# Patient Record
Sex: FEMALE | Race: WHITE | NOT HISPANIC OR LATINO | Employment: OTHER | URBAN - METROPOLITAN AREA
[De-identification: names, ages, dates, MRNs, and addresses within clinical notes are randomized per-mention and may not be internally consistent; named-entity substitution may affect disease eponyms.]

---

## 2017-01-02 ENCOUNTER — APPOINTMENT (OUTPATIENT)
Dept: GENERAL RADIOLOGY | Facility: HOSPITAL | Age: 82
End: 2017-01-02

## 2017-01-02 ENCOUNTER — APPOINTMENT (OUTPATIENT)
Dept: CARDIOLOGY | Facility: HOSPITAL | Age: 82
End: 2017-01-02
Attending: EMERGENCY MEDICINE

## 2017-01-02 ENCOUNTER — HOSPITAL ENCOUNTER (OUTPATIENT)
Facility: HOSPITAL | Age: 82
Setting detail: OBSERVATION
LOS: 1 days | Discharge: HOME | End: 2017-01-03
Attending: EMERGENCY MEDICINE | Admitting: HOSPITALIST

## 2017-01-02 DIAGNOSIS — J44.1 COPD EXACERBATION (HCC): ICD-10-CM

## 2017-01-02 DIAGNOSIS — M25.562 ACUTE PAIN OF LEFT KNEE: ICD-10-CM

## 2017-01-02 DIAGNOSIS — M25.462 KNEE EFFUSION, LEFT: Primary | ICD-10-CM

## 2017-01-02 LAB
ALBUMIN SERPL-MCNC: 3 G/DL (ref 3.5–5.2)
ALBUMIN/GLOB SERPL: 0.8 G/DL
ALP SERPL-CCNC: 68 U/L (ref 39–117)
ALT SERPL W P-5'-P-CCNC: 27 U/L (ref 1–33)
ANION GAP SERPL CALCULATED.3IONS-SCNC: 14 MMOL/L
APPEARANCE FLD: ABNORMAL
APTT PPP: 32.8 SECONDS (ref 22.7–35.4)
AST SERPL-CCNC: 22 U/L (ref 1–32)
BASOPHILS # BLD AUTO: 0.03 10*3/MM3 (ref 0–0.2)
BASOPHILS NFR BLD AUTO: 0.2 % (ref 0–1.5)
BILIRUB SERPL-MCNC: 0.7 MG/DL (ref 0.1–1.2)
BUN BLD-MCNC: 12 MG/DL (ref 8–23)
BUN/CREAT SERPL: 28.6 (ref 7–25)
CALCIUM SPEC-SCNC: 9.2 MG/DL (ref 8.6–10.5)
CHLORIDE SERPL-SCNC: 94 MMOL/L (ref 98–107)
CO2 SERPL-SCNC: 27 MMOL/L (ref 22–29)
COLOR FLD: YELLOW
CREAT BLD-MCNC: 0.42 MG/DL (ref 0.57–1)
CRP SERPL-MCNC: 24.96 MG/DL (ref 0–0.5)
CRYSTALS FLD MICRO: NORMAL
D DIMER PPP FEU-MCNC: 1.18 MCGFEU/ML (ref 0–0.49)
D-LACTATE SERPL-SCNC: 1 MMOL/L (ref 0.5–2)
DEPRECATED RDW RBC AUTO: 55.4 FL (ref 37–54)
EOSINOPHIL # BLD AUTO: 0 10*3/MM3 (ref 0–0.7)
EOSINOPHIL NFR BLD AUTO: 0 % (ref 0.3–6.2)
ERYTHROCYTE [DISTWIDTH] IN BLOOD BY AUTOMATED COUNT: 17.5 % (ref 11.7–13)
FLUAV AG NPH QL: NEGATIVE
FLUBV AG NPH QL IA: NEGATIVE
GFR SERPL CREATININE-BSD FRML MDRD: 145 ML/MIN/1.73
GLOBULIN UR ELPH-MCNC: 3.9 GM/DL
GLUCOSE BLD-MCNC: 153 MG/DL (ref 65–99)
HCT VFR BLD AUTO: 37.4 % (ref 35.6–45.5)
HGB BLD-MCNC: 12.1 G/DL (ref 11.9–15.5)
HOLD SPECIMEN: NORMAL
HOLD SPECIMEN: NORMAL
IMM GRANULOCYTES # BLD: 0.29 10*3/MM3 (ref 0–0.03)
IMM GRANULOCYTES NFR BLD: 2 % (ref 0–0.5)
INR PPP: 1.04 (ref 0.9–1.1)
LYMPHOCYTES # BLD AUTO: 1.14 10*3/MM3 (ref 0.9–4.8)
LYMPHOCYTES NFR BLD AUTO: 7.8 % (ref 19.6–45.3)
MCH RBC QN AUTO: 28.2 PG (ref 26.9–32)
MCHC RBC AUTO-ENTMCNC: 32.4 G/DL (ref 32.4–36.3)
MCV RBC AUTO: 87.2 FL (ref 80.5–98.2)
METHOD: ABNORMAL
MONOCYTES # BLD AUTO: 1.5 10*3/MM3 (ref 0.2–1.2)
MONOCYTES NFR BLD AUTO: 10.3 % (ref 5–12)
MONOCYTES NFR FLD: 7 %
NEUTROPHILS # BLD AUTO: 11.63 10*3/MM3 (ref 1.9–8.1)
NEUTROPHILS NFR BLD AUTO: 79.7 % (ref 42.7–76)
NEUTROPHILS NFR FLD MANUAL: 93 %
NT-PROBNP SERPL-MCNC: 493.3 PG/ML (ref 0–1800)
NUC CELL # FLD: ABNORMAL /MM3
PLATELET # BLD AUTO: 302 10*3/MM3 (ref 140–500)
PMV BLD AUTO: 10.3 FL (ref 6–12)
POTASSIUM BLD-SCNC: 4.1 MMOL/L (ref 3.5–5.2)
PROCALCITONIN SERPL-MCNC: 0.17 NG/ML (ref 0.1–0.25)
PROT SERPL-MCNC: 6.9 G/DL (ref 6–8.5)
PROTHROMBIN TIME: 13.2 SECONDS (ref 11.7–14.2)
RBC # BLD AUTO: 4.29 10*6/MM3 (ref 3.9–5.2)
RBC # FLD AUTO: 220 /MM3
SODIUM BLD-SCNC: 135 MMOL/L (ref 136–145)
TROPONIN T SERPL-MCNC: <0.01 NG/ML (ref 0–0.03)
WBC NRBC COR # BLD: 14.59 10*3/MM3 (ref 4.5–10.7)
WHOLE BLOOD HOLD SPECIMEN: NORMAL
WHOLE BLOOD HOLD SPECIMEN: NORMAL

## 2017-01-02 PROCEDURE — 93971 EXTREMITY STUDY: CPT

## 2017-01-02 PROCEDURE — 93010 ELECTROCARDIOGRAM REPORT: CPT | Performed by: INTERNAL MEDICINE

## 2017-01-02 PROCEDURE — 87205 SMEAR GRAM STAIN: CPT | Performed by: EMERGENCY MEDICINE

## 2017-01-02 PROCEDURE — 94640 AIRWAY INHALATION TREATMENT: CPT

## 2017-01-02 PROCEDURE — 83880 ASSAY OF NATRIURETIC PEPTIDE: CPT | Performed by: EMERGENCY MEDICINE

## 2017-01-02 PROCEDURE — 87070 CULTURE OTHR SPECIMN AEROBIC: CPT | Performed by: EMERGENCY MEDICINE

## 2017-01-02 PROCEDURE — 89060 EXAM SYNOVIAL FLUID CRYSTALS: CPT | Performed by: EMERGENCY MEDICINE

## 2017-01-02 PROCEDURE — 85379 FIBRIN DEGRADATION QUANT: CPT | Performed by: EMERGENCY MEDICINE

## 2017-01-02 PROCEDURE — 80053 COMPREHEN METABOLIC PANEL: CPT | Performed by: EMERGENCY MEDICINE

## 2017-01-02 PROCEDURE — 89051 BODY FLUID CELL COUNT: CPT | Performed by: EMERGENCY MEDICINE

## 2017-01-02 PROCEDURE — 99285 EMERGENCY DEPT VISIT HI MDM: CPT

## 2017-01-02 PROCEDURE — 85730 THROMBOPLASTIN TIME PARTIAL: CPT | Performed by: NURSE PRACTITIONER

## 2017-01-02 PROCEDURE — 85610 PROTHROMBIN TIME: CPT | Performed by: EMERGENCY MEDICINE

## 2017-01-02 PROCEDURE — 84145 PROCALCITONIN (PCT): CPT | Performed by: EMERGENCY MEDICINE

## 2017-01-02 PROCEDURE — 85025 COMPLETE CBC W/AUTO DIFF WBC: CPT | Performed by: EMERGENCY MEDICINE

## 2017-01-02 PROCEDURE — 87040 BLOOD CULTURE FOR BACTERIA: CPT | Performed by: EMERGENCY MEDICINE

## 2017-01-02 PROCEDURE — 86140 C-REACTIVE PROTEIN: CPT | Performed by: EMERGENCY MEDICINE

## 2017-01-02 PROCEDURE — 93005 ELECTROCARDIOGRAM TRACING: CPT | Performed by: NURSE PRACTITIONER

## 2017-01-02 PROCEDURE — 83605 ASSAY OF LACTIC ACID: CPT | Performed by: EMERGENCY MEDICINE

## 2017-01-02 PROCEDURE — 87015 SPECIMEN INFECT AGNT CONCNTJ: CPT | Performed by: EMERGENCY MEDICINE

## 2017-01-02 PROCEDURE — 87804 INFLUENZA ASSAY W/OPTIC: CPT | Performed by: EMERGENCY MEDICINE

## 2017-01-02 PROCEDURE — 73560 X-RAY EXAM OF KNEE 1 OR 2: CPT

## 2017-01-02 PROCEDURE — 96374 THER/PROPH/DIAG INJ IV PUSH: CPT

## 2017-01-02 PROCEDURE — 84484 ASSAY OF TROPONIN QUANT: CPT | Performed by: EMERGENCY MEDICINE

## 2017-01-02 PROCEDURE — 71020 HC CHEST PA AND LATERAL: CPT

## 2017-01-02 RX ORDER — ALBUTEROL SULFATE 2.5 MG/3ML
2.5 SOLUTION RESPIRATORY (INHALATION) ONCE
Status: COMPLETED | OUTPATIENT
Start: 2017-01-02 | End: 2017-01-02

## 2017-01-02 RX ORDER — SODIUM CHLORIDE 0.9 % (FLUSH) 0.9 %
10 SYRINGE (ML) INJECTION AS NEEDED
Status: DISCONTINUED | OUTPATIENT
Start: 2017-01-02 | End: 2017-01-03 | Stop reason: HOSPADM

## 2017-01-02 RX ORDER — IPRATROPIUM BROMIDE AND ALBUTEROL SULFATE 2.5; .5 MG/3ML; MG/3ML
3 SOLUTION RESPIRATORY (INHALATION)
Status: DISCONTINUED | OUTPATIENT
Start: 2017-01-02 | End: 2017-01-03 | Stop reason: HOSPADM

## 2017-01-02 RX ORDER — GLUCOSAMINE SULF/CHONDROITIN A 500-250 MG
3 CAPSULE ORAL 2 TIMES DAILY
COMMUNITY
End: 2022-01-01 | Stop reason: ALTCHOICE

## 2017-01-02 RX ORDER — METHYLPREDNISOLONE SODIUM SUCCINATE 125 MG/2ML
125 INJECTION, POWDER, LYOPHILIZED, FOR SOLUTION INTRAMUSCULAR; INTRAVENOUS ONCE
Status: DISCONTINUED | OUTPATIENT
Start: 2017-01-02 | End: 2017-01-02

## 2017-01-02 RX ORDER — LIDOCAINE HYDROCHLORIDE AND EPINEPHRINE 10; 10 MG/ML; UG/ML
20 INJECTION, SOLUTION INFILTRATION; PERINEURAL ONCE
Status: DISCONTINUED | OUTPATIENT
Start: 2017-01-02 | End: 2017-01-03 | Stop reason: HOSPADM

## 2017-01-02 RX ADMIN — ALBUTEROL SULFATE 2.5 MG: 2.5 SOLUTION RESPIRATORY (INHALATION) at 17:31

## 2017-01-02 NOTE — IP AVS SNAPSHOT
AFTER VISIT SUMMARY             Gely Carlisle           About your hospitalization     You were admitted on:  January 2, 2017 You last received care in the:  [address]       Procedures & Surgeries         Medications    If you or your caregiver advised us that you are currently taking a medication and that medication is marked below as “Resume”, this simply indicates that we have reviewed those medications to make sure our new therapy recommendations do not interfere.  If you have concerns about medications other than those new ones which we are prescribing today, please consult the physician who prescribed them (or your primary physician).  Our review of your home medications is not meant to indicate that we are directing their use.             Your Medications      START taking these medications     indomethacin 50 MG capsule   1 tablet by mouth 3 times a day 3 days then decrease to twice a day for a total of 7 days   Commonly known as:  INDOCIN             CHANGE how you take these medications     methylPREDNISolone 4 MG tablet   Double the usual 4 mg of methylprednisolone to 8 mg for 3 days and then return to her usual dose   Commonly known as:  MEDROL   What changed:    - how much to take  - how to take this  - when to take this  - additional instructions             CONTINUE taking these medications     albuterol (2.5 MG/3ML) 0.083% nebulizer solution   Take 2.5 mg by nebulization every 4 (four) hours as needed for wheezing.   Last time this was given:  1/2/2017  5:31 PM   Commonly known as:  PROVENTIL           calcium carbonate 600 MG tablet   Take 600 mg by mouth 2 (two) times a day with meals.   Commonly known as:  OS-RADHA           cetirizine 10 MG tablet   Take 10 mg by mouth Daily.   Commonly known as:  zyrTEC           Cranberry 1000 MG capsule   Take  by mouth.           fluticasone-salmeterol 100-50 MCG/DOSE DISKUS   Inhale 1 puff 2 (two) times a day. 250/50   Commonly  known as:  ADVAIR           GLUCOSAMINE CHONDROITIN COMPLX 500-250 MG capsule   Take 3 capsules by mouth 2 (Two) Times a Day.   Generic drug:  Glucosamine-Chondroitin           guaiFENesin 600 MG 12 hr tablet   Take 1,200 mg by mouth 2 (two) times a day.   Commonly known as:  MUCINEX           HYDROcodone-acetaminophen 5-325 MG per tablet   TK 1 T PO  Q 6 H PRN P   Commonly known as:  NORCO           levothyroxine 75 MCG tablet   Take 75 mcg by mouth daily.   Commonly known as:  SYNTHROID, LEVOTHROID           montelukast 10 MG tablet   Take 10 mg by mouth every night.   Commonly known as:  SINGULAIR           MULTIVITAMIN ADULT PO   Take  by mouth Daily.           risedronate 35 MG tablet   Take 35 mg by mouth Every 7 (Seven) Days. with water on empty stomach, nothing by mouth or lie down for next 30 minutes. Takes on Monday.   Commonly known as:  ACTONEL           rosuvastatin 10 MG tablet   Take 20 mg by mouth Daily.   Commonly known as:  CRESTOR           saccharomyces boulardii 250 MG capsule   Take 1 capsule by mouth 2 (two) times a day.   Commonly known as:  FLORASTOR           sodium chloride 7 % nebulizer solution nebulizer solution   USE 1 VIAL PER NEBULIZER TWICE A DAY           tiotropium 18 MCG per inhalation capsule   Place 1 capsule into inhaler and inhale 1 (one) time daily. Patient takes the handi mist   Commonly known as:  SPIRIVA           vitamin b complex capsule capsule   Take 1 capsule by mouth Daily.           vitamin C 250 MG tablet   Take 500 mg by mouth daily.   Commonly known as:  ASCORBIC ACID           VSL#3 DS PO   Take 1 capsule by mouth Daily with lunch and dinner.             STOP taking these medications     doxycycline 100 MG capsule   Commonly known as:  MONODOX           Unable to find                Where to Get Your Medications      These medications were sent to [facility] [address] [place] [address] [address] [facility] ALEXANDRA MENDIOLA AT [facility] [phone]  PH - [phone] FX  [address] [place] , [facility] [address]    Hours:  24-hours Phone:  [phone]     indomethacin 50 MG capsule         Information about where to get these medications is not yet available     ! Ask your nurse or doctor about these medications     methylPREDNISolone 4 MG tablet                  Your Medications      Your Medication List           Morning Noon Evening Bedtime As Needed    albuterol (2.5 MG/3ML) 0.083% nebulizer solution   Take 2.5 mg by nebulization every 4 (four) hours as needed for wheezing.   Commonly known as:  PROVENTIL                                   calcium carbonate 600 MG tablet   Take 600 mg by mouth 2 (two) times a day with meals.   Commonly known as:  OS-RADHA                                      cetirizine 10 MG tablet   Take 10 mg by mouth Daily.   Commonly known as:  zyrTEC                                   Cranberry 1000 MG capsule   Take  by mouth.                                   fluticasone-salmeterol 100-50 MCG/DOSE DISKUS   Inhale 1 puff 2 (two) times a day. 250/50   Commonly known as:  ADVAIR                                      GLUCOSAMINE CHONDROITIN COMPLX 500-250 MG capsule   Take 3 capsules by mouth 2 (Two) Times a Day.   Generic drug:  Glucosamine-Chondroitin                                      guaiFENesin 600 MG 12 hr tablet   Take 1,200 mg by mouth 2 (two) times a day.   Commonly known as:  MUCINEX                                      HYDROcodone-acetaminophen 5-325 MG per tablet   TK 1 T PO  Q 6 H PRN P   Commonly known as:  NORCO                                   indomethacin 50 MG capsule   1 tablet by mouth 3 times a day 3 days then decrease to twice a day for a total of 7 days   Commonly known as:  INDOCIN                                levothyroxine 75 MCG tablet   Take 75 mcg by mouth daily.   Commonly known as:  SYNTHROID, LEVOTHROID                                   methylPREDNISolone 4 MG tablet   Double the usual 4 mg of  methylprednisolone to 8 mg for 3 days and then return to her usual dose   Commonly known as:  MEDROL                                montelukast 10 MG tablet   Take 10 mg by mouth every night.   Commonly known as:  SINGULAIR                                   MULTIVITAMIN ADULT PO   Take  by mouth Daily.                                   risedronate 35 MG tablet   Take 35 mg by mouth Every 7 (Seven) Days. with water on empty stomach, nothing by mouth or lie down for next 30 minutes. Takes on Monday.   Commonly known as:  ACTONEL                                rosuvastatin 10 MG tablet   Take 20 mg by mouth Daily.   Commonly known as:  CRESTOR                                   saccharomyces boulardii 250 MG capsule   Take 1 capsule by mouth 2 (two) times a day.   Commonly known as:  FLORASTOR                                      sodium chloride 7 % nebulizer solution nebulizer solution   USE 1 VIAL PER NEBULIZER TWICE A DAY                                      tiotropium 18 MCG per inhalation capsule   Place 1 capsule into inhaler and inhale 1 (one) time daily. Patient takes the handi mist   Commonly known as:  SPIRIVA                                   vitamin b complex capsule capsule   Take 1 capsule by mouth Daily.                                   vitamin C 250 MG tablet   Take 500 mg by mouth daily.   Commonly known as:  ASCORBIC ACID                                   VSL#3 DS PO   Take 1 capsule by mouth Daily with lunch and dinner.                                               Instructions for After Discharge        Activity Instructions     You may resume activity as you feel able.            Diet Instructions     You may resume a regular diet as previously tolerated            Discharge References/Attachments     KNEE PAIN, EASY-TO-READ (ENGLISH)    CHRONIC OBSTRUCTIVE PULMONARY DISEASE, EASY-TO-READ (ENGLISH)    CRYOTHERAPY, EASY-TO-READ (ENGLISH)       Follow-ups for After Discharge        Follow-up  Information     Follow up with Basilio Burleson Jr, MD. Schedule an appointment as soon as possible for a visit in 4 week(s).    Specialty:  Orthopedic Surgery    Contact information:    [address]  [facility] [id number] [phone]          Follow up with Arnaud Paredes MD .    Specialty:  Family Medicine    Contact information:    [address]  [facility] [address]  [phone]        MyChart Signup     Our records indicate that you have declined [employer] MyChart signup. If you would like to sign up for [facility], please email [email or web address] or call [phone] to obtain an activation code.         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, Osteoporosis, Chronic Airway Obstruction, Chronic Adrenal Insufficiency, Elevated White Blood Cell Count      Care Providers     Provider Service Role Specialty    Maryellen Fofana MD Internal Medicine Attending Provider Internal Medicine    Basilio Burleson Jr., MD -- Consulting Physician  Orthopedic Surgery    Maryellen Fofana MD Internal Medicine Consulting Physician  Internal Medicine      Your Allergies  Date Reviewed: 1/3/2017    Allergen Reactions    Demerol (Meperidine) Shortness Of Breath         Aminophylline Arrhythmia         Avelox (Moxifloxacin Hcl In Nacl) Not Noted         Benadryl Allergy (Diphenhydramine Hcl (Sleep)) Not Noted         Biaxin (Clarithromycin) Not Noted         Codeine Not Noted         Levaquin (Levofloxacin In D5w) Arrhythmia         Macrobid (Nitrofurantoin Monohyd Macro) Not Noted         Morphine And Related Not Noted         Penicillins Not Noted         Sulfa Antibiotics Hives         Latex Rash      Pending Labs     Order Current Status    Respiratory Culture Collected (01/03/17 0140)    Blood Culture Preliminary  result    Blood Culture Preliminary result    Body Fluid Culture Preliminary result      Patient Belongings Returned     Document Return of Belongings Flowsheet     Were the patient bedside belongings sent home?   Yes   Belongings Retrieved from Security & Sent Home   N/A    Belongings Sent to Safe   --   Medications Retrieved from Pharmacy & Sent Home   N/A              More Information      Knee Pain  Knee pain is a common problem. It can have many causes. The pain often goes away by following your doctor's home care instructions. Treatment for ongoing pain will depend on the cause of your pain. If your knee pain continues, more tests may be needed to diagnose your condition. Tests may include X-rays or other imaging studies of your knee.  HOME CARE  · Take medicines only as told by your doctor.  · Rest your knee and keep it raised (elevated) while you are resting.  · Do not do things that cause pain or make your pain worse.  · Avoid activities where both feet leave the ground at the same time, such as running, jumping rope, or doing jumping jacks.  · Apply ice to the knee area:    Put ice in a plastic bag.    Place a towel between your skin and the bag.    Leave the ice on for 20 minutes, 2-3 times a day.  · Ask your doctor if you should wear an elastic knee support.  · Sleep with a pillow under your knee.  · Lose weight if you are overweight. Being overweight can make your knee hurt more.  · Do not use any tobacco products, including cigarettes, chewing tobacco, or electronic cigarettes. If you need help quitting, ask your doctor. Smoking may slow the healing of any bone and joint problems that you may have.  GET HELP IF:  · Your knee pain does not stop, it changes, or it gets worse.  · You have a fever along with knee pain.  · Your knee gives out or locks up.  · Your knee becomes more swollen.  GET HELP RIGHT AWAY IF:   · Your knee feels hot to the touch.  · You have chest pain or trouble breathing.     This  information is not intended to replace advice given to you by your health care provider. Make sure you discuss any questions you have with your health care provider.     Document Released: 03/16/2010 Document Revised: 01/08/2016 Document Reviewed: 02/18/2015  [employer] Interactive Patient Education ©2016 [employer]          Chronic Obstructive Pulmonary Disease  Chronic obstructive pulmonary disease (COPD) is a common lung problem. In COPD, the flow of air from the lungs is limited. The way your lungs work will probably never return to normal, but there are things you can do to improve your lungs and make yourself feel better. Your doctor may treat your condition with:  · Medicines.  · Oxygen.  · Lung surgery.  · Changes to your diet.  · Rehabilitation. This may involve a team of specialists.  HOME CARE  · Take all medicines as told by your doctor.  · Avoid medicines or cough syrups that dry up your airway (such as antihistamines) and do not allow you to get rid of thick spit. You do not need to avoid them if told differently by your doctor.  · If you smoke, stop. Smoking makes the problem worse.  · Avoid being around things that make your breathing worse (like smoke, chemicals, and fumes).  · Use oxygen therapy and therapy to help improve your lungs (pulmonary rehabilitation) if told by your doctor. If you need home oxygen therapy, ask your doctor if you should buy a tool to measure your oxygen level (oximeter).  · Avoid people who have a sickness you can catch (contagious).  · Avoid going outside when it is very hot, cold, or humid.  · Eat healthy foods. Eat smaller meals more often. Rest before meals.  · Stay active, but remember to also rest.  · Make sure to get all the shots (vaccines) your doctor recommends. Ask your doctor if you need a pneumonia shot.  · Learn and use tips on how to relax.  · Learn and use tips on how to control your breathing as told by your doctor. Try:    Breathing in (inhaling) through  your nose for 1 second. Then, pucker your lips and breath out (exhale) through your lips for 2 seconds.    Putting one hand on your belly (abdomen). Breathe in slowly through your nose for 1 second. Your hand on your belly should move out. Pucker your lips and breathe out slowly through your lips. Your hand on your belly should move in as you breathe out.  · Learn and use controlled coughing to clear thick spit from your lungs. The steps are:  . Lean your head a little forward.  . Breathe in deeply.  . Try to hold your breath for 3 seconds.  . Keep your mouth slightly open while coughing 2 times.  . Spit any thick spit out into a tissue.  . Rest and do the steps again 1 or 2 times as needed.  GET HELP IF:  · You cough up more thick spit than usual.  · There is a change in the color or thickness of the spit.  · It is harder to breathe than usual.  · Your breathing is faster than usual.  GET HELP RIGHT AWAY IF:  · You have shortness of breath while resting.  · You have shortness of breath that stops you from:    Being able to talk.    Doing normal activities.  · You chest hurts for longer than 5 minutes.  · Your skin color is more blue than usual.  · Your pulse oximeter shows that you have low oxygen for longer than 5 minutes.  MAKE SURE YOU:  · Understand these instructions.  · Will watch your condition.  · Will get help right away if you are not doing well or get worse.     This information is not intended to replace advice given to you by your health care provider. Make sure you discuss any questions you have with your health care provider.     Document Released: 06/05/2009 Document Revised: 01/08/2016 Document Reviewed: 08/14/2014  [employer] Interactive Patient Education ©2016 [employer]          Cryotherapy  Cryotherapy is when you put ice on your injury. Ice helps lessen pain and puffiness (swelling) after an injury. Ice works the best when you start using it in the first 24 to 48 hours after an injury.  HOME  CARE  · Put a dry or damp towel between the ice pack and your skin.  · You may press gently on the ice pack.  · Leave the ice on for no more than 10 to 20 minutes at a time.  · Check your skin after 5 minutes to make sure your skin is okay.  · Rest at least 20 minutes between ice pack uses.  · Stop using ice when your skin loses feeling (numbness).  · Do not use ice on someone who cannot tell you when it hurts. This includes small children and people with memory problems (dementia).  GET HELP RIGHT AWAY IF:  · You have white spots on your skin.  · Your skin turns blue or pale.  · Your skin feels waxy or hard.  · Your puffiness gets worse.  MAKE SURE YOU:   · Understand these instructions.  · Will watch your condition.  · Will get help right away if you are not doing well or get worse.     This information is not intended to replace advice given to you by your health care provider. Make sure you discuss any questions you have with your health care provider.     Document Released: 06/05/2009 Document Revised: 03/11/2013 Document Reviewed: 08/09/2012  [employer] Interactive Patient Education ©2016 [employer]            SYMPTOMS OF A STROKE    Call 911 or have someone take you to the Emergency Department if you have any of the following:    · Sudden numbness or weakness of your face, arm or leg especially on one side of the body  · Sudden confusion, diffiiculty speaking or trouble understanding   · Changes in your vision or loss of sight in one eye  · Sudden severe headache with no known cause  · sudden dizziness, trouble walking, loss of balance or coordination    It is important to seek emergency care right away if you have further stroke symptoms. If you get emergency help quickly, the powerful clot-dissolving medicines can reduce the disabilities caused by a stroke.     For more information:    American Stroke Association  4-342-4-STROKE  www.strokeassociation.org           IF YOU SMOKE OR USE TOBACCO PLEASE READ  THE FOLLOWING:    Why is smoking bad for me?  Smoking increases the risk of heart disease, lung disease, vascular disease, stroke, and cancer.     If you smoke, STOP!    If you would like more information on quitting smoking, please visit the [employer] website: [email or web address]-[email or web address]   This link will provide additional resources including the QUIT line and the Beat the Pack support groups.     For more information:    American Cancer Society  ([phone] 855-8243    American Heart Association  1-191.461.5912               YOU ARE THE MOST IMPORTANT FACTOR IN YOUR RECOVERY.     Follow all instructions carefully.     I have reviewed my discharge instructions with my nurse, including the following information, if applicable:     Information about my illness and diagnosis   Follow up appointments (including lab draws)   Wound Care   Equipment Needs   Medications (new and continuing) along with side effects   Preventative information such as vaccines and smoking cessations   Diet   Pain   I know when to contact my Doctor's office or seek emergency care      I want my nurse to describe the side effects of my medications: YES NO   If the answer is no, I understand the side effects of my medications: YES NO   My nurse described the side effects of my medications in a way that I could understand: YES NO   I have taken my personal belongings and my own medications with me at discharge: YES NO            I have received this information and my questions have been answered. I have discussed any concerns I see with this plan with the nurse or physician. I understand these instructions.    Signature of Patient or Responsible Person: _____________________________________    Date: _________________  Time: __________________    Signature of Healthcare Provider: _______________________________________  Date: _________________  Time: __________________

## 2017-01-02 NOTE — ED NOTES
Left knee pain, irregular heart beat, and shortness of breath. Left knee pain started two days ago after feeling pop upon standing. Other symptoms started today.      Estelita Clarke RN  01/02/17 1714

## 2017-01-02 NOTE — ED PROVIDER NOTES
" EMERGENCY DEPARTMENT ENCOUNTER    CHIEF COMPLAINT  Chief Complaint: SOB, left knee pain  History given by: patient, family  History limited by: nothing  Room Number: P879/1  PMD: Arnaud Paredes MD  Pulmonologist- Dr. Saud Latif and Dr. Hudson  Cardiologist- Dr. Sim Winter- Dr. Duran    HPI:  Pt is a 81 y.o. female who presents complaining of SOB, which began earlier today. Pt complains of left knee pain, which began when she woke up 3 days ago. Pt states that she felt a \"pop\" in her left knee about one week ago but did not have pain until 3 days ago. Pt denies cough, N/V/D, urinary issues or CP but complains of a fever of 100.6 earlier today. Pt's family states that the pt's SOB is worse with exertion and the pt's left leg has been swollen for the last 3 days.     Duration:  One day  Onset: gradual  Timing: constant  Location: N/A  Radiation: N/A  Quality: SOB  Intensity/Severity: moderate  Progression: worsening  Associated Symptoms: left knee pain, fever, LE swelling  Aggravating Factors: exertion  Alleviating Factors: none  Previous Episodes: Pt has a history of SOB due to COPD and asthma.  Treatment before arrival: none    PAST MEDICAL HISTORY  Active Ambulatory Problems     Diagnosis Date Noted   • MI (mitral incompetence) 04/13/2016   • Billowing mitral valve 04/13/2016   • Breath shortness 04/13/2016   • Cellulitis of right lower extremity with possible infected hematoma 07/30/2016   • Enterococcus UTI 07/30/2016   • Hypokalemia 07/30/2016   • Type 2 diabetes mellitus, uncontrolled 07/30/2016   • Bronchiectasis - on chronic steroid therapy 07/30/2016   • Chronic adrenal insufficiency 07/30/2016   • COPD (chronic obstructive pulmonary disease) 07/30/2016   • Lymphedema 07/30/2016   • Osteoporosis 07/30/2016   • Recurrent UTI 08/10/2016     Resolved Ambulatory Problems     Diagnosis Date Noted   • No Resolved Ambulatory Problems     Past Medical History   Diagnosis Date   • Asthma    • Cancer    • Colitis "    • Diabetes mellitus    • Disease of thyroid gland    • History of transfusion    • Hyperlipidemia    • Insufficiency, adrenal    • Leukocytosis    • MR (mitral regurgitation)    • MVP (mitral valve prolapse)    • Osteoarthritis    • Scoliosis    • Urinary tract infection        PAST SURGICAL HISTORY  Past Surgical History   Procedure Laterality Date   • Colon surgery  1983     RUPTURED DIVERTICULI  DYLAN WORLEY MD   • Cataract extraction     • Cholecystectomy       WITH COMMON BILE DUCT EXPLORATION REMOVAL OF STONE   • Hernia repair  2012     ABDOMINAL LEFT UPPER   • Hysterectomy     • Joint replacement Right      hip  2002 DR BROWN   • Lung lobectomy Left 1955     BRONCHIECTESIS LOWER LOBE   • Tonsillectomy       with adennoidectomy       FAMILY HISTORY  Family History   Problem Relation Age of Onset   • Heart disease Mother    • Heart attack Mother    • Cancer Sister      BREAST       SOCIAL HISTORY  Social History     Social History   • Marital status:      Spouse name: N/A   • Number of children: N/A   • Years of education: N/A     Occupational History   • Not on file.     Social History Main Topics   • Smoking status: Never Smoker   • Smokeless tobacco: Not on file   • Alcohol use No   • Drug use: No   • Sexual activity: No     Other Topics Concern   • Not on file     Social History Narrative       ALLERGIES  Demerol [meperidine]; Aminophylline; Avelox [moxifloxacin hcl in nacl]; Benadryl allergy [diphenhydramine hcl (sleep)]; Biaxin [clarithromycin]; Codeine; Levaquin [levofloxacin in d5w]; Macrobid [nitrofurantoin monohyd macro]; Morphine and related; Penicillins; Sulfa antibiotics; and Latex    REVIEW OF SYSTEMS  Review of Systems   Constitutional: Positive for fever (100.6).   HENT: Negative for sore throat.    Eyes: Negative.    Respiratory: Positive for shortness of breath. Negative for cough.    Cardiovascular: Positive for leg swelling (left leg). Negative for chest pain.   Gastrointestinal:  Negative for abdominal pain, diarrhea and vomiting.   Genitourinary: Negative for dysuria.   Musculoskeletal: Positive for arthralgias (L knee) and gait problem (due to left knee pain). Negative for neck pain.   Skin: Negative for rash.   Allergic/Immunologic: Negative.    Neurological: Negative for weakness, numbness and headaches.   Hematological: Negative.    Psychiatric/Behavioral: Negative.    All other systems reviewed and are negative.      PHYSICAL EXAM  ED Triage Vitals   Temp Heart Rate Resp BP SpO2   01/02/17 1656 01/02/17 1656 01/02/17 1656 01/02/17 1656 01/02/17 1656   98.7 °F (37.1 °C) 101 16 139/72 94 %      Temp src Heart Rate Source Patient Position BP Location FiO2 (%)   -- -- -- -- --              Physical Exam   Constitutional: She is oriented to person, place, and time and well-developed, well-nourished, and in no distress. No distress.   HENT:   Head: Normocephalic and atraumatic.   Eyes: EOM are normal. Pupils are equal, round, and reactive to light.   Neck: Normal range of motion. Neck supple. No JVD present.   Cardiovascular: Normal rate, regular rhythm and normal heart sounds.    Pulmonary/Chest: Effort normal. No respiratory distress. She has decreased breath sounds (bilaterally).   Abdominal: Soft. There is no tenderness. There is no rebound and no guarding.   Musculoskeletal: Normal range of motion.        Left knee: She exhibits swelling, effusion and erythema. Tenderness found.        Left ankle: She exhibits swelling.        Left lower leg: She exhibits swelling and edema.        Left foot: There is swelling.   Right hand is contracted   Neurological: She is alert and oriented to person, place, and time. She has normal sensation and normal strength.   Skin: Skin is warm and dry. No rash noted.   Psychiatric: Mood and affect normal.   Nursing note and vitals reviewed.      LAB RESULTS  Lab Results (last 24 hours)     Procedure Component Value Units Date/Time    aPTT [[id number]]   (Normal) Collected:  01/02/17 1759    Specimen:  Blood Updated:  01/02/17 1830     PTT 32.8 seconds     Protime-INR [[id number]]  (Normal) Collected:  01/02/17 1759    Specimen:  Blood Updated:  01/02/17 1830     Protime 13.2 Seconds      INR 1.04     D-dimer, Quantitative [[id number]]  (Abnormal) Collected:  01/02/17 1759    Specimen:  Blood Updated:  01/02/17 1830     D-Dimer, Quantitative 1.18 (H) MCGFEU/mL     Blood Culture [[id number]] Collected:  01/02/17 1759    Specimen:  Blood from Arm, Left Updated:  01/02/17 1811    CBC & Differential [[id number]] Collected:  01/02/17 1800    Specimen:  Blood Updated:  01/02/17 1818    Narrative:       The following orders were created for panel order CBC & Differential.  Procedure                               Abnormality         Status                     ---------                               -----------         ------                     CBC Auto Differential[[id number]]         Abnormal            Final result                 Please view results for these tests on the individual orders.    Comprehensive Metabolic Panel [[id number]]  (Abnormal) Collected:  01/02/17 1800    Specimen:  Blood Updated:  01/02/17 1909     Glucose 153 (H) mg/dL      BUN 12 mg/dL      Creatinine 0.42 (L) mg/dL      Sodium 135 (L) mmol/L      Potassium 4.1 mmol/L      Chloride 94 (L) mmol/L      CO2 27.0 mmol/L      Calcium 9.2 mg/dL      Total Protein 6.9 g/dL      Albumin 3.00 (L) g/dL      ALT (SGPT) 27 U/L      AST (SGOT) 22 U/L      Alkaline Phosphatase 68 U/L      Total Bilirubin 0.7 mg/dL      eGFR Non African Amer 145 mL/min/1.73      Globulin 3.9 gm/dL      A/G Ratio 0.8 g/dL      BUN/Creatinine Ratio 28.6 (H)      Anion Gap 14.0 mmol/L     Narrative:       The MDRD GFR formula is only valid for adults with stable renal function between ages 18 and 70.    BNP [[id number]]  (Normal) Collected:  01/02/17 1800    Specimen:  Blood Updated:  01/02/17 1856     proBNP 493.3 pg/mL     Narrative:        "Among patients with dyspnea, NT-proBNP is highly sensitive for the detection of acute congestive heart failure. In addition NT-proBNP of <300 pg/ml effectively rules out acute congestive heart failure with 99% negative predictive value.    Troponin [[id number]]  (Normal) Collected:  01/02/17 1800    Specimen:  Blood Updated:  01/02/17 1909     Troponin T <0.010 ng/mL     Narrative:       Troponin T Reference Ranges:  Less than 0.03 ng/mL:    Negative for AMI  0.03 to 0.09 ng/mL:      Indeterminant for AMI  Greater than 0.09 ng/mL: Positive for AMI    Procalcitonin [[id number]]  (Normal) Collected:  01/02/17 1800    Specimen:  Blood Updated:  01/02/17 1856     Procalcitonin 0.17 ng/mL     Narrative:       As a Marker for Sepsis (Non-Neonates):   1. <0.5 ng/mL represents a low risk of severe sepsis and/or septic shock.  1. >2 ng/mL represents a high risk of severe sepsis and/or septic shock.    As a Marker for Lower Respiratory Tract Infections that require antibiotic therapy:  PCT on Admission     Antibiotic Therapy             6-12 Hrs later  > 0.5                Strongly Recommended            >0.25 - <0.5         Recommended  0.1 - 0.25           Discouraged                   Remeasure/reassess PCT  <0.1                 Strongly Discouraged          Remeasure/reassess PCT      As 28 day mortality risk marker: \"Change in Procalcitonin Result\" (> 80 % or <=80 %) if Day 0 (or Day 1) and Day 4 values are available. Refer to http://www.[facility]-pct-calculator.com/   Change in PCT <=80 %   A decrease of PCT levels below or equal to 80 % defines a positive change in PCT test result representing a higher risk for 28-day all-cause mortality of patients diagnosed with severe sepsis or septic shock.  Change in PCT > 80 %   A decrease of PCT levels of more than 80 % defines a negative change in PCT result representing a lower risk for 28-day all-cause mortality of patients diagnosed with severe sepsis or septic shock.             "    Lactic Acid, Plasma [[id number]]  (Normal) Collected:  01/02/17 1800    Specimen:  Blood Updated:  01/02/17 1827     Lactate 1.0 mmol/L     C-reactive Protein [[id number]]  (Abnormal) Collected:  01/02/17 1800    Specimen:  Blood Updated:  01/02/17 1909     C-Reactive Protein 24.96 (H) mg/dL     CBC Auto Differential [[id number]]  (Abnormal) Collected:  01/02/17 1800    Specimen:  Blood Updated:  01/02/17 1818     WBC 14.59 (H) 10*3/mm3      RBC 4.29 10*6/mm3      Hemoglobin 12.1 g/dL      Hematocrit 37.4 %      MCV 87.2 fL      MCH 28.2 pg      MCHC 32.4 g/dL      RDW 17.5 (H) %      RDW-SD 55.4 (H) fl      MPV 10.3 fL      Platelets 302 10*3/mm3      Neutrophil % 79.7 (H) %      Lymphocyte % 7.8 (L) %      Monocyte % 10.3 %      Eosinophil % 0.0 (L) %      Basophil % 0.2 %      Immature Grans % 2.0 (H) %      Neutrophils, Absolute 11.63 (H) 10*3/mm3      Lymphocytes, Absolute 1.14 10*3/mm3      Monocytes, Absolute 1.50 (H) 10*3/mm3      Eosinophils, Absolute 0.00 10*3/mm3      Basophils, Absolute 0.03 10*3/mm3      Immature Grans, Absolute 0.29 (H) 10*3/mm3     Influenza Antigen [[id number]]  (Normal) Collected:  01/02/17 1803    Specimen:  Swab from Nasopharynx Updated:  01/02/17 1835     Influenza A Ag, EIA Negative      Influenza B Ag, EIA Negative     Crystal Exam, Fluid [[id number]] Collected:  01/02/17 2103    Specimen:  Synovial Fluid Updated:  01/02/17 2213     Crystals, Fluid        Intracellular crystals observed exhibiting polarization characteristics of Calcium Pyrophosphate    Body Fluid Culture [[id number]] Collected:  01/02/17 2103    Specimen:  Body Fluid from Knee, Left Updated:  01/02/17 2126    Body Fluid Cell Count With Differential [[id number]] Collected:  01/02/17 2103    Specimen:  Body Fluid Updated:  01/02/17 2212    Narrative:       The following orders were created for panel order Body Fluid Cell Count With Differential.  Procedure                               Abnormality         Status                      ---------                               -----------         ------                     Body fluid cell count[[id number]]         Abnormal            Final result               Body fluid differential[[id number]]                           Final result                 Please view results for these tests on the individual orders.    Body fluid cell count [[id number]]  (Abnormal) Collected:  01/02/17 2103    Specimen:  Body Fluid Updated:  01/02/17 2212     Color, Fluid Yellow      Appearance, Fluid Cloudy (A)      RBC, Fluid 220 /mm3      Nucleated Cells, Fluid 52317 /mm3      Method: Hemacytometer Method     Narrative:       Estimated count due to clots present in specimen.    Body fluid differential [[id number]] Collected:  01/02/17 2103    Specimen:  Body Fluid Updated:  01/02/17 2212     Neutrophils, Fluid 93 %      Monocytes, Fluid 7 %     Blood Culture [[id number]] Collected:  01/02/17 2110    Specimen:  Blood from Arm, Left Updated:  01/02/17 2228          I ordered the above labs and reviewed the results    RADIOLOGY  XR Chest 2 View    (Results Pending)   XR Knee 1 or 2 View Left    (Results Pending)      1939- Reviewed pt's CXR, which shows nothing acute. Pt's L knee XR shows a moderate joint effusion and degenerative changes but no fracture. Independently viewed by me. Interpreted by radiologist.     1954- Reviewed pt's LLE doppler, which is negative for DVT. Independently viewed by me. Interpreted by radiologist.    I ordered the above noted radiological studies. Interpreted by radiologist.  Reviewed by me in PACS.       PROCEDURES  Procedures  EKG           EKG time: 1723  Rhythm/Rate: sinus tachycardia, 100  P waves and [place]: normal  QRS, axis: LAD, PRWP   ST and T waves: non-specific ST and T wave changes    Interpreted Contemporaneously by me, independently viewed  unchanged compared to prior 1-23-12    PROGRESS AND CONSULTS  ED Course     1720-Ordered blood work, lactic acid, blood cultures, respiratory  culture, procalcitonin, D-Dimer, PT with INR, Troponin, BNP, CRP, influenza screen, L knee XR and CXR for further evaluation.     1722- Ordered albuterol breathing treatment for SOB.    1738- Ordered solumedrol for SOB.    1808- Per RN, the pt is refusing to take solu-medrol until I discuss the pt's case with Dr. Latif (pulmonology).    6:09 PM  Latest vital signs   BP- 139/72 HR- 97 Temp- 98.7 °F (37.1 °C) O2 sat- 97%    1831- Ordered LLE doppler for further evaluation.     1834- Pt is in XR at this time. Notified pt's family of the pt's elevated D-Dimer and the plan to order a LLE doppler to r/o DVT due to pt's new leg swelling. Pt's family agrees with the plan and all questions were addressed.    1957- Pt is in doppler at this time. Notified pt's family of the pt's negative doppler and the joint effusion seen on XR. Discussed the plan to aspirate the pt's knee. Pt's family agrees with the plan and all questions were addressed.    1958- Discussed the pt's case with SIDDHARTHA Lind, who agrees to aspirate the pt's knee.    2003- Discussed the pt's case with Dr. Laureano (pulmonology) who agrees to consult. He states that it is okay to hold the solu-medrol at this time.    2031- Placed call to [facility] for admission.    8:33 PM  Latest vital signs   BP- 139/72 HR- 100 Temp- 98.7 °F (37.1 °C) O2 sat- 97%    2040- Rechecked pt. Pt is resting comfortably. Notified pt and family of the pt's lab and imaging results, including the pt's elevated WBC count. Discussed the consult with Dr. Laureano (pulmonology). Discussed the plan to admit the pt for further evaluation of her symptoms. Pt and family agree with the plan and all questions were addressed.    2059- Discussed the pt's case with Dr. Grossman ([facility]) who agrees to admit the pt to a Med/Surg bed.    MEDICAL DECISION MAKING  Results were reviewed/discussed with the patient and they were also made aware of online access. Pt also made aware that some labs, such as cultures, will not be  resulted during ER visit and follow up with PMD is necessary.     MDM  Number of Diagnoses or Management Options  Acute pain of left knee:   COPD exacerbation:   Knee effusion, left:      Amount and/or Complexity of Data Reviewed  Clinical lab tests: ordered and reviewed (D-Dimer=1.18)  Tests in the radiology section of CPT®: ordered and reviewed ( L knee XR shows a moderate joint effusion and degenerative changes but no fracture. CXR shows nothing acute)  Tests in the medicine section of CPT®: ordered and reviewed (See EKG procedure note)  Decide to obtain previous medical records or to obtain history from someone other than the patient: yes  Obtain history from someone other than the patient: yes (family)  Discuss the patient with other providers: yes (D/w Dr. Laureano (pulmonology) and Dr. Grossman ([facility]))  Independent visualization of images, tracings, or specimens: yes           DIAGNOSIS  Final diagnoses:   Knee effusion, left   Acute pain of left knee   COPD exacerbation       DISPOSITION  ADMISSION    Discussed treatment plan and reason for admission with pt/family and admitting physician.  Pt/family voiced understanding of the plan for admission for further testing/treatment as needed.       Latest Documented Vital Signs:  As of 11:10 PM  BP- 148/73 HR- 91 Temp- 97.9 °F (36.6 °C) (Oral) O2 sat- 97%    --  Documentation assistance provided by jillian Wasserman for Dr. Sharp.  Information recorded by the scribe was done at my direction and has been verified and validated by me.       Rosita Wasserman  01/02/17 2651       Alonzo Sharp MD  01/02/17 7942

## 2017-01-03 ENCOUNTER — APPOINTMENT (OUTPATIENT)
Dept: GENERAL RADIOLOGY | Facility: HOSPITAL | Age: 82
End: 2017-01-03
Attending: ORTHOPAEDIC SURGERY

## 2017-01-03 VITALS
DIASTOLIC BLOOD PRESSURE: 72 MMHG | HEIGHT: 63 IN | SYSTOLIC BLOOD PRESSURE: 108 MMHG | OXYGEN SATURATION: 91 % | HEART RATE: 92 BPM | RESPIRATION RATE: 16 BRPM | WEIGHT: 110 LBS | TEMPERATURE: 99.3 F | BODY MASS INDEX: 19.49 KG/M2

## 2017-01-03 PROBLEM — M11.279: Status: ACTIVE | Noted: 2017-01-03

## 2017-01-03 PROBLEM — D72.829 LEUKOCYTOSIS: Status: ACTIVE | Noted: 2017-01-03

## 2017-01-03 PROBLEM — M11.262 PSEUDOGOUT OF LEFT KNEE: Status: ACTIVE | Noted: 2017-01-03

## 2017-01-03 LAB
GLUCOSE BLDC GLUCOMTR-MCNC: 135 MG/DL (ref 70–130)
GLUCOSE BLDC GLUCOMTR-MCNC: 86 MG/DL (ref 70–130)

## 2017-01-03 PROCEDURE — 73630 X-RAY EXAM OF FOOT: CPT

## 2017-01-03 PROCEDURE — 94640 AIRWAY INHALATION TREATMENT: CPT

## 2017-01-03 PROCEDURE — 25010000002 KETOROLAC TROMETHAMINE PER 15 MG: Performed by: ORTHOPAEDIC SURGERY

## 2017-01-03 PROCEDURE — 82962 GLUCOSE BLOOD TEST: CPT

## 2017-01-03 PROCEDURE — G0378 HOSPITAL OBSERVATION PER HR: HCPCS

## 2017-01-03 PROCEDURE — 96374 THER/PROPH/DIAG INJ IV PUSH: CPT

## 2017-01-03 RX ORDER — INDOMETHACIN 50 MG/1
CAPSULE ORAL
Qty: 21 CAPSULE | Refills: 0 | Status: SHIPPED | OUTPATIENT
Start: 2017-01-03 | End: 2017-05-26

## 2017-01-03 RX ORDER — INDOMETHACIN 50 MG/1
CAPSULE ORAL
Qty: 14 CAPSULE | Refills: 0 | Status: SHIPPED | OUTPATIENT
Start: 2017-01-03 | End: 2017-01-03

## 2017-01-03 RX ORDER — BUDESONIDE AND FORMOTEROL FUMARATE DIHYDRATE 80; 4.5 UG/1; UG/1
2 AEROSOL RESPIRATORY (INHALATION)
Status: DISCONTINUED | OUTPATIENT
Start: 2017-01-03 | End: 2017-01-03 | Stop reason: HOSPADM

## 2017-01-03 RX ORDER — KETOROLAC TROMETHAMINE 30 MG/ML
15 INJECTION, SOLUTION INTRAMUSCULAR; INTRAVENOUS EVERY 6 HOURS PRN
Status: DISCONTINUED | OUTPATIENT
Start: 2017-01-03 | End: 2017-01-03 | Stop reason: HOSPADM

## 2017-01-03 RX ORDER — LEVOTHYROXINE SODIUM 0.07 MG/1
75 TABLET ORAL EVERY MORNING
Status: DISCONTINUED | OUTPATIENT
Start: 2017-01-03 | End: 2017-01-03 | Stop reason: HOSPADM

## 2017-01-03 RX ORDER — ALBUTEROL SULFATE 2.5 MG/3ML
2.5 SOLUTION RESPIRATORY (INHALATION) EVERY 4 HOURS PRN
Status: DISCONTINUED | OUTPATIENT
Start: 2017-01-03 | End: 2017-01-03 | Stop reason: HOSPADM

## 2017-01-03 RX ORDER — HYDROCODONE BITARTRATE AND ACETAMINOPHEN 5; 325 MG/1; MG/1
1 TABLET ORAL EVERY 6 HOURS PRN
Status: DISCONTINUED | OUTPATIENT
Start: 2017-01-03 | End: 2017-01-03 | Stop reason: HOSPADM

## 2017-01-03 RX ORDER — MONTELUKAST SODIUM 10 MG/1
10 TABLET ORAL NIGHTLY
Status: DISCONTINUED | OUTPATIENT
Start: 2017-01-03 | End: 2017-01-03 | Stop reason: HOSPADM

## 2017-01-03 RX ORDER — METHYLPREDNISOLONE 4 MG/1
TABLET ORAL
Start: 2017-01-03 | End: 2017-07-07

## 2017-01-03 RX ORDER — GUAIFENESIN 600 MG/1
1200 TABLET, EXTENDED RELEASE ORAL 2 TIMES DAILY
Status: DISCONTINUED | OUTPATIENT
Start: 2017-01-03 | End: 2017-01-03 | Stop reason: HOSPADM

## 2017-01-03 RX ORDER — SODIUM CHLORIDE FOR INHALATION 7 %
4 VIAL, NEBULIZER (ML) INHALATION ONCE
Status: DISCONTINUED | OUTPATIENT
Start: 2017-01-03 | End: 2017-01-03 | Stop reason: HOSPADM

## 2017-01-03 RX ADMIN — IPRATROPIUM BROMIDE AND ALBUTEROL SULFATE 3 ML: .5; 3 SOLUTION RESPIRATORY (INHALATION) at 00:21

## 2017-01-03 RX ADMIN — KETOROLAC TROMETHAMINE 15 MG: 30 INJECTION, SOLUTION INTRAMUSCULAR at 09:28

## 2017-01-03 RX ADMIN — IPRATROPIUM BROMIDE AND ALBUTEROL SULFATE 3 ML: .5; 3 SOLUTION RESPIRATORY (INHALATION) at 09:11

## 2017-01-03 RX ADMIN — IPRATROPIUM BROMIDE AND ALBUTEROL SULFATE 3 ML: .5; 3 SOLUTION RESPIRATORY (INHALATION) at 12:54

## 2017-01-03 NOTE — PLAN OF CARE
Problem: Patient Care Overview (Adult)  Goal: Plan of Care Review  Outcome: Ongoing (interventions implemented as appropriate)    01/03/17 1237   Coping/Psychosocial Response Interventions   Plan Of Care Reviewed With patient;daughter   Patient Care Overview   Progress progress toward functional goals as expected   Outcome Evaluation   Outcome Summary/Follow up Plan pt doing well. pain is well controlled. daughter at bedside. possible d/c home today after XRAY.        Goal: Adult Individualization and Mutuality  Outcome: Ongoing (interventions implemented as appropriate)  Goal: Discharge Needs Assessment  Outcome: Ongoing (interventions implemented as appropriate)    Problem: Fall Risk (Adult)  Goal: Identify Related Risk Factors and Signs and Symptoms  Outcome: Ongoing (interventions implemented as appropriate)  Goal: Absence of Falls  Outcome: Ongoing (interventions implemented as appropriate)    Problem: Infection, Risk/Actual (Adult)  Goal: Identify Related Risk Factors and Signs and Symptoms  Outcome: Ongoing (interventions implemented as appropriate)  Goal: Infection Prevention/Resolution  Outcome: Ongoing (interventions implemented as appropriate)

## 2017-01-03 NOTE — H&P
[facility]    Patient Identification:  Name: Gley Carlisle  Age/Sex: 81 y.o. female  :  1935  MRN: [id number]         Primary Care Physician: Arnaud Paredes MD    SAME DAY ADMIT & DISCHARGE    Chief Complaint   Patient presents with   • Shortness of Breath   • Knee Pain         History of Present Illness  Patient is a 81 y.o. female who presents with a three-day history of left knee pain.  There is no previous history of gout or known injury.  In the emergency room last evening she had an x-ray of the knee which showed an effusion.  She had a mild increase in her shortness of breath.  She has bronchiectasis and COPD with chronic steroids for adrenal insufficiency not for this COPD.  She denied any increase in her daily cough fever or chills.  She is unaware of any exposure to sick contacts and wears a mask to prevent further infection because of the immunosuppression.  She was recently treated in November for Pseudomonas in her sputum with a 14 day course of Cipro.  She had some hemoptysis which is since resolved.  She denies any increasing cough or chest pain.  Her chest x-ray last evening did not look any different than in the past.  She was admitted as exacerbation of her COPD bronchiectasis in addition to the knee effusion.    She has a history of adrenal insufficiency as noted.  She had been seen by an allergist and had immunoglobulin testing recently which was normal.  There is no previous history of an evaluation for her chronic leukocytosis.    There is COPD with chronic bronchiectasis she denies any increase in cough or shortness of breath today.  She denies any chest pain fever or chills.  There is chronic sputum production which is unchanged.  After completion of the antibiotics for the Pseudomonas she's felt stable.          Past Medical History   Diagnosis Date   • Asthma    • Bronchiectasis    • Cancer      cervical and skin   •  Colitis    • COPD (chronic obstructive pulmonary disease)    • Diabetes mellitus      hyperglycemia   • Disease of thyroid gland      hypothyroidism   • History of transfusion    • Hyperlipidemia    • Insufficiency, adrenal    • Leukocytosis    • Lymphedema    • MR (mitral regurgitation)    • MVP (mitral valve prolapse)    • Osteoarthritis    • Osteoporosis    • Osteoporosis    • Scoliosis    • Urinary tract infection      Past Surgical History   Procedure Laterality Date   • Colon surgery  1983     RUPTURED DIVERTICULI  DYLAN WORLEY MD   • Cataract extraction     • Cholecystectomy       WITH COMMON BILE DUCT EXPLORATION REMOVAL OF STONE   • Hernia repair  2012     ABDOMINAL LEFT UPPER   • Hysterectomy     • Joint replacement Right      hip  2002 DR BROWN   • Lung lobectomy Left 1955     BRONCHIECTESIS LOWER LOBE   • Tonsillectomy       with adennoidectomy     Family History   Problem Relation Age of Onset   • Heart disease Mother    • Heart attack Mother    • Cancer Sister      BREAST     Social History   Substance Use Topics   • Smoking status: Never Smoker   • Smokeless tobacco: None   • Alcohol use No     Prescriptions Prior to Admission   Medication Sig Dispense Refill Last Dose   • albuterol (PROVENTIL) (2.5 MG/3ML) 0.083% nebulizer solution Take 2.5 mg by nebulization every 4 (four) hours as needed for wheezing.   9/17/2016 at Unknown time   • B Complex Vitamins (VITAMIN B COMPLEX) capsule capsule Take 1 capsule by mouth Daily.   9/17/2016 at Unknown time   • cetirizine (ZyrTEC) 10 MG tablet Take 10 mg by mouth Daily.   9/16/2016 at Unknown time   • Cranberry 1000 MG capsule Take  by mouth.      • fluticasone-salmeterol (ADVAIR) 100-50 MCG/DOSE DISKUS Inhale 1 puff 2 (two) times a day. 250/50     9/17/2016 at Unknown time   • Glucosamine-Chondroitin (GLUCOSAMINE CHONDROITIN COMPLX) 500-250 MG capsule Take 3 capsules by mouth 2 (Two) Times a Day.      • guaiFENesin (MUCINEX) 600 MG 12 hr tablet Take 1,200 mg  by mouth 2 (two) times a day.   9/17/2016 at Unknown time   • levothyroxine (SYNTHROID, LEVOTHROID) 75 MCG tablet Take 75 mcg by mouth daily.   9/17/2016 at Unknown time   • methylPREDNISolone (MEDROL) 4 MG tablet Take 4 mg by mouth Daily.   9/17/2016 at Unknown time   • montelukast (SINGULAIR) 10 MG tablet Take 10 mg by mouth every night.   9/16/2016 at Unknown time   • Multiple Vitamins-Minerals (MULTIVITAMIN ADULT PO) Take  by mouth Daily.      • risedronate (ACTONEL) 35 MG tablet Take 35 mg by mouth Every 7 (Seven) Days. with water on empty stomach, nothing by mouth or lie down for next 30 minutes. Takes on Monday.   Past Week at Unknown time   • rosuvastatin (CRESTOR) 10 MG tablet Take 20 mg by mouth Daily.   9/16/2016 at Unknown time   • sodium chloride 7 % nebulizer solution nebulizer solution USE 1 VIAL PER NEBULIZER TWICE A DAY  3 9/17/2016 at Unknown time   • tiotropium (SPIRIVA) 18 MCG per inhalation capsule Place 1 capsule into inhaler and inhale 1 (one) time daily. Patient takes the handi mist   9/17/2016 at Unknown time   • vitamin C (ASCORBIC ACID) 250 MG tablet Take 500 mg by mouth daily.   9/17/2016 at Unknown time   • calcium carbonate (OS-RADHA) 600 MG tablet Take 600 mg by mouth 2 (two) times a day with meals.   9/17/2016 at Unknown time   • doxycycline (MONODOX) 100 MG capsule Take 1 capsule by mouth 2 (two) times a day. 10 capsule 0    • HYDROcodone-acetaminophen (NORCO) 5-325 MG per tablet TK 1 T PO  Q 6 H PRN P  0 Past Week at Unknown time   • Probiotic Product (VSL#3 DS PO) Take 1 capsule by mouth Daily with lunch and dinner.   9/17/2016 at Unknown time   • saccharomyces boulardii (FLORASTOR) 250 MG capsule Take 1 capsule by mouth 2 (two) times a day. 60 capsule 0 9/17/2016 at Unknown time   • Unable to find Take 1 each by mouth 1 (one) time. Kefir probiotic drink    Taking     Allergies:  Demerol [meperidine]; Aminophylline; Avelox [moxifloxacin hcl in nacl]; Benadryl allergy [diphenhydramine  hcl (sleep)]; Biaxin [clarithromycin]; Codeine; Levaquin [levofloxacin in d5w]; Macrobid [nitrofurantoin monohyd macro]; Morphine and related; Penicillins; Sulfa antibiotics; and Latex    Review Of Systems:  H EENT: When seen by Dr. Reid thought that there was yeast present and use nystatin.  No dysphasia or on or mouth pain  CARDIAC: Denies palpitations, chest pain, coronary artery disease, or rhythm disturbance  GI: Denies dysphasia, change in bowel pattern, abdominal pain, stricture symptoms, fatty food intolerance.    : Denies dysuria, frequency, urgency, hematuria.  Denies recurrent UTIs, nephrolithiasis, incontinence, use of ERT  NEURO: Denies paresthesias, muscle weakness, ataxia.  Denies seizures, TIA, CVA, chronic headaches  PSYCH: Denies depression, bipolar disorder, anxiety, panic attacks, chemical dependency  HEM/ONC: Denies anemia, bleeding or bone marrow disease, or cancer          Vital Signs  Temp:  [97.2 °F (36.2 °C)-99 °F (37.2 °C)] 97.2 °F (36.2 °C)  Heart Rate:  [] 80  Resp:  [16-19] 16  BP: (104-148)/(62-80) 110/70  Body mass index is 19.49 kg/(m^2).    GENERAL: The patient is a  white female who appears her stated age. She appears to be a good historian  HEENT: PERRLA, pupils are 4 mm in size, EOMI, oral mucosa and pharynx is normal, some mild white areas that were removed easily.  No erythema to the tongue or other oral surfaces no ulcerations, no leukoplakia  NECK: Thyroid is not enlarged, ROM normal, no lymphadenopathy  CHEST: Normal  expansion, no retractions or tenderness, crease in AP diameter  LUNGS: Rhonchi in the upper right lobe cleared with coughing no rales or wheezes  HEART: Regular rate and rhythm, no murmur  BREAST: Not performed  ABDOMEN: Soft, not distended, and non tender with normal bowel sounds.  No masses or organomegaly  EXTREMITIES: Multiple areas of previous abrasions that are healing, left knee edematous but with good range of motion and without tenderness,  left great toe with erythema and tenderness with hammertoes bilaterally no skin breakdown evident between toes on either foot  NEUROLOGIC: She is alert and  oriented to place and time and events.  Muscle strength in upper and lower extremities is normal, patient not ambulated  BACK: Normal    .  Results Review:    I reviewed the patient's new clinical results.      Results from last 7 days  Lab Units 01/02/17  1800   WBC 10*3/mm3 14.59*   HEMOGLOBIN g/dL 12.1   PLATELETS 10*3/mm3 302       Results from last 7 days  Lab Units 01/02/17  1800   SODIUM mmol/L 135*   POTASSIUM mmol/L 4.1   CHLORIDE mmol/L 94*   TOTAL CO2 mmol/L 27.0   BUN mg/dL 12   CREATININE mg/dL 0.42*   CALCIUM mg/dL 9.2   GLUCOSE mg/dL 153*       Results from last 7 days  Lab Units 01/02/17  1759   INR  1.04     Hemoglobin A1C:  Lab Results   Component Value Date    HGBA1C 5.9 (H) 12/07/2014       Glucose Range:  GLUCOSE   Date/Time Value Ref Range Status   01/03/2017 0618 86 70 - 130 mg/dL Final         Date of Admission: 1/2/2017  Date of Discharge:  1/3/2017      Discharge Diagnosis:   Active Hospital Problems (** Indicates Principal Problem)    Diagnosis Date Noted   • **Pseudogout of left knee [M11.262] 01/03/2017   • Leukocytosis [D72.829] 01/03/2017   • Knee effusion, left [M25.462] 01/02/2017   • Type 2 diabetes mellitus, uncontrolled [E11.65] 07/30/2016   • Osteoporosis [M81.0] 07/30/2016   • COPD (chronic obstructive pulmonary disease) [J44.9] 07/30/2016   • Chronic adrenal insufficiency [E27.40] 07/30/2016      Resolved Hospital Problems    Diagnosis Date Noted Date Resolved   No resolved problems to display.       Hospital Course  Patient is a 81 y.o. female who presented with left knee pain and left great toe pain.  She was evaluated for the possibility of exacerbation of COPD which is not evident.  Her chest x-ray was normal.  Her left knee showed a effusion.  Radiology removed fluid that was consistent with pseudogout by crystal  analysis.  Culture so far is negative.  Few white blood cells were seen.  The distribution was 93% neutrophils with cloudy appearing fluid.  Dr. Basilio Burleson saw the patient in consult and felt that this was consistent with pseudogout.  He recommended Ultram now and then Indocin for the next week with ice and elevation.  There was tenderness to her left great toe and he wanted an x-ray before her being discharged today.  She was able to ambulate this morning and feels markedly better and believes she could return home.  He gave her a dose of Ultram this morning.  A Procalcitonin was normal but a CRP was elevated at 24.96.  I've recommended the Indocin at 3 times a day for 3 days then switch to twice a day.  There is no previous history of gastroesophageal reflux disease and/or peptic ulcer disease.  Patient has been warned to take medication with food and to return to hospital if she were developing symptoms suggestive for gastrointestinal bleeding.    There is a leukocytosis with 14.59 with 79% neutrophils.  She has had a persistent leukocytosis for a number of months with an increase in both monocytes and absolute neutrophils there are also some immature granulocytes seen.  There has not been an infection every time that this is been done.  She has not had an evaluation for the leukocytosis.  I have recommended to the patient and her daughter that she see the CBC group for an evaluation of the chronic leukocytosis in the event that there is any underlying chronic leukemia.      Because of the chronic adrenal insufficiency I recommended we increase her Medrol from 4 mg daily to 8 mg for 3 days in response to a stress reaction for the pseudogout.  My exam does not show evidence of an exacerbation of COPD and her chest x-ray did not show evidence of any infection.  We will not plan any antibiotics or changes in her medications.  The patient wishes to return home today and feels markedly improved with her  foot.      Condition on Discharge: Improved    Discharge Disposition home      Discharge Medications:   Gely Carlisle   Home Medication Instructions GWENDOLYN:[id number]    Printed on:01/03/17 0905   Medication Information                      albuterol (PROVENTIL) (2.5 MG/3ML) 0.083% nebulizer solution  Take 2.5 mg by nebulization every 4 (four) hours as needed for wheezing.             B Complex Vitamins (VITAMIN B COMPLEX) capsule capsule  Take 1 capsule by mouth Daily.             calcium carbonate (OS-RADHA) 600 MG tablet  Take 600 mg by mouth 2 (two) times a day with meals.             cetirizine (ZyrTEC) 10 MG tablet  Take 10 mg by mouth Daily.             Cranberry 1000 MG capsule  Take  by mouth.             doxycycline (MONODOX) 100 MG capsule  Take 1 capsule by mouth 2 (two) times a day.             fluticasone-salmeterol (ADVAIR) 100-50 MCG/DOSE DISKUS  Inhale 1 puff 2 (two) times a day. 250/50               Glucosamine-Chondroitin (GLUCOSAMINE CHONDROITIN COMPLX) 500-250 MG capsule  Take 3 capsules by mouth 2 (Two) Times a Day.             guaiFENesin (MUCINEX) 600 MG 12 hr tablet  Take 1,200 mg by mouth 2 (two) times a day.             HYDROcodone-acetaminophen (NORCO) 5-325 MG per tablet  TK 1 T PO  Q 6 H PRN P             levothyroxine (SYNTHROID, LEVOTHROID) 75 MCG tablet  Take 75 mcg by mouth daily.             methylPREDNISolone (MEDROL) 4 MG tablet  Take 4 mg by mouth Daily.             montelukast (SINGULAIR) 10 MG tablet  Take 10 mg by mouth every night.             Multiple Vitamins-Minerals (MULTIVITAMIN ADULT PO)  Take  by mouth Daily.             Probiotic Product (VSL#3 DS PO)  Take 1 capsule by mouth Daily with lunch and dinner.             risedronate (ACTONEL) 35 MG tablet  Take 35 mg by mouth Every 7 (Seven) Days. with water on empty stomach, nothing by mouth or lie down for next 30 minutes. Takes on Monday.             rosuvastatin (CRESTOR) 10 MG tablet  Take 20 mg by mouth Daily.              saccharomyces boulardii (FLORASTOR) 250 MG capsule  Take 1 capsule by mouth 2 (two) times a day.             sodium chloride 7 % nebulizer solution nebulizer solution  USE 1 VIAL PER NEBULIZER TWICE A DAY             tiotropium (SPIRIVA) 18 MCG per inhalation capsule  Place 1 capsule into inhaler and inhale 1 (one) time daily. Patient takes the handi mist             Unable to find  Take 1 each by mouth 1 (one) time. Kefir probiotic drink              vitamin C (ASCORBIC ACID) 250 MG tablet  Take 500 mg by mouth daily.                 Discharge Diet:  diabetic    Activity at Discharge:  Unlimited    Follow-up Appointments:  To see the CBC group in one month  Dr. Basilio Burleson if need is not improve  Continue with her other consultants.    Test Results Pending at Discharge  [unfilled]     Maryellen Fofana MD  01/03/17  9:05 AM    Time Spent on Discharge Activities: More than an hour for admission and discharge

## 2017-01-03 NOTE — ED PROVIDER NOTES
"Arthrocentesis  Date/Time: 1/3/2017 3:13 AM  Performed by: DOMINIC GREER  Authorized by: HENRY CAMPUZANO   Consent: Verbal consent obtained.  Risks and benefits: risks, benefits and alternatives were discussed  Consent given by: patient  Patient understanding: patient states understanding of the procedure being performed  Patient consent: the patient's understanding of the procedure matches consent given  Procedure consent: procedure consent matches procedure scheduled  Test results: test results available and properly labeled  Site marked: the operative site was marked  Imaging studies: imaging studies available  Required items: required blood products, implants, devices, and special equipment available  Patient identity confirmed: verbally with patient  Time out: Immediately prior to procedure a \"time out\" was called to verify the correct patient, procedure, equipment, support staff and site/side marked as required.  Indications: joint swelling, pain, possible septic joint and diagnostic evaluation   Body area: knee  Joint: left knee  Local anesthesia used: yes  Anesthesia: local infiltration    Anesthesia:  Local anesthesia used: yes  Anesthesia: local infiltration  Local Anesthetic: lidocaine 1% with epinephrine   Sedation:  Patient sedated: no    Preparation: Patient was prepped and draped in the usual sterile fashion.  Needle size: 18 G  Ultrasound guidance: no  Approach: lateral  Aspirate: serous  Aspirate amount: 60 mL  Patient tolerance: Patient tolerated the procedure well with no immediate complications  Comments: Knee wrapped with Ace afterwards          Documentation assistance provided by jillian Luke for Dominic Greer PA-C.  Information recorded by the scribe was done at my direction and has been verified and validated by me.         Anil Luke  01/02/17 [id number]       SIDDHARTHA Gill  01/03/17 0315    "

## 2017-01-03 NOTE — CONSULTS
Inpatient Consult to Orthopedic Surgery  Consult performed by: KATIE LOVE JR  Consult ordered by: HENRY CAMPUZANO  Reason for consult: knee swelling          Patient Care Team:  Arnaud Paredes MD as PCP - General (Family Medicine)    Chief complaint: knee swelling    Subjective     History of Present Illness   Patient is an 82 y/o female admitted for shortness of breath and found to have left knee pain and swelling.  She was admitted for an asthma exacerbation.  Left knee aspiration was performed by the ER physicians.    She reports the pain in her left knee has improved significantly in the last 12 hours since the aspirate.  She denies fevers, chills, shortness of breath.  She does report in the past 24 hrs she's developed some pain in the left metatarsophalangeal joint as well.      Review of Systems   Review of Systems:  Constitutional: Negative  HENT: Negative  Eyes: Negative  Respiratory: SOB per above, none currently  Cardiovascular: Negative; no current chest pain  Gastrointestinal: Negative  : Negative  Skin: Negative except as listed in HPI  Neurological: Negative, no numbness or deficits  Hematological: Negative  Muscoloskeletal: Per HPI      Past Medical History   Diagnosis Date   • Asthma    • Bronchiectasis    • Cancer      cervical and skin   • Colitis    • COPD (chronic obstructive pulmonary disease)    • Diabetes mellitus      hyperglycemia   • Disease of thyroid gland      hypothyroidism   • History of transfusion    • Hyperlipidemia    • Insufficiency, adrenal    • Leukocytosis    • Lymphedema    • MR (mitral regurgitation)    • MVP (mitral valve prolapse)    • Osteoarthritis    • Osteoporosis    • Osteoporosis    • Scoliosis    • Urinary tract infection    ,   Past Surgical History   Procedure Laterality Date   • Colon surgery  1983     RUPTURED DIVERTICULI  DYLAN WORLEY MD   • Cataract extraction     • Cholecystectomy       WITH COMMON BILE DUCT EXPLORATION REMOVAL OF STONE   •  Hernia repair  2012     ABDOMINAL LEFT UPPER   • Hysterectomy     • Joint replacement Right      hip  2002 DR BROWN   • Lung lobectomy Left 1955     BRONCHIECTESIS LOWER LOBE   • Tonsillectomy       with adennoidectomy   ,   Family History   Problem Relation Age of Onset   • Heart disease Mother    • Heart attack Mother    • Cancer Sister      BREAST   ,   Social History   Substance Use Topics   • Smoking status: Never Smoker   • Smokeless tobacco: None   • Alcohol use No   ,   Prescriptions Prior to Admission   Medication Sig Dispense Refill Last Dose   • albuterol (PROVENTIL) (2.5 MG/3ML) 0.083% nebulizer solution Take 2.5 mg by nebulization every 4 (four) hours as needed for wheezing.   9/17/2016 at Unknown time   • B Complex Vitamins (VITAMIN B COMPLEX) capsule capsule Take 1 capsule by mouth Daily.   9/17/2016 at Unknown time   • cetirizine (ZyrTEC) 10 MG tablet Take 10 mg by mouth Daily.   9/16/2016 at Unknown time   • Cranberry 1000 MG capsule Take  by mouth.      • fluticasone-salmeterol (ADVAIR) 100-50 MCG/DOSE DISKUS Inhale 1 puff 2 (two) times a day. 250/50     9/17/2016 at Unknown time   • Glucosamine-Chondroitin (GLUCOSAMINE CHONDROITIN COMPLX) 500-250 MG capsule Take 3 capsules by mouth 2 (Two) Times a Day.      • guaiFENesin (MUCINEX) 600 MG 12 hr tablet Take 1,200 mg by mouth 2 (two) times a day.   9/17/2016 at Unknown time   • levothyroxine (SYNTHROID, LEVOTHROID) 75 MCG tablet Take 75 mcg by mouth daily.   9/17/2016 at Unknown time   • methylPREDNISolone (MEDROL) 4 MG tablet Take 4 mg by mouth Daily.   9/17/2016 at Unknown time   • montelukast (SINGULAIR) 10 MG tablet Take 10 mg by mouth every night.   9/16/2016 at Unknown time   • Multiple Vitamins-Minerals (MULTIVITAMIN ADULT PO) Take  by mouth Daily.      • risedronate (ACTONEL) 35 MG tablet Take 35 mg by mouth Every 7 (Seven) Days. with water on empty stomach, nothing by mouth or lie down for next 30 minutes. Takes on Monday.   Past Week at  Unknown time   • rosuvastatin (CRESTOR) 10 MG tablet Take 20 mg by mouth Daily.   9/16/2016 at Unknown time   • sodium chloride 7 % nebulizer solution nebulizer solution USE 1 VIAL PER NEBULIZER TWICE A DAY  3 9/17/2016 at Unknown time   • tiotropium (SPIRIVA) 18 MCG per inhalation capsule Place 1 capsule into inhaler and inhale 1 (one) time daily. Patient takes the handi mist   9/17/2016 at Unknown time   • vitamin C (ASCORBIC ACID) 250 MG tablet Take 500 mg by mouth daily.   9/17/2016 at Unknown time   • calcium carbonate (OS-RADHA) 600 MG tablet Take 600 mg by mouth 2 (two) times a day with meals.   9/17/2016 at Unknown time   • doxycycline (MONODOX) 100 MG capsule Take 1 capsule by mouth 2 (two) times a day. 10 capsule 0    • HYDROcodone-acetaminophen (NORCO) 5-325 MG per tablet TK 1 T PO  Q 6 H PRN P  0 Past Week at Unknown time   • Probiotic Product (VSL#3 DS PO) Take 1 capsule by mouth Daily with lunch and dinner.   9/17/2016 at Unknown time   • saccharomyces boulardii (FLORASTOR) 250 MG capsule Take 1 capsule by mouth 2 (two) times a day. 60 capsule 0 9/17/2016 at Unknown time   • Unable to find Take 1 each by mouth 1 (one) time. Kefir probiotic drink    Taking   , Scheduled Meds:    ipratropium-albuterol 3 mL Nebulization 4x Daily - RT   lidocaine-EPINEPHrine 20 mL Injection Once   , Continuous Infusions:   , PRN Meds:  sodium chloride and Allergies:  Demerol [meperidine]; Aminophylline; Avelox [moxifloxacin hcl in nacl]; Benadryl allergy [diphenhydramine hcl (sleep)]; Biaxin [clarithromycin]; Codeine; Levaquin [levofloxacin in d5w]; Macrobid [nitrofurantoin monohyd macro]; Morphine and related; Penicillins; Sulfa antibiotics; and Latex    Objective      Vital Signs  Temp:  [97.2 °F (36.2 °C)-99 °F (37.2 °C)] 97.2 °F (36.2 °C)  Heart Rate:  [] 90  Resp:  [16-19] 16  BP: (104-148)/(62-80) 104/62       Physical Exam       Physical Exam:  Vital signs reviewed.  Constitutional:  Appears well-developed, well  nourished.  HEENT:  Head: normocephalic, atraumatic  Eyes: EOMI, grossly normal.  No scleral icterus.  Neck: Normal range of motion.  Supple, no tracheal deviation.  Cardiovascular: Regular rate.  Pulmonary: Effort normal, symmetric chest expansion, no labored breathing.  Abdominal: Soft, non distended  Neurological: No gross deficits, oriented to person, place, and time.  Skin: Warm and dry  Psychiatric: Normal mood and affect  Musculoskeletal:  Mild effusion noted in left knee.  She tolerates fair amount of range of motion, 10° to 70° with minimal pain.  Mildly tender diffusely around the knee joint.  She does noted to have severe hallux valgus deformity.  Very mild hyperemia of the  medial eminence.  She does have significant pain with light touch as well as motion at the metatarsophalangeal joint.  No palpable fluid collection.    Active dorsiflexion plantar flexion.  Nonpalpable pulses.  Foot is warm and perfused.  Sensation intact throughout.    Results Review:    Aspirate results from the ER show nucleated cell count ~22,000, positive for CPPD, negative GS/cultures.    Radiographs of the left knee independently reviewed, demonstrate mild/moderate tricompartmental degenerative changes, calcifications of mensici c/w pseudogout.  Severe arterial calcifications present.        Assessment/Plan     Active Problems:    Knee effusion, left      Assessment & Plan    This is an 81-year-old female presenting with left knee effusion and pain with aspirate most consistent with acute pseudogout.  No evidence of infection on clinical exam or an aspirate results.  Her knee pain is actually significantly improved since the aspiration.  She now has some hallux metatarsophalangeal joint pain as well, which I suspect is also related to pseudogout.      - Recommend conservative management including rest, elevation, gentle compression, anti-inflammatories (dose of Toradol followed by Indocin)  - Can follow cultures remotely but  very low clinical suspicion for septic arthritis  - Will obtain radiographs of the left foot, kacie plan to treat expectantly per above  - No weightbearing restrictions    - Patient can likely follow with PCP.  If patient request form orthopedic follow-up, she can follow up with me at [facility] in 4 weeks ([phone])    Thank you for this consultation.  Please call with questions.    Basilio Burleson Jr, MD  01/03/17  6:26 AM

## 2017-01-03 NOTE — PROGRESS NOTES
"Adult Nutrition  Assessment/PES    Patient Name:  Gely Carlisle  YOB: 1935  MRN: [id number]  Admit Date:  1/2/2017    Assessment Date:  1/3/2017     Consult complete-spoke with patient and stated appetite is poor, -107#, currently 110#-likes glucerna shakes-ordered BID; RD to monitor and follow           Reason for Assessment       01/03/17 1016    Reason for Assessment    Reason For Assessment/Visit identified at risk by screening criteria;nurse/nurse practitioner consult                Anthropometrics       01/03/17 1016    Anthropometrics (Special Considerations)    Height Used for Calculations 1.6 m (5' 3\")    Weight Used for Calculations 49.9 kg (110 lb)    RD Calculated     RD Calculated % IBW 95    RD Calculated BMI (kg/m2) 19.4    Usual Body Weight (UBW)    Usual Body Weight --   105-107#    Body Mass Index (BMI)    BMI Grade 19.1 - 24.9 - normal            Labs/Tests/Procedures/Meds       01/03/17 1017    Labs/Tests/Procedures/Meds    Diagnostic Test/Procedure Review reviewed    Labs/Tests Review Reviewed;Na+;C-React PRO    Medication Review Reviewed, pertinent    Significant Vitals reviewed            Physical Findings       01/03/17 1017    Physical Findings/Assessment    Additional Documentation --   B=17            Estimated/Assessed Needs       01/03/17 1017    Calculation Measurements    Weight Used For Calculations 49.9 kg (110 lb)    Height Used for Calculations 1.6 m (5' 3\")    Estimated/Assessed Energy Needs    Energy Need Method Kcal/kg    kcal/kg 30    30 Kcal/Kg (kcal) 1496.88    Estimated Kcal Range  0038-0666    Estimated/Assessed Protein Needs    Weight Used for Protein Calculation 49.9 kg (110 lb)    Protein (gm/kg) 0.8    0.8 Gm Protein (gm) 39.92    Estimated/Assessed Fluid Needs    Fluid Need Method RDA method    RDA Method (mL)  1300            Nutrition Prescription Ordered       01/03/17 1017    Nutrition Prescription PO    Current PO Diet Regular    "             Problem/Interventions:        Problem 1       01/03/17 1018    Nutrition Diagnoses Problem 1    Problem 1 Inadequate Nutrient Intake    Etiology (related to) MNT for Treatment/Condition    Signs/Symptoms (evidenced by) Report of Mnimal PO Intake                    Intervention Goal       01/03/17 1018    Intervention Goal    General Maintain nutrition    PO Increase intake    Weight Maintain weight            Nutrition Intervention       01/03/17 1018    Nutrition Intervention    RD/Tech Action Interview for preference;Follow Tx progress;Care plan reviewd;Supplement provided;Encourage intake            Nutrition Prescription       01/03/17 1018    Nutrition Prescription PO    PO Prescription Begin/change supplement    Supplement Glucerna Shake    Supplement Frequency 2 times a day    New PO Prescription Ordered? Yes            Education/Evaluation       01/03/17 1018    Education    Education Will Instruct as appropriate    Monitor/Evaluation    Monitor Per protocol    Education Follow-up Reinforce PRN          Electronically signed by:  Shelbi Cruz RD  01/03/17 10:18 AM

## 2017-01-03 NOTE — PLAN OF CARE
Problem: Patient Care Overview (Adult)  Goal: Plan of Care Review  Outcome: Ongoing (interventions implemented as appropriate)    01/03/17 0228   Coping/Psychosocial Response Interventions   Plan Of Care Reviewed With patient   Patient Care Overview   Progress no change   Outcome Evaluation   Outcome Summary/Follow up Plan patient admitted from ED with fluid on knee, aspiration done in ED, ortho consulted, patient able to ambulate with assistance       Goal: Adult Individualization and Mutuality  Outcome: Ongoing (interventions implemented as appropriate)  Goal: Discharge Needs Assessment  Outcome: Ongoing (interventions implemented as appropriate)    Problem: Fall Risk (Adult)  Goal: Identify Related Risk Factors and Signs and Symptoms  Outcome: Outcome(s) achieved Date Met:  01/03/17  Goal: Absence of Falls  Outcome: Ongoing (interventions implemented as appropriate)    Problem: Infection, Risk/Actual (Adult)  Goal: Identify Related Risk Factors and Signs and Symptoms  Outcome: Outcome(s) achieved Date Met:  01/03/17  Goal: Infection Prevention/Resolution  Outcome: Ongoing (interventions implemented as appropriate)

## 2017-01-04 LAB
BH CV LOWER VASCULAR LEFT COMMON FEMORAL AUGMENT: NORMAL
BH CV LOWER VASCULAR LEFT COMMON FEMORAL COMPETENT: NORMAL
BH CV LOWER VASCULAR LEFT COMMON FEMORAL COMPRESS: NORMAL
BH CV LOWER VASCULAR LEFT COMMON FEMORAL PHASIC: NORMAL
BH CV LOWER VASCULAR LEFT COMMON FEMORAL SPONT: NORMAL
BH CV LOWER VASCULAR LEFT DISTAL FEMORAL PHASIC: NORMAL
BH CV LOWER VASCULAR LEFT DISTAL FEMORAL SPONT: NORMAL
BH CV LOWER VASCULAR LEFT GASTRONEMIUS COMPRESS: NORMAL
BH CV LOWER VASCULAR LEFT GREATER SAPH AK COMPRESS: NORMAL
BH CV LOWER VASCULAR LEFT GREATER SAPH BK COMPRESS: NORMAL
BH CV LOWER VASCULAR LEFT LESSER SAPH COMPRESS: NORMAL
BH CV LOWER VASCULAR LEFT MID FEMORAL AUGMENT: NORMAL
BH CV LOWER VASCULAR LEFT MID FEMORAL COMPETENT: NORMAL
BH CV LOWER VASCULAR LEFT MID FEMORAL COMPRESS: NORMAL
BH CV LOWER VASCULAR LEFT MID FEMORAL PHASIC: NORMAL
BH CV LOWER VASCULAR LEFT MID FEMORAL SPONT: NORMAL
BH CV LOWER VASCULAR LEFT PERONEAL COMPRESS: NORMAL
BH CV LOWER VASCULAR LEFT POPLITEAL AUGMENT: NORMAL
BH CV LOWER VASCULAR LEFT POPLITEAL COMPETENT: NORMAL
BH CV LOWER VASCULAR LEFT POPLITEAL PHASIC: NORMAL
BH CV LOWER VASCULAR LEFT POPLITEAL SPONT: NORMAL
BH CV LOWER VASCULAR LEFT POSTERIOR TIBIAL COMPRESS: NORMAL
BH CV LOWER VASCULAR LEFT PROXIMAL FEMORAL COMPRESS: NORMAL
BH CV LOWER VASCULAR LEFT SAPHENOFEMORAL JUNCTION AUGMENT: NORMAL
BH CV LOWER VASCULAR LEFT SAPHENOFEMORAL JUNCTION COMPETENT: NORMAL
BH CV LOWER VASCULAR LEFT SAPHENOFEMORAL JUNCTION COMPRESS: NORMAL
BH CV LOWER VASCULAR LEFT SAPHENOFEMORAL JUNCTION PHASIC: NORMAL
BH CV LOWER VASCULAR LEFT SAPHENOFEMORAL JUNCTION SPONT: NORMAL
BH CV LOWER VASCULAR RIGHT COMMON FEMORAL AUGMENT: NORMAL
BH CV LOWER VASCULAR RIGHT COMMON FEMORAL COMPETENT: NORMAL
BH CV LOWER VASCULAR RIGHT COMMON FEMORAL COMPRESS: NORMAL
BH CV LOWER VASCULAR RIGHT COMMON FEMORAL PHASIC: NORMAL
BH CV LOWER VASCULAR RIGHT COMMON FEMORAL SPONT: NORMAL
BH CV POP FLUID COLLECT LEFT: 1

## 2017-01-07 LAB
BACTERIA FLD CULT: NORMAL
BACTERIA SPEC AEROBE CULT: NORMAL
BACTERIA SPEC AEROBE CULT: NORMAL
GRAM STN SPEC: NORMAL
GRAM STN SPEC: NORMAL

## 2017-05-23 ENCOUNTER — HOSPITAL ENCOUNTER (OUTPATIENT)
Dept: GENERAL RADIOLOGY | Facility: HOSPITAL | Age: 82
Discharge: HOME | End: 2017-05-23
Admitting: PODIATRIST

## 2017-05-23 DIAGNOSIS — L97.519: ICD-10-CM

## 2017-05-23 PROCEDURE — 73660 X-RAY EXAM OF TOE(S): CPT

## 2017-05-26 ENCOUNTER — OFFICE VISIT (OUTPATIENT)
Dept: CARDIOLOGY | Facility: CLINIC | Age: 82
End: 2017-05-26

## 2017-05-26 ENCOUNTER — TRANSCRIBE ORDERS (OUTPATIENT)
Dept: CARDIOLOGY | Facility: CLINIC | Age: 82
End: 2017-05-26

## 2017-05-26 VITALS
WEIGHT: 105 LBS | DIASTOLIC BLOOD PRESSURE: 80 MMHG | HEIGHT: 63 IN | HEART RATE: 82 BPM | OXYGEN SATURATION: 95 % | SYSTOLIC BLOOD PRESSURE: 112 MMHG | BODY MASS INDEX: 18.61 KG/M2

## 2017-05-26 DIAGNOSIS — Z13.6 ENCOUNTER FOR SCREENING FOR VASCULAR DISEASE: Primary | ICD-10-CM

## 2017-05-26 DIAGNOSIS — I34.0 MITRAL VALVE INSUFFICIENCY, UNSPECIFIED ETIOLOGY: Primary | ICD-10-CM

## 2017-05-26 PROCEDURE — 93000 ELECTROCARDIOGRAM COMPLETE: CPT | Performed by: PHYSICIAN ASSISTANT

## 2017-05-26 PROCEDURE — 99213 OFFICE O/P EST LOW 20 MIN: CPT | Performed by: PHYSICIAN ASSISTANT

## 2017-05-26 RX ORDER — CETIRIZINE HYDROCHLORIDE 10 MG/1
10 TABLET ORAL
COMMUNITY
End: 2017-05-26

## 2017-05-26 RX ORDER — ROSUVASTATIN CALCIUM 20 MG/1
TABLET, FILM COATED ORAL
Refills: 3 | COMMUNITY
Start: 2017-04-29 | End: 2017-05-26

## 2017-05-26 RX ORDER — ALBUTEROL SULFATE 2.5 MG/3ML
2.5 SOLUTION RESPIRATORY (INHALATION)
COMMUNITY
End: 2017-05-26

## 2017-05-26 RX ORDER — RISEDRONATE SODIUM 35 MG/1
35 TABLET, FILM COATED ORAL
COMMUNITY
End: 2017-05-26

## 2017-07-07 DIAGNOSIS — E27.40 CHRONIC ADRENAL INSUFFICIENCY (HCC): Primary | ICD-10-CM

## 2017-07-07 DIAGNOSIS — Z79.899 HIGH RISK MEDICATION USE: ICD-10-CM

## 2017-07-07 DIAGNOSIS — IMO0002 UNCONTROLLED TYPE 2 DIABETES MELLITUS WITH OTHER CIRCULATORY COMPLICATION, WITHOUT LONG-TERM CURRENT USE OF INSULIN: ICD-10-CM

## 2017-07-07 DIAGNOSIS — E03.9 ACQUIRED HYPOTHYROIDISM: ICD-10-CM

## 2017-07-07 DIAGNOSIS — E78.5 HYPERLIPIDEMIA, UNSPECIFIED HYPERLIPIDEMIA TYPE: ICD-10-CM

## 2017-07-12 ENCOUNTER — RESULTS ENCOUNTER (OUTPATIENT)
Dept: FAMILY MEDICINE CLINIC | Facility: CLINIC | Age: 82
End: 2017-07-12

## 2017-07-12 DIAGNOSIS — E78.5 HYPERLIPIDEMIA, UNSPECIFIED HYPERLIPIDEMIA TYPE: ICD-10-CM

## 2017-07-12 DIAGNOSIS — Z79.899 HIGH RISK MEDICATION USE: ICD-10-CM

## 2017-07-12 DIAGNOSIS — E03.9 ACQUIRED HYPOTHYROIDISM: ICD-10-CM

## 2017-07-12 DIAGNOSIS — IMO0002 UNCONTROLLED TYPE 2 DIABETES MELLITUS WITH OTHER CIRCULATORY COMPLICATION, WITHOUT LONG-TERM CURRENT USE OF INSULIN: ICD-10-CM

## 2017-08-10 LAB
ALBUMIN SERPL-MCNC: 3.7 G/DL (ref 3.5–5.2)
ALBUMIN/GLOB SERPL: 1.2 G/DL
ALP SERPL-CCNC: 50 U/L (ref 39–117)
ALT SERPL-CCNC: 15 U/L (ref 1–33)
AST SERPL-CCNC: 18 U/L (ref 1–32)
BASOPHILS # BLD AUTO: 0.02 10*3/MM3 (ref 0–0.2)
BASOPHILS NFR BLD AUTO: 0.2 % (ref 0–1.5)
BILIRUB SERPL-MCNC: 0.3 MG/DL (ref 0.1–1.2)
BUN SERPL-MCNC: 14 MG/DL (ref 8–23)
BUN/CREAT SERPL: 24.6 (ref 7–25)
CALCIUM SERPL-MCNC: 9.7 MG/DL (ref 8.6–10.5)
CHLORIDE SERPL-SCNC: 103 MMOL/L (ref 98–107)
CHOLEST SERPL-MCNC: 197 MG/DL (ref 0–200)
CO2 SERPL-SCNC: 29.9 MMOL/L (ref 22–29)
CREAT SERPL-MCNC: 0.57 MG/DL (ref 0.57–1)
EOSINOPHIL # BLD AUTO: 0.13 10*3/MM3 (ref 0–0.7)
EOSINOPHIL NFR BLD AUTO: 1.3 % (ref 0.3–6.2)
ERYTHROCYTE [DISTWIDTH] IN BLOOD BY AUTOMATED COUNT: 19 % (ref 11.7–13)
GLOBULIN SER CALC-MCNC: 3.1 GM/DL
GLUCOSE SERPL-MCNC: 88 MG/DL (ref 65–99)
HBA1C MFR BLD: 5.96 % (ref 4.8–5.6)
HCT VFR BLD AUTO: 45.5 % (ref 35.6–45.5)
HDLC SERPL-MCNC: 73 MG/DL (ref 40–60)
HGB BLD-MCNC: 13.7 G/DL (ref 11.9–15.5)
IMM GRANULOCYTES # BLD: 0.04 10*3/MM3 (ref 0–0.03)
IMM GRANULOCYTES NFR BLD: 0.4 % (ref 0–0.5)
INTERPRETATION: NORMAL
LDLC SERPL CALC-MCNC: 107 MG/DL (ref 0–100)
LYMPHOCYTES # BLD AUTO: 3.06 10*3/MM3 (ref 0.9–4.8)
LYMPHOCYTES NFR BLD AUTO: 29.5 % (ref 19.6–45.3)
MCH RBC QN AUTO: 27.3 PG (ref 26.9–32)
MCHC RBC AUTO-ENTMCNC: 30.1 G/DL (ref 32.4–36.3)
MCV RBC AUTO: 90.8 FL (ref 80.5–98.2)
MONOCYTES # BLD AUTO: 1.36 10*3/MM3 (ref 0.2–1.2)
MONOCYTES NFR BLD AUTO: 13.1 % (ref 5–12)
NEUTROPHILS # BLD AUTO: 5.76 10*3/MM3 (ref 1.9–8.1)
NEUTROPHILS NFR BLD AUTO: 55.5 % (ref 42.7–76)
PLATELET # BLD AUTO: 276 10*3/MM3 (ref 140–500)
POTASSIUM SERPL-SCNC: 4.1 MMOL/L (ref 3.5–5.2)
PROT SERPL-MCNC: 6.8 G/DL (ref 6–8.5)
RBC # BLD AUTO: 5.01 10*6/MM3 (ref 3.9–5.2)
SODIUM SERPL-SCNC: 144 MMOL/L (ref 136–145)
T4 FREE SERPL-MCNC: 1.36 NG/DL (ref 0.93–1.7)
TRIGL SERPL-MCNC: 85 MG/DL (ref 0–150)
TSH SERPL DL<=0.005 MIU/L-ACNC: 2.16 MIU/ML (ref 0.27–4.2)
VLDLC SERPL CALC-MCNC: 17 MG/DL (ref 5–40)
WBC # BLD AUTO: 10.37 10*3/MM3 (ref 4.5–10.7)

## 2017-08-16 ENCOUNTER — OFFICE VISIT (OUTPATIENT)
Dept: FAMILY MEDICINE CLINIC | Facility: CLINIC | Age: 82
End: 2017-08-16

## 2017-08-16 VITALS
TEMPERATURE: 98 F | BODY MASS INDEX: 18.78 KG/M2 | OXYGEN SATURATION: 98 % | WEIGHT: 106 LBS | HEART RATE: 75 BPM | HEIGHT: 63 IN

## 2017-08-16 DIAGNOSIS — I89.0 LYMPHEDEMA: ICD-10-CM

## 2017-08-16 DIAGNOSIS — E03.9 ACQUIRED HYPOTHYROIDISM: ICD-10-CM

## 2017-08-16 DIAGNOSIS — J44.9 CHRONIC OBSTRUCTIVE PULMONARY DISEASE, UNSPECIFIED COPD TYPE (HCC): ICD-10-CM

## 2017-08-16 DIAGNOSIS — M81.0 OSTEOPOROSIS: Primary | ICD-10-CM

## 2017-08-16 DIAGNOSIS — J45.909 UNCOMPLICATED ASTHMA, UNSPECIFIED ASTHMA SEVERITY: ICD-10-CM

## 2017-08-16 PROCEDURE — 99213 OFFICE O/P EST LOW 20 MIN: CPT | Performed by: FAMILY MEDICINE

## 2017-08-16 NOTE — PROGRESS NOTES
HPI  Gely Carlisle is a 81 y.o. female who is here for follow up of multiple medical problems especially over the last year.  She had a fall several months ago which resulted in a skin wound on her leg which took several weeks to heal.  Has known severe lung disease followed by pulmonary specialist and continues with recurrent wheezing.  Also known osteoporosis and is followed by her gynecologist and is to schedule follow-up bone density testing as well as mammography.  Reviewed difficulties over the last year which include Pseudomonas lung infection as well as skin infections and other difficulties.  Reviewed recent lab work all of which was unremarkable.      Review of Systems   HENT: Positive for postnasal drip.    Respiratory: Positive for shortness of breath and wheezing.    Cardiovascular: Positive for leg swelling.   Genitourinary: Positive for frequency.   Allergic/Immunologic: Positive for immunocompromised state.   All other systems reviewed and are negative.        Past Medical History:   Diagnosis Date   • Asthma    • Bronchiectasis    • Cancer     cervical and skin   • Colitis    • COPD (chronic obstructive pulmonary disease)    • Diabetes mellitus     hyperglycemia   • Disease of thyroid gland     hypothyroidism   • History of transfusion    • Hyperlipidemia    • Insufficiency, adrenal    • Leukocytosis    • Lymphedema    • MR (mitral regurgitation)    • MVP (mitral valve prolapse)    • Osteoarthritis    • Osteoporosis    • Osteoporosis    • Scoliosis    • Urinary tract infection        Past Surgical History:   Procedure Laterality Date   • CATARACT EXTRACTION     • CHOLECYSTECTOMY      WITH COMMON BILE DUCT EXPLORATION REMOVAL OF STONE   • COLON SURGERY  1983    RUPTURED DIVERTICULI  DYLAN WORLEY MD   • HERNIA REPAIR  2012    ABDOMINAL LEFT UPPER   • HYSTERECTOMY     • HYSTERECTOMY     • JOINT REPLACEMENT Right     hip  2002 DR BROWN   • LUNG LOBECTOMY Left 1955    BRONCHIECTESIS LOWER LOBE   •  TONSILLECTOMY      with adennoidectomy       Family History   Problem Relation Age of Onset   • Heart disease Mother    • Heart attack Mother    • Cancer Sister      BREAST   • No Known Problems Father    • Heart attack Maternal Grandmother    • Heart disease Maternal Grandmother    • Stroke Maternal Grandfather    • No Known Problems Paternal Grandmother    • No Known Problems Paternal Grandfather        Social History     Social History   • Marital status:      Spouse name: N/A   • Number of children: N/A   • Years of education: N/A     Occupational History   • Not on file.     Social History Main Topics   • Smoking status: Never Smoker   • Smokeless tobacco: Not on file   • Alcohol use No   • Drug use: No   • Sexual activity: No     Other Topics Concern   • Not on file     Social History Narrative         Physical Exam   Constitutional: She is oriented to person, place, and time. She appears well-developed and well-nourished. No distress.   HENT:   Head: Normocephalic.   Mouth/Throat: Oropharynx is clear and moist.   Eyes: EOM are normal. Pupils are equal, round, and reactive to light.   Neck: No thyromegaly present.   Cardiovascular: Normal rate and regular rhythm.    Murmur heard.  Pulmonary/Chest: Effort normal. No respiratory distress. She has wheezes.   Abdominal: Soft. She exhibits no distension. There is no tenderness.   Musculoskeletal: She exhibits edema and deformity.   Kyphoscoliosis noted.  Also ankle edema right greater than left   Neurological: She is alert and oriented to person, place, and time.   Skin: Skin is warm and dry.   Psychiatric: She has a normal mood and affect. Her behavior is normal. Judgment and thought content normal.   Vitals reviewed.        Assessment/Plan    Gely was seen today for diabetes and edema.    Diagnoses and all orders for this visit:    Osteoporosis    Chronic obstructive pulmonary disease, unspecified COPD type    Lymphedema    Acquired  hypothyroidism    Uncomplicated asthma, unspecified asthma severity        Patient is here for routine follow-up of multiple medical problems some of which are noted above.  Over the last year she has had multiple complications including skin infections and open wounds.  Pseudomonas lung infection.  Pseudogout of the knee.  She has been unable to follow-up with pulmonary rehabilitation.  Was recently seen by cardiology for valve disease which seems to be stable.  Also recent lab work all reviewed and basically was unremarkable.  Seems stable on current regimen which will be continued with follow-up in 6 months or as needed.    This note includes information entered using a voice recognition dictation system.  Though reviewed, some nonsensible errors may remain.

## 2017-09-22 ENCOUNTER — LAB REQUISITION (OUTPATIENT)
Dept: LAB | Facility: HOSPITAL | Age: 82
End: 2017-09-22

## 2017-09-22 DIAGNOSIS — N39.0 URINARY TRACT INFECTION: ICD-10-CM

## 2017-09-22 PROCEDURE — 87186 SC STD MICRODIL/AGAR DIL: CPT | Performed by: UROLOGY

## 2017-09-22 PROCEDURE — 87086 URINE CULTURE/COLONY COUNT: CPT | Performed by: UROLOGY

## 2017-09-26 LAB
BACTERIA SPEC AEROBE CULT: ABNORMAL
BACTERIA SPEC AEROBE CULT: ABNORMAL

## 2017-10-04 ENCOUNTER — APPOINTMENT (OUTPATIENT)
Dept: WOMENS IMAGING | Facility: HOSPITAL | Age: 82
End: 2017-10-04

## 2017-10-04 PROCEDURE — 77080 DXA BONE DENSITY AXIAL: CPT | Performed by: RADIOLOGY

## 2017-10-04 PROCEDURE — MDREVIEWSP: Performed by: RADIOLOGY

## 2017-10-04 PROCEDURE — 77063 BREAST TOMOSYNTHESIS BI: CPT | Performed by: RADIOLOGY

## 2017-10-04 PROCEDURE — G0202 SCR MAMMO BI INCL CAD: HCPCS | Performed by: RADIOLOGY

## 2017-11-12 ENCOUNTER — HOSPITAL ENCOUNTER (OUTPATIENT)
Dept: CARDIOLOGY | Facility: HOSPITAL | Age: 82
Discharge: HOME | End: 2017-11-12
Attending: INTERNAL MEDICINE

## 2017-11-12 DIAGNOSIS — Z13.6 ENCOUNTER FOR SCREENING FOR VASCULAR DISEASE: ICD-10-CM

## 2017-11-14 LAB
BH CV XLRA MEAS - PAD RIGHT LEG PT: 118 MMHG
BH CV XLRA MEAS - PROX AO DIAM: 1.6 CM
BH CV XLRA MEAS LEFT ICA/CCA RATIO: 1.6
BH CV XLRA MEAS LEFT MID CCA PSV: NORMAL CM/SEC
BH CV XLRA MEAS LEFT MID ICA PSV: NORMAL CM/SEC
BH CV XLRA MEAS LEFT PROX ECA PSV: 75 CM/SEC
BH CV XLRA MEAS RIGHT ICA/CCA RATIO: 1.1
BH CV XLRA MEAS RIGHT MID CCA PSV: NORMAL CM/SEC
BH CV XLRA MEAS RIGHT MID ICA PSV: NORMAL CM/SEC
BH CV XLRA MEAS RIGHT PROX ECA PSV: 82 CM/SEC

## 2018-02-05 ENCOUNTER — OFFICE VISIT (OUTPATIENT)
Dept: RETAIL CLINIC | Facility: CLINIC | Age: 83
End: 2018-02-05

## 2018-02-05 VITALS
DIASTOLIC BLOOD PRESSURE: 74 MMHG | RESPIRATION RATE: 16 BRPM | TEMPERATURE: 98.7 F | HEART RATE: 80 BPM | OXYGEN SATURATION: 96 % | SYSTOLIC BLOOD PRESSURE: 128 MMHG

## 2018-02-05 DIAGNOSIS — R11.0 NAUSEA: ICD-10-CM

## 2018-02-05 DIAGNOSIS — B37.0 THRUSH: Primary | ICD-10-CM

## 2018-02-05 DIAGNOSIS — Z20.828 EXPOSURE TO THE FLU: ICD-10-CM

## 2018-02-05 LAB
EXPIRATION DATE: NORMAL
FLUAV AG NPH QL: NORMAL
FLUBV AG NPH QL: NORMAL
INTERNAL CONTROL: NORMAL
Lab: NORMAL

## 2018-02-05 PROCEDURE — 87804 INFLUENZA ASSAY W/OPTIC: CPT | Performed by: NURSE PRACTITIONER

## 2018-02-05 PROCEDURE — 99213 OFFICE O/P EST LOW 20 MIN: CPT | Performed by: NURSE PRACTITIONER

## 2018-02-05 RX ORDER — OSELTAMIVIR PHOSPHATE 75 MG/1
75 CAPSULE ORAL DAILY
Qty: 10 CAPSULE | Refills: 0 | Status: SHIPPED | OUTPATIENT
Start: 2018-02-05 | End: 2018-02-15

## 2018-02-05 RX ORDER — METHYLPREDNISOLONE 4 MG/1
4 TABLET ORAL DAILY
COMMUNITY
End: 2018-05-30

## 2018-02-05 NOTE — PATIENT INSTRUCTIONS
Oral Thrush, Adult  Oral thrush, also called oral candidiasis, is a fungal infection that develops in the mouth and throat and on the tongue. It causes white patches to form on the mouth and tongue. Thrush is most common in older adults, but it can occur at any age.  Many cases of thrush are mild, but this infection can also be serious. Thrush can be a repeated (recurrent) problem for certain people who have a weak body defense system (immune system). The weakness can be caused by chronic illnesses, or by taking medicines that limit the body's ability to fight infection. If a person has difficulty fighting infection, the fungus that causes thrush can spread through the body. This can cause life-threatening blood or organ infections.  What are the causes?  This condition is caused by a fungus (yeast) called Candida albicans.  · This fungus is normally present in small amounts in the mouth and on other mucous membranes. It usually causes no harm.  · If conditions are present that allow the fungus to grow without control, it invades surrounding tissues and becomes an infection.  · Other Candida species can also lead to thrush (rare).  What increases the risk?  This condition is more likely to develop in:  · People with a weakened immune system.  · Older adults.  · People with HIV (human immunodeficiency virus).  · People with diabetes.  · People with dry mouth (xerostomia).  · Pregnant women.  · People with poor dental care, especially people who have false teeth.  · People who use antibiotic medicines.  What are the signs or symptoms?  Symptoms of this condition can vary from mild and moderate to severe and persistent. Symptoms may include:  · A burning feeling in the mouth and throat. This can occur at the start of a thrush infection.  · White patches that stick to the mouth and tongue. The tissue around the patches may be red, raw, and painful. If rubbed (during tooth brushing, for example), the patches and the  tissue of the mouth may bleed easily.  · A bad taste in the mouth or difficulty tasting foods.  · A cottony feeling in the mouth.  · Pain during eating and swallowing.  · Poor appetite.  · Cracking at the corners of the mouth.  How is this diagnosed?  This condition is diagnosed based on:  · Physical exam. Your health care provider will look in your mouth.  · Health history. Your health care provider will ask you questions about your health.  How is this treated?  This condition is treated with medicines called antifungals, which prevent the growth of fungi. These medicines are either applied directly to the affected area (topical) or swallowed (oral). The treatment will depend on the severity of the condition.  Mild thrush   Mild cases of thrush may clear up with the use of an antifungal mouth rinse or lozenges. Treatment usually lasts about 14 days.  Moderate to severe thrush  · More severe thrush infections that have spread to the esophagus are treated with an oral antifungal medicine. A topical antifungal medicine may also be used.  · For some severe infections, treatment may need to continue for more than 14 days.  · Oral antifungal medicines are rarely used during pregnancy because they may be harmful to the unborn child. If you are pregnant, talk with your health care provider about options for treatment.  Persistent or recurrent thrush   For cases of thrush that do not go away or keep coming back:  · Treatment may be needed twice as long as the symptoms last.  · Treatment will include both oral and topical antifungal medicines.  · People with a weakened immune system can take an antifungal medicine on a continuous basis to prevent thrush infections.  It is important to treat conditions that make a person more likely to get thrush, such as diabetes or HIV.  Follow these instructions at home:  Medicines  · Take over-the-counter and prescription medicines only as told by your health care provider.  · Talk with  your health care provider about an over-the-counter medicine called gentian violet, which kills bacteria and fungi.  Relieving soreness and discomfort   To help reduce the discomfort of thrush:  · Drink cold liquids such as water or iced tea.  · Try flavored ice treats or frozen juices.  · Eat foods that are easy to swallow, such as gelatin, ice cream, or custard.  · Try drinking from a straw if the patches in your mouth are painful.  General instructions  · Eat plain, unflavored yogurt as directed by your health care provider. Check the label to make sure the yogurt contains live cultures. This yogurt can help healthy bacteria to grow in the mouth and can stop the growth of the fungus that causes thrush.  · If you wear dentures, remove the dentures before going to bed, brush them vigorously, and soak them in a cleaning solution as directed by your health care provider.  · Rinse your mouth with a warm salt-water mixture several times a day. To make a salt-water mixture, completely dissolve 1/2-1 tsp of salt in 1 cup of warm water.  Contact a health care provider if:  · Your symptoms are getting worse or are not improving within 7 days of starting treatment.  · You have symptoms of a spreading infection, such as white patches on the skin outside of the mouth.  This information is not intended to replace advice given to you by your health care provider. Make sure you discuss any questions you have with your health care provider.  Document Released: 09/12/2005 Document Revised: 09/11/2017 Document Reviewed: 09/11/2017  [employer] Interactive Patient Education © 2017 [employer]

## 2018-02-05 NOTE — PROGRESS NOTES
Subjective:     Gely Carlisle is a 82 y.o.     Influenza   Associated symptoms include chills, congestion, coughing (baseline), fatigue and nausea. Pertinent negatives include no fever, myalgias, rash, sore throat or vomiting. She has tried nothing for the symptoms.         The following portions of the patient's history were reviewed and updated as appropriate: allergies, current medications, past family history, past medical history, past social history, past surgical history and problem list.      Review of Systems   Constitutional: Positive for chills and fatigue. Negative for fever. Appetite change: decreased.   HENT: Positive for congestion. Negative for sinus pain, sinus pressure and sore throat.    Respiratory: Positive for cough (baseline), shortness of breath (baseline) and wheezing (intermittent).    Cardiovascular:        See hx   Gastrointestinal: Positive for nausea. Negative for diarrhea and vomiting.   Musculoskeletal: Negative for myalgias.   Skin: Negative for color change, pallor and rash.   Neurological: Negative for dizziness and light-headedness.         Objective:      Physical Exam   Constitutional: She is oriented to person, place, and time. She appears well-developed and well-nourished. She is cooperative.   HENT:   Head: Normocephalic and atraumatic.   Tongue coated white   Eyes: EOM and lids are normal. Pupils are equal, round, and reactive to light.   Neck: Normal range of motion. Neck supple.   Cardiovascular: Normal rate, regular rhythm and normal heart sounds.    Pulmonary/Chest: Effort normal. Decreased breath sounds: mildly decreased.   Abdominal: Soft. Bowel sounds are normal. There is no tenderness.   Musculoskeletal: Normal range of motion.   Neurological: She is alert and oriented to person, place, and time.   Skin: Skin is warm and dry.   Psychiatric: She has a normal mood and affect. Her speech is normal and behavior is normal. Thought content normal.   Vitals  reviewed.          Diagnoses and all orders for this visit:    Thrush    Nausea  -     POC Influenza A / B    Exposure to the flu    Other orders  -     nystatin (MYCOSTATIN) 529352 UNIT/ML suspension; Swish and swallow 5 mL 4 (Four) Times a Day for 12 days.  -     oseltamivir (TAMIFLU) 75 MG capsule; Take 1 capsule by mouth Daily for 10 days.  If symptoms worsen or persist follow up with primary care provider.

## 2018-02-19 ENCOUNTER — TELEPHONE (OUTPATIENT)
Dept: FAMILY MEDICINE CLINIC | Facility: CLINIC | Age: 83
End: 2018-02-19

## 2018-02-19 DIAGNOSIS — E78.5 HYPERLIPIDEMIA, UNSPECIFIED HYPERLIPIDEMIA TYPE: ICD-10-CM

## 2018-02-19 DIAGNOSIS — Z79.899 HIGH RISK MEDICATION USE: ICD-10-CM

## 2018-02-19 DIAGNOSIS — E03.9 ACQUIRED HYPOTHYROIDISM: Primary | ICD-10-CM

## 2018-02-19 DIAGNOSIS — I89.0 LYMPHEDEMA: ICD-10-CM

## 2018-03-02 ENCOUNTER — RESULTS ENCOUNTER (OUTPATIENT)
Dept: FAMILY MEDICINE CLINIC | Facility: CLINIC | Age: 83
End: 2018-03-02

## 2018-03-02 DIAGNOSIS — E78.5 HYPERLIPIDEMIA, UNSPECIFIED HYPERLIPIDEMIA TYPE: ICD-10-CM

## 2018-03-02 DIAGNOSIS — IMO0002 UNCONTROLLED TYPE 2 DIABETES MELLITUS WITH OTHER CIRCULATORY COMPLICATION, WITHOUT LONG-TERM CURRENT USE OF INSULIN: Primary | ICD-10-CM

## 2018-03-02 DIAGNOSIS — Z79.899 HIGH RISK MEDICATION USE: ICD-10-CM

## 2018-03-02 DIAGNOSIS — I89.0 LYMPHEDEMA: ICD-10-CM

## 2018-03-02 DIAGNOSIS — E03.9 ACQUIRED HYPOTHYROIDISM: ICD-10-CM

## 2018-03-03 LAB
ALBUMIN SERPL-MCNC: 3.8 G/DL (ref 3.5–5.2)
ALBUMIN/CREAT UR: 6.4 MG/G CREAT (ref 0–30)
ALBUMIN/GLOB SERPL: 1.3 G/DL
ALP SERPL-CCNC: 61 U/L (ref 39–117)
ALT SERPL-CCNC: 19 U/L (ref 1–33)
AST SERPL-CCNC: 17 U/L (ref 1–32)
BASOPHILS # BLD AUTO: 0.04 10*3/MM3 (ref 0–0.2)
BASOPHILS NFR BLD AUTO: 0.3 % (ref 0–1.5)
BILIRUB SERPL-MCNC: 0.5 MG/DL (ref 0.1–1.2)
BUN SERPL-MCNC: 18 MG/DL (ref 8–23)
BUN/CREAT SERPL: 31.6 (ref 7–25)
CALCIUM SERPL-MCNC: 9.3 MG/DL (ref 8.6–10.5)
CHLORIDE SERPL-SCNC: 100 MMOL/L (ref 98–107)
CHOLEST SERPL-MCNC: 241 MG/DL (ref 0–200)
CO2 SERPL-SCNC: 30.3 MMOL/L (ref 22–29)
CREAT SERPL-MCNC: 0.57 MG/DL (ref 0.57–1)
CREAT UR-MCNC: 66.7 MG/DL
EOSINOPHIL # BLD AUTO: 0.17 10*3/MM3 (ref 0–0.7)
EOSINOPHIL NFR BLD AUTO: 1.2 % (ref 0.3–6.2)
ERYTHROCYTE [DISTWIDTH] IN BLOOD BY AUTOMATED COUNT: 15.9 % (ref 11.7–13)
GFR SERPLBLD CREATININE-BSD FMLA CKD-EPI: 102 ML/MIN/1.73
GFR SERPLBLD CREATININE-BSD FMLA CKD-EPI: 123 ML/MIN/1.73
GLOBULIN SER CALC-MCNC: 2.9 GM/DL
GLUCOSE SERPL-MCNC: 92 MG/DL (ref 65–99)
HBA1C MFR BLD: 5.8 % (ref 4.8–5.6)
HCT VFR BLD AUTO: 44.7 % (ref 35.6–45.5)
HDLC SERPL-MCNC: 81 MG/DL (ref 40–60)
HGB BLD-MCNC: 14 G/DL (ref 11.9–15.5)
IMM GRANULOCYTES # BLD: 0.07 10*3/MM3 (ref 0–0.03)
IMM GRANULOCYTES NFR BLD: 0.5 % (ref 0–0.5)
INTERPRETATION: NORMAL
LDLC SERPL CALC-MCNC: 143 MG/DL (ref 0–100)
LYMPHOCYTES # BLD AUTO: 2.81 10*3/MM3 (ref 0.9–4.8)
LYMPHOCYTES NFR BLD AUTO: 19.1 % (ref 19.6–45.3)
MCH RBC QN AUTO: 28.6 PG (ref 26.9–32)
MCHC RBC AUTO-ENTMCNC: 31.3 G/DL (ref 32.4–36.3)
MCV RBC AUTO: 91.4 FL (ref 80.5–98.2)
MICROALBUMIN UR-MCNC: 4.3 UG/ML
MONOCYTES # BLD AUTO: 2.08 10*3/MM3 (ref 0.2–1.2)
MONOCYTES NFR BLD AUTO: 14.2 % (ref 5–12)
NEUTROPHILS # BLD AUTO: 9.52 10*3/MM3 (ref 1.9–8.1)
NEUTROPHILS NFR BLD AUTO: 64.7 % (ref 42.7–76)
PLATELET # BLD AUTO: 264 10*3/MM3 (ref 140–500)
POTASSIUM SERPL-SCNC: 3.9 MMOL/L (ref 3.5–5.2)
PROT SERPL-MCNC: 6.7 G/DL (ref 6–8.5)
RBC # BLD AUTO: 4.89 10*6/MM3 (ref 3.9–5.2)
SODIUM SERPL-SCNC: 144 MMOL/L (ref 136–145)
T4 FREE SERPL-MCNC: 1.32 NG/DL (ref 0.93–1.7)
TRIGL SERPL-MCNC: 84 MG/DL (ref 0–150)
TSH SERPL DL<=0.005 MIU/L-ACNC: 5.24 MIU/ML (ref 0.27–4.2)
VLDLC SERPL CALC-MCNC: 16.8 MG/DL (ref 5–40)
WBC # BLD AUTO: 14.69 10*3/MM3 (ref 4.5–10.7)

## 2018-03-23 ENCOUNTER — OFFICE VISIT (OUTPATIENT)
Dept: FAMILY MEDICINE CLINIC | Facility: CLINIC | Age: 83
End: 2018-03-23

## 2018-03-23 VITALS
HEART RATE: 88 BPM | BODY MASS INDEX: 18.43 KG/M2 | DIASTOLIC BLOOD PRESSURE: 84 MMHG | SYSTOLIC BLOOD PRESSURE: 158 MMHG | WEIGHT: 104 LBS | TEMPERATURE: 97.6 F | RESPIRATION RATE: 16 BRPM | OXYGEN SATURATION: 96 % | HEIGHT: 63 IN

## 2018-03-23 DIAGNOSIS — E03.9 ACQUIRED HYPOTHYROIDISM: Primary | ICD-10-CM

## 2018-03-23 DIAGNOSIS — J44.9 CHRONIC OBSTRUCTIVE PULMONARY DISEASE, UNSPECIFIED COPD TYPE (HCC): ICD-10-CM

## 2018-03-23 DIAGNOSIS — M81.0 AGE-RELATED OSTEOPOROSIS WITHOUT CURRENT PATHOLOGICAL FRACTURE: ICD-10-CM

## 2018-03-23 DIAGNOSIS — D72.829 LEUKOCYTOSIS, UNSPECIFIED TYPE: ICD-10-CM

## 2018-03-23 DIAGNOSIS — E27.40 CHRONIC ADRENAL INSUFFICIENCY (HCC): ICD-10-CM

## 2018-03-23 PROCEDURE — 99214 OFFICE O/P EST MOD 30 MIN: CPT | Performed by: FAMILY MEDICINE

## 2018-03-23 NOTE — PROGRESS NOTES
[facility]  Gely Carlisle is a 82 y.o. female who is here for follow up multiple medical problems.  Over the last month patient has had significant pulmonary infections especially related to her bronchiectasis etc.  Had multiple lab work done earlier this month and has questions regarding results most of which were unremarkable.  White blood cell count elevated probably related to infection and also possibly related to prednisone therapy etc.  Was seen by endocrinologist and started on losartan because of questionable protein in the urine.  Urine test was done at this office and basically found to be unremarkable.  Patient never started losartan and she says her blood pressures have been running okay at home.  All of this was discussed with the patient and her daughter.  Also noted slightly elevated TSH level but patient admits to missing a few doses of her thyroid supplement.      Review of Systems   HENT: Positive for congestion and rhinorrhea.    Respiratory: Positive for cough, shortness of breath and wheezing.    Cardiovascular: Positive for leg swelling.   Genitourinary: Positive for frequency.   Musculoskeletal: Positive for back pain.   Allergic/Immunologic: Positive for immunocompromised state.   All other systems reviewed and are negative.        Past Medical History:   Diagnosis Date   • Asthma    • Bronchiectasis    • Cancer     cervical and skin   • Colitis    • COPD (chronic obstructive pulmonary disease)    • Diabetes mellitus     hyperglycemia   • Disease of thyroid gland     hypothyroidism   • History of transfusion    • Hyperlipidemia    • Insufficiency, adrenal    • Leukocytosis    • Lymphedema    • MR (mitral regurgitation)    • MVP (mitral valve prolapse)    • Osteoarthritis    • Osteoporosis    • Osteoporosis    • Scoliosis    • Urinary tract infection        Past Surgical History:   Procedure Laterality Date   • CATARACT EXTRACTION     • CHOLECYSTECTOMY      WITH COMMON BILE DUCT EXPLORATION  REMOVAL OF STONE   • COLON SURGERY  1983    RUPTURED DIVERTICULI  DYLAN WORLEY MD   • HERNIA REPAIR  2012    ABDOMINAL LEFT UPPER   • HYSTERECTOMY     • HYSTERECTOMY     • JOINT REPLACEMENT Right     hip  2002 DR BROWN   • LUNG LOBECTOMY Left 1955    BRONCHIECTESIS LOWER LOBE   • TONSILLECTOMY      with adennoidectomy       Family History   Problem Relation Age of Onset   • Heart disease Mother    • Heart attack Mother    • Cancer Sister      BREAST   • No Known Problems Father    • Heart attack Maternal Grandmother    • Heart disease Maternal Grandmother    • Stroke Maternal Grandfather    • No Known Problems Paternal Grandmother    • No Known Problems Paternal Grandfather        Social History     Social History   • Marital status:      Spouse name: N/A   • Number of children: N/A   • Years of education: N/A     Occupational History   • Not on file.     Social History Main Topics   • Smoking status: Passive Smoke Exposure - Never Smoker   • Smokeless tobacco: Not on file   • Alcohol use No   • Drug use: No   • Sexual activity: No     Other Topics Concern   • Not on file     Social History Narrative   • No narrative on file         Physical Exam   Constitutional: She is oriented to person, place, and time. She appears well-developed and well-nourished.   HENT:   Head: Normocephalic.   Eyes: EOM are normal. Pupils are equal, round, and reactive to light.   Neck: Normal range of motion. Neck supple.   Cardiovascular: Normal rate.    Pulmonary/Chest: Effort normal. No respiratory distress.   Lymphadenopathy:     She has no cervical adenopathy.   Neurological: She is alert and oriented to person, place, and time. Coordination normal.   Skin: Skin is warm and dry.   Psychiatric: She has a normal mood and affect. Her behavior is normal. Judgment and thought content normal.   Vitals reviewed.        Assessment/Plan    Gely was seen today for follow-up, labs only, copd and hypothyroidism.    Diagnoses and all  orders for this visit:    Acquired hypothyroidism    Chronic obstructive pulmonary disease, unspecified COPD type    Leukocytosis, unspecified type    Chronic adrenal insufficiency    Age-related osteoporosis without current pathological fracture    Other orders  -     CBC & Differential; Future          Patient here for routine follow-up of multiple medical problems some of which are noted above.  Recently has been having ongoing problem with lung infection and had several visits to pulmonary specialist including antibiotic therapy and prednisone etc.  Discussed recent lab work all of which was unremarkable except for elevated white blood cell count.  Also discussed ARB therapy apparently ordered by endocrinologist because of some small amount of protein in the urine.  Discussed holding therapy and monitoring blood pressure etc.  Will recheck white blood cell count next week because of elevation.  Otherwise continue current therapy and follow-up in 6 months.  Spent 30 minutes discussing lab results and other issues with patient and her daughter.    This note includes information entered using a voice recognition dictation system.  Though reviewed, some nonsensible errors may remain.

## 2018-03-27 ENCOUNTER — RESULTS ENCOUNTER (OUTPATIENT)
Dept: FAMILY MEDICINE CLINIC | Facility: CLINIC | Age: 83
End: 2018-03-27

## 2018-03-27 DIAGNOSIS — D72.829 LEUKOCYTOSIS, UNSPECIFIED TYPE: ICD-10-CM

## 2018-04-04 LAB
BASOPHILS # BLD AUTO: 0.03 10*3/MM3 (ref 0–0.2)
BASOPHILS NFR BLD AUTO: 0.3 % (ref 0–1.5)
EOSINOPHIL # BLD AUTO: 0.15 10*3/MM3 (ref 0–0.7)
EOSINOPHIL NFR BLD AUTO: 1.5 % (ref 0.3–6.2)
ERYTHROCYTE [DISTWIDTH] IN BLOOD BY AUTOMATED COUNT: 15.9 % (ref 11.7–13)
HCT VFR BLD AUTO: 44.3 % (ref 35.6–45.5)
HGB BLD-MCNC: 13.6 G/DL (ref 11.9–15.5)
IMM GRANULOCYTES # BLD: 0.08 10*3/MM3 (ref 0–0.03)
IMM GRANULOCYTES NFR BLD: 0.8 % (ref 0–0.5)
LYMPHOCYTES # BLD AUTO: 3.02 10*3/MM3 (ref 0.9–4.8)
LYMPHOCYTES NFR BLD AUTO: 30.4 % (ref 19.6–45.3)
MCH RBC QN AUTO: 28.2 PG (ref 26.9–32)
MCHC RBC AUTO-ENTMCNC: 30.7 G/DL (ref 32.4–36.3)
MCV RBC AUTO: 91.7 FL (ref 80.5–98.2)
MONOCYTES # BLD AUTO: 1.11 10*3/MM3 (ref 0.2–1.2)
MONOCYTES NFR BLD AUTO: 11.2 % (ref 5–12)
NEUTROPHILS # BLD AUTO: 5.53 10*3/MM3 (ref 1.9–8.1)
NEUTROPHILS NFR BLD AUTO: 55.8 % (ref 42.7–76)
PLATELET # BLD AUTO: 294 10*3/MM3 (ref 140–500)
RBC # BLD AUTO: 4.83 10*6/MM3 (ref 3.9–5.2)
WBC # BLD AUTO: 9.92 10*3/MM3 (ref 4.5–10.7)

## 2018-05-16 ENCOUNTER — OFFICE VISIT (OUTPATIENT)
Dept: RETAIL CLINIC | Facility: CLINIC | Age: 83
End: 2018-05-16

## 2018-05-16 VITALS
RESPIRATION RATE: 16 BRPM | DIASTOLIC BLOOD PRESSURE: 70 MMHG | HEART RATE: 85 BPM | SYSTOLIC BLOOD PRESSURE: 122 MMHG | OXYGEN SATURATION: 95 % | TEMPERATURE: 98.6 F

## 2018-05-16 DIAGNOSIS — S50.02XA TRAUMATIC HEMATOMA OF ELBOW, LEFT, INITIAL ENCOUNTER: Primary | ICD-10-CM

## 2018-05-16 PROCEDURE — 99213 OFFICE O/P EST LOW 20 MIN: CPT | Performed by: NURSE PRACTITIONER

## 2018-05-16 RX ORDER — DOXYCYCLINE HYCLATE 100 MG
100 TABLET ORAL 2 TIMES DAILY
Qty: 20 TABLET | Refills: 0 | Status: SHIPPED | OUTPATIENT
Start: 2018-05-16 | End: 2018-05-26

## 2018-05-16 NOTE — PROGRESS NOTES
"    Gely Carlisle is a 82 y.o. female.     Patient reports that she pushed her left elbow against the countertop to push herself up to standing on Sunday and since has felt pain and has developed bruising and swelling over left elbow.  Pain is worsened when she bears weight on extremity. She has not tried any treatment at home and has been unable to rest the elbow because she uses her elbows to push up to standing position because her knees \"are so bad.\"       Elbow Pain   This is a new problem. The current episode started in the past 7 days. The problem occurs intermittently. The problem has been unchanged. Associated symptoms include arthralgias (history of severe arthritis), joint swelling (left elbow) and weakness (nothing above baseline). Pertinent negatives include no chills or fever. Exacerbated by: bearing weight. She has tried acetaminophen for the symptoms. The treatment provided mild relief.        The following portions of the patient's history were reviewed and updated as appropriate: allergies, current medications, past family history, past medical history, past social history, past surgical history and problem list.    Review of Systems   Constitutional: Negative for chills and fever.   Musculoskeletal: Positive for arthralgias (history of severe arthritis) and joint swelling (left elbow).   Neurological: Positive for weakness (nothing above baseline).   Hematological: Bruises/bleeds easily.           Physical Exam   Constitutional: She appears well-developed and well-nourished. No distress.   HENT:   Head: Normocephalic and atraumatic.   Eyes: EOM are normal. Pupils are equal, round, and reactive to light.   Neck: Normal range of motion.   Cardiovascular: Normal rate, regular rhythm and normal heart sounds.    Pulmonary/Chest: Effort normal. No respiratory distress. She has wheezes.   Musculoskeletal: She exhibits deformity.        Left elbow: She exhibits swelling (palpable hematoma left " medial elbow with overlying bruising) and laceration (2 mm scab with minimal erythema surrounding, no drainage, no streaking). She exhibits normal range of motion, no effusion and no deformity. Tenderness found. No radial head, no medial epicondyle, no lateral epicondyle and no olecranon process tenderness noted.        Right hand: She exhibits decreased range of motion and deformity (contractures secondary to arthritis).        Left hand: She exhibits decreased range of motion and deformity (contracted secondary to arthritis).           Diagnoses and all orders for this visit:    Traumatic hematoma of elbow, left, initial encounter    Other orders  -     doxycycline (VIBRAMYICN) 100 MG tablet; Take 1 tablet by mouth 2 (Two) Times a Day for 10 days.    Discussed that elbow presented more as an injury with a hematoma than any infection as there is no erythema, warmth to touch or streaking but prescribed an antibiotic to prevent infection because of history of cellulitis, immunocompromised immune system and small opening in skin.  Wrapped elbow in ace wrap and instructed patient to rest as much as possible, ice and elevate.  She has an appointment in am with her PCP Dr. Paredes and has agreed to have him follow up on this injury.  Elbow Contusion  An elbow contusion is a deep bruise of the elbow. Contusions are the result of a blunt injury to tissues and muscle fibers under the skin. The injury causes bleeding under the skin. The skin overlying the contusion may turn blue, purple, or yellow. Minor injuries will give you a painless contusion, but more severe contusions may stay painful and swollen for a few weeks.  What are the causes?  This condition is usually caused by a hard hit, trauma, or direct force on the elbow.  What are the signs or symptoms?  Symptoms of this condition include:  · Swelling of the elbow.  · Pain and tenderness of the elbow.  · Discoloration of the elbow. The area may have redness and then  turn blue, purple, or yellow.  How is this diagnosed?  This condition is diagnosed from a physical exam and your medical history. An X-ray may be needed to determine if there are any associated injuries, such as broken bones (fractures).  How is this treated?  A sling or splint may be needed to support your injury. In general, the best treatment for this condition includes rest, ice, pressure (compression), and elevation. This is often called RICE therapy.  Over-the-counter anti-inflammatory medicines may also be recommended for pain control. You may also be shown how to do range-of-motion exercises.  Follow these instructions at home:  RICE Therapy   · Rest the injured area.  · If directed, apply ice to the injured area:  ¨ Put ice in a plastic bag.  ¨ Place a towel between your skin and the bag.  ¨ Leave the ice on for 20 minutes, 2-3 times per day.  · If directed, apply light compression to the injured area using an elastic bandage. Make sure the bandage is not wrapped too tightly. Remove and reapply the bandage as directed by your health care provider.  · Raise (elevate) the injured area above the level of your heart while you are sitting or lying down.  If you have a splint:   · Wear the splint as told by your health care provider. Remove it only as told by your health care provider.  · Loosen the splint if your fingers tingle, become numb, or turn cold and blue.  · Do not let your splint get wet if it is not waterproof.  · If your splint is not waterproof, cover it with a watertight plastic bag when you take a bath or a shower.  · Keep the splint clean.  General instructions   · Wear your sling as told by your health care provider, if this applies.  · Use your elbow only as told by your health care provider. You may be asked to do range-of-motion exercises. Do them as told.  · Take over-the-counter and prescription medicines only as told by your health care provider.  · Keep all follow-up visits as told by  your health care provider. This is important.  Contact a health care provider if:  · Your symptoms do not improve after several days of treatment.  · You have more redness, swelling, or pain in your elbow.  · You have difficulty moving the injured area.  · Your swelling or pain is not relieved with medicines.  Get help right away if:  · You have severe pain.  · You have numbness in your hand or fingers.  · Your hand or fingers turn pale or cold.  · You have swelling of your hand and fingers.  · You cannot move your fingers or wrist.  This information is not intended to replace advice given to you by your health care provider. Make sure you discuss any questions you have with your health care provider.  Document Released: 11/26/2007 Document Revised: 05/25/2017 Document Reviewed: 08/01/2016  [employer] Interactive Patient Education © 2017 [employer]    Take antibiotic as prescribed to prevent infection.  Follow up with Dr. Paredes in am.

## 2018-05-16 NOTE — PATIENT INSTRUCTIONS
Elbow Contusion  An elbow contusion is a deep bruise of the elbow. Contusions are the result of a blunt injury to tissues and muscle fibers under the skin. The injury causes bleeding under the skin. The skin overlying the contusion may turn blue, purple, or yellow. Minor injuries will give you a painless contusion, but more severe contusions may stay painful and swollen for a few weeks.  What are the causes?  This condition is usually caused by a hard hit, trauma, or direct force on the elbow.  What are the signs or symptoms?  Symptoms of this condition include:  · Swelling of the elbow.  · Pain and tenderness of the elbow.  · Discoloration of the elbow. The area may have redness and then turn blue, purple, or yellow.  How is this diagnosed?  This condition is diagnosed from a physical exam and your medical history. An X-ray may be needed to determine if there are any associated injuries, such as broken bones (fractures).  How is this treated?  A sling or splint may be needed to support your injury. In general, the best treatment for this condition includes rest, ice, pressure (compression), and elevation. This is often called RICE therapy.  Over-the-counter anti-inflammatory medicines may also be recommended for pain control. You may also be shown how to do range-of-motion exercises.  Follow these instructions at home:  RICE Therapy   · Rest the injured area.  · If directed, apply ice to the injured area:  ¨ Put ice in a plastic bag.  ¨ Place a towel between your skin and the bag.  ¨ Leave the ice on for 20 minutes, 2-3 times per day.  · If directed, apply light compression to the injured area using an elastic bandage. Make sure the bandage is not wrapped too tightly. Remove and reapply the bandage as directed by your health care provider.  · Raise (elevate) the injured area above the level of your heart while you are sitting or lying down.  If you have a splint:   · Wear the splint as told by your health care  provider. Remove it only as told by your health care provider.  · Loosen the splint if your fingers tingle, become numb, or turn cold and blue.  · Do not let your splint get wet if it is not waterproof.  · If your splint is not waterproof, cover it with a watertight plastic bag when you take a bath or a shower.  · Keep the splint clean.  General instructions   · Wear your sling as told by your health care provider, if this applies.  · Use your elbow only as told by your health care provider. You may be asked to do range-of-motion exercises. Do them as told.  · Take over-the-counter and prescription medicines only as told by your health care provider.  · Keep all follow-up visits as told by your health care provider. This is important.  Contact a health care provider if:  · Your symptoms do not improve after several days of treatment.  · You have more redness, swelling, or pain in your elbow.  · You have difficulty moving the injured area.  · Your swelling or pain is not relieved with medicines.  Get help right away if:  · You have severe pain.  · You have numbness in your hand or fingers.  · Your hand or fingers turn pale or cold.  · You have swelling of your hand and fingers.  · You cannot move your fingers or wrist.  This information is not intended to replace advice given to you by your health care provider. Make sure you discuss any questions you have with your health care provider.  Document Released: 11/26/2007 Document Revised: 05/25/2017 Document Reviewed: 08/01/2016  [employer] Patient Education © 2017 [employer]    Take antibiotic as prescribed to prevent infection.  Follow up with Dr. Paredes in am.

## 2018-05-17 ENCOUNTER — HOSPITAL ENCOUNTER (OUTPATIENT)
Dept: GENERAL RADIOLOGY | Facility: HOSPITAL | Age: 83
Discharge: HOME | End: 2018-05-17
Attending: FAMILY MEDICINE | Admitting: FAMILY MEDICINE

## 2018-05-17 ENCOUNTER — OFFICE VISIT (OUTPATIENT)
Dept: FAMILY MEDICINE CLINIC | Facility: CLINIC | Age: 83
End: 2018-05-17

## 2018-05-17 VITALS
SYSTOLIC BLOOD PRESSURE: 102 MMHG | RESPIRATION RATE: 17 BRPM | BODY MASS INDEX: 18.43 KG/M2 | OXYGEN SATURATION: 95 % | HEART RATE: 94 BPM | TEMPERATURE: 98 F | WEIGHT: 104 LBS | DIASTOLIC BLOOD PRESSURE: 60 MMHG | HEIGHT: 63 IN

## 2018-05-17 DIAGNOSIS — M25.522 LEFT ELBOW PAIN: Primary | ICD-10-CM

## 2018-05-17 DIAGNOSIS — M25.522 LEFT ELBOW PAIN: ICD-10-CM

## 2018-05-17 LAB
BASOPHILS # BLD AUTO: 0.02 10*3/MM3 (ref 0–0.2)
BASOPHILS NFR BLD AUTO: 0.2 % (ref 0–1.5)
EOSINOPHIL # BLD AUTO: 0.11 10*3/MM3 (ref 0–0.7)
EOSINOPHIL NFR BLD AUTO: 0.9 % (ref 0.3–6.2)
ERYTHROCYTE [DISTWIDTH] IN BLOOD BY AUTOMATED COUNT: 16 % (ref 11.7–13)
ERYTHROCYTE [SEDIMENTATION RATE] IN BLOOD BY WESTERGREN METHOD: 9 MM/HR (ref 0–30)
HCT VFR BLD AUTO: 43.3 % (ref 35.6–45.5)
HGB BLD-MCNC: 13.1 G/DL (ref 11.9–15.5)
IMM GRANULOCYTES # BLD: 0.07 10*3/MM3 (ref 0–0.03)
IMM GRANULOCYTES NFR BLD: 0.5 % (ref 0–0.5)
LYMPHOCYTES # BLD AUTO: 3.17 10*3/MM3 (ref 0.9–4.8)
LYMPHOCYTES NFR BLD AUTO: 24.6 % (ref 19.6–45.3)
MCH RBC QN AUTO: 27.9 PG (ref 26.9–32)
MCHC RBC AUTO-ENTMCNC: 30.3 G/DL (ref 32.4–36.3)
MCV RBC AUTO: 92.3 FL (ref 80.5–98.2)
MONOCYTES # BLD AUTO: 1.65 10*3/MM3 (ref 0.2–1.2)
MONOCYTES NFR BLD AUTO: 12.8 % (ref 5–12)
NEUTROPHILS # BLD AUTO: 7.86 10*3/MM3 (ref 1.9–8.1)
NEUTROPHILS NFR BLD AUTO: 61 % (ref 42.7–76)
PLATELET # BLD AUTO: 275 10*3/MM3 (ref 140–500)
RBC # BLD AUTO: 4.69 10*6/MM3 (ref 3.9–5.2)
WBC # BLD AUTO: 12.88 10*3/MM3 (ref 4.5–10.7)

## 2018-05-17 PROCEDURE — 99213 OFFICE O/P EST LOW 20 MIN: CPT | Performed by: FAMILY MEDICINE

## 2018-05-17 PROCEDURE — 73070 X-RAY EXAM OF ELBOW: CPT

## 2018-05-17 RX ORDER — METHYLPREDNISOLONE 4 MG/1
TABLET ORAL
Qty: 21 TABLET | Refills: 0 | Status: SHIPPED | OUTPATIENT
Start: 2018-05-17 | End: 2018-05-30

## 2018-05-17 NOTE — PROGRESS NOTES
HPI  Gely Carlisle is a 82 y.o. female who is here for follow up of a painful swollen left elbow.  Patient says this started after leaning on her left elbow but no real significant injury.  Patient went to an immediate care center and was evaluated and wrapped with an Ace bandage and told to keep appointment at this office.  No known injury but patient does have osteoporosis and apparently is on chronic Medrol therapy for renal insufficiency?  Also given antibiotic but did not start.  Some bruising noted and patient complains of significant pain.      Review of Systems   Constitutional: Negative for chills, diaphoresis and fever.   Musculoskeletal: Positive for arthralgias.   All other systems reviewed and are negative.        Past Medical History:   Diagnosis Date   • Asthma    • Bronchiectasis    • Cancer     cervical and skin   • Colitis    • COPD (chronic obstructive pulmonary disease)    • Diabetes mellitus     hyperglycemia   • Disease of thyroid gland     hypothyroidism   • History of transfusion    • Hyperlipidemia    • Insufficiency, adrenal    • Leukocytosis    • Lymphedema    • MR (mitral regurgitation)    • MVP (mitral valve prolapse)    • Osteoarthritis    • Osteoporosis    • Osteoporosis    • Scoliosis    • Urinary tract infection        Past Surgical History:   Procedure Laterality Date   • CATARACT EXTRACTION     • CHOLECYSTECTOMY      WITH COMMON BILE DUCT EXPLORATION REMOVAL OF STONE   • COLON SURGERY  1983    RUPTURED DIVERTICULI  DYLAN WORLEY MD   • HERNIA REPAIR  2012    ABDOMINAL LEFT UPPER   • HYSTERECTOMY     • HYSTERECTOMY     • JOINT REPLACEMENT Right     hip  2002 DR BROWN   • LUNG LOBECTOMY Left 1955    BRONCHIECTESIS LOWER LOBE   • TONSILLECTOMY      with adennoidectomy       Family History   Problem Relation Age of Onset   • Heart disease Mother    • Heart attack Mother    • Cancer Sister         BREAST   • No Known Problems Father    • Heart attack Maternal Grandmother    •  Heart disease Maternal Grandmother    • Stroke Maternal Grandfather    • No Known Problems Paternal Grandmother    • No Known Problems Paternal Grandfather        Social History     Social History   • Marital status:      Spouse name: N/A   • Number of children: N/A   • Years of education: N/A     Occupational History   • Not on file.     Social History Main Topics   • Smoking status: Passive Smoke Exposure - Never Smoker   • Smokeless tobacco: Not on file   • Alcohol use No   • Drug use: No   • Sexual activity: No     Other Topics Concern   • Not on file     Social History Narrative   • No narrative on file         Physical Exam   Constitutional: She appears well-developed and well-nourished. No distress.   Musculoskeletal:        Left elbow: She exhibits swelling and deformity. She exhibits no effusion and no laceration. Tenderness found.        Arms:  Skin: Skin is warm and dry.   Psychiatric: She has a normal mood and affect. Her behavior is normal. Judgment and thought content normal.   Nursing note and vitals reviewed.        Assessment/Plan    Gely was seen today for joint swelling.    Diagnoses and all orders for this visit:    Left elbow pain  -     CBC & Differential  -     Sedimentation Rate  -     XR elbow 2 vw left; Future  -     MethylPREDNISolone (MEDROL) 4 MG tablet; follow package directions        Patient presents with a painful swollen left elbow.  Started after very minor trauma?  Questionable hematoma on the medial elbow but do not appreciate any true joint effusion and do not see evidence of infection.  Will get blood test and x-ray as noted above and temporary increase in Medrol as discussed.  Do not recommend taking antibiotic.  Recommend discontinuing ace wrap and using a sling only.    This note includes information entered using a voice recognition dictation system.  Though reviewed, some nonsensible errors may remain.

## 2018-05-30 ENCOUNTER — OFFICE VISIT (OUTPATIENT)
Dept: CARDIOLOGY | Facility: CLINIC | Age: 83
End: 2018-05-30

## 2018-05-30 VITALS
WEIGHT: 103 LBS | SYSTOLIC BLOOD PRESSURE: 108 MMHG | HEART RATE: 80 BPM | HEIGHT: 60 IN | DIASTOLIC BLOOD PRESSURE: 64 MMHG | BODY MASS INDEX: 20.22 KG/M2

## 2018-05-30 DIAGNOSIS — I34.0 MITRAL VALVE INSUFFICIENCY, UNSPECIFIED ETIOLOGY: Primary | ICD-10-CM

## 2018-05-30 PROCEDURE — 99213 OFFICE O/P EST LOW 20 MIN: CPT | Performed by: INTERNAL MEDICINE

## 2018-05-30 PROCEDURE — 93000 ELECTROCARDIOGRAM COMPLETE: CPT | Performed by: INTERNAL MEDICINE

## 2019-04-03 ENCOUNTER — OFFICE VISIT (OUTPATIENT)
Dept: FAMILY MEDICINE CLINIC | Facility: CLINIC | Age: 84
End: 2019-04-03

## 2019-04-03 VITALS
HEIGHT: 60 IN | OXYGEN SATURATION: 95 % | WEIGHT: 95 LBS | SYSTOLIC BLOOD PRESSURE: 120 MMHG | HEART RATE: 91 BPM | TEMPERATURE: 97.6 F | BODY MASS INDEX: 18.65 KG/M2 | DIASTOLIC BLOOD PRESSURE: 70 MMHG

## 2019-04-03 DIAGNOSIS — R73.9 HYPERGLYCEMIA: ICD-10-CM

## 2019-04-03 DIAGNOSIS — B37.0 THRUSH, ORAL: ICD-10-CM

## 2019-04-03 DIAGNOSIS — M1A.9XX1 TOPHI: Primary | ICD-10-CM

## 2019-04-03 DIAGNOSIS — J47.9 BRONCHIECTASIS WITHOUT COMPLICATION (HCC): ICD-10-CM

## 2019-04-03 LAB
BUN SERPL-MCNC: 19 MG/DL (ref 8–23)
BUN/CREAT SERPL: 35.8 (ref 7–25)
CALCIUM SERPL-MCNC: 9.7 MG/DL (ref 8.6–10.5)
CHLORIDE SERPL-SCNC: 98 MMOL/L (ref 98–107)
CO2 SERPL-SCNC: 30 MMOL/L (ref 22–29)
CREAT SERPL-MCNC: 0.53 MG/DL (ref 0.57–1)
GLUCOSE SERPL-MCNC: 76 MG/DL (ref 65–99)
HBA1C MFR BLD: 5.8 % (ref 4.8–5.6)
POTASSIUM SERPL-SCNC: 3.5 MMOL/L (ref 3.5–5.2)
SODIUM SERPL-SCNC: 141 MMOL/L (ref 136–145)
URATE SERPL-MCNC: 4.9 MG/DL (ref 2.4–5.7)

## 2019-04-03 PROCEDURE — 99214 OFFICE O/P EST MOD 30 MIN: CPT | Performed by: FAMILY MEDICINE

## 2019-04-03 NOTE — PROGRESS NOTES
MONSERRAT  Gely Carlisle is a 83 y.o. female who is here for follow up of multiple questions.  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  Lesion on right forearm previously said to be a cyst.  Dermatologist told patient this basically was full of gout crystals.  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  Of interest patient had acute knee pain over 1 year ago and was hospitalized.  Had knee aspiration which actually showed pseudogout crystals.  Patient is on chronic prednisone therapy for bronchiectasis.  Followed by endocrinologist every 3-6 months with blood sugar and thyroid levels checked.  Complains of thrush and requesting refill of nystatin suspension.  Scheduled for dental cleaning tomorrow and most likely will delay until improved.      Review of Systems   Constitutional: Positive for unexpected weight change.   HENT: Positive for mouth sores.    Respiratory: Negative for cough and shortness of breath.         Wearing mask to protect from viral illnesses etc.   Musculoskeletal: Positive for arthralgias.   Skin:        Skin lesion on right forearm which is consistent with a tophi   All other systems reviewed and are negative.        Past Medical History:   Diagnosis Date   • Asthma    • Bronchiectasis (CMS/HCC)    • Cancer (CMS/HCC)     cervical and skin   • Colitis    • COPD (chronic obstructive pulmonary disease) (CMS/HCC)    • Diabetes mellitus (CMS/HCC)     hyperglycemia   • Disease of thyroid gland     hypothyroidism   • History of transfusion    • Hyperlipidemia    • Insufficiency, adrenal (CMS/HCC)    • Leukocytosis    • Lymphedema    • MR (mitral regurgitation)    • MVP (mitral valve prolapse)    • Osteoarthritis    • Osteoporosis    • Osteoporosis    • Scoliosis    • Urinary tract infection        Past  Surgical History:   Procedure Laterality Date   • CATARACT EXTRACTION     • CHOLECYSTECTOMY      WITH COMMON BILE DUCT EXPLORATION REMOVAL OF STONE   • COLON SURGERY  1983    RUPTURED DIVERTICULI  DYLAN WORLEY MD   • HERNIA REPAIR  2012    ABDOMINAL LEFT UPPER   • HYSTERECTOMY     • HYSTERECTOMY     • JOINT REPLACEMENT Right     hip  2002 DR BROWN   • LUNG LOBECTOMY Left 1955    BRONCHIECTESIS LOWER LOBE   • TONSILLECTOMY      with adennoidectomy       Family History   Problem Relation Age of Onset   • Heart disease Mother    • Heart attack Mother    • Cancer Sister         BREAST   • No Known Problems Father    • Heart attack Maternal Grandmother    • Heart disease Maternal Grandmother    • Stroke Maternal Grandfather    • No Known Problems Paternal Grandmother    • No Known Problems Paternal Grandfather        Social History     Socioeconomic History   • Marital status:      Spouse name: Not on file   • Number of children: Not on file   • Years of education: Not on file   • Highest education level: Not on file   Tobacco Use   • Smoking status: Passive Smoke Exposure - Never Smoker   Substance and Sexual Activity   • Alcohol use: No   • Drug use: No   • Sexual activity: No         Physical Exam   Constitutional: She appears well-developed. No distress.   HENT:   Mouth/Throat: No posterior oropharyngeal erythema.       Eyes: Conjunctivae are normal. Pupils are equal, round, and reactive to light.   Neck: Normal range of motion.   Cardiovascular: Normal rate and regular rhythm.   Pulmonary/Chest: Effort normal.   Abdominal: Soft. She exhibits no distension.   Musculoskeletal: She exhibits deformity. She exhibits no edema.   Ambulates with a cane   Lymphadenopathy:     She has no cervical adenopathy.   Skin: Skin is warm and dry.   Recently treated for skin cancers by dermatologist.  Senile skin changes with thinning consistent with chronic steroid therapy   Psychiatric: She has a normal mood and affect. Her  behavior is normal. Judgment and thought content normal.   Nursing note and vitals reviewed.        Assessment/Plan    Gely was seen today for gout.    Diagnoses and all orders for this visit:    Tophi  -     Basic Metabolic Panel  -     Uric Acid    Hyperglycemia  -     Basic Metabolic Panel  -     Hemoglobin A1c    Bronchiectasis without complication (CMS/HCC)    Thrush, oral  -     nystatin (MYCOSTATIN) 987987 UNIT/ML suspension; Swish and swallow 5 mL 4 (Four) Times a Day.        Patient here with multiple questions especially regarding lesion on forearm.  Also several other questions which are redressed.  Will get routine lab as noted above and send copy to endocrinologist.  Remains somewhat cachectic related to chronic illnesses.  Will check uric acid level because of probable tophi but most likely will not recommend treatment since is asymptomatic.  Of note had arthritis in the and aspiration showed pseudogout crystals.    This note includes information entered using a voice recognition dictation system.  Though reviewed, some nonsensible errors may remain.

## 2019-06-05 ENCOUNTER — OFFICE VISIT (OUTPATIENT)
Dept: CARDIOLOGY | Facility: CLINIC | Age: 84
End: 2019-06-05

## 2019-06-05 VITALS
SYSTOLIC BLOOD PRESSURE: 124 MMHG | BODY MASS INDEX: 19.04 KG/M2 | DIASTOLIC BLOOD PRESSURE: 70 MMHG | HEIGHT: 60 IN | HEART RATE: 81 BPM | WEIGHT: 97 LBS

## 2019-06-05 DIAGNOSIS — I34.0 MITRAL VALVE INSUFFICIENCY, UNSPECIFIED ETIOLOGY: Primary | ICD-10-CM

## 2019-06-05 PROCEDURE — 93000 ELECTROCARDIOGRAM COMPLETE: CPT | Performed by: INTERNAL MEDICINE

## 2019-06-05 PROCEDURE — 99213 OFFICE O/P EST LOW 20 MIN: CPT | Performed by: INTERNAL MEDICINE

## 2019-06-05 RX ORDER — ROSUVASTATIN CALCIUM 20 MG/1
TABLET, COATED ORAL DAILY
Refills: 6 | COMMUNITY
Start: 2019-04-02 | End: 2022-01-01 | Stop reason: ALTCHOICE

## 2019-06-05 NOTE — PROGRESS NOTES
Subjective:     Encounter Date:06/05/2019      Patient ID: Gely Carlisle is a 83 y.o. female.    Chief Complaint: MR    History of Present Illness    Dear Dr. Paredes:    I had the pleasure of seeing your patient in cardiac followup today. As you well know, she is a samantha 83-year-old woman with history of mitral valve prolapse and mitral regurgitation. She had bronchiectasis with chronic lung infections. She has adrenal insufficiency due to chronic steroid use.    She has longstanding lymphedema. She has chronic dyspnea due to her respiratory issue. With her chronic steroid use and other conditions, we thought she would not be a good mitral valve surgery candidate.    She comes in for her yearly followup. Since she was last seen, she reports no symptoms of heart failure. She does have dyspnea which she attributes to COPD and asthma.        Review of Systems   All other systems reviewed and are negative.        ECG 12 Lead  Date/Time: 6/5/2019 11:59 AM  Performed by: Elpidio Montemayor MD  Authorized by: Elpidio Montemayor MD   Comparison: compared with previous ECG   Similar to previous ECG  Rhythm: sinus rhythm  BPM: 81  Conduction: left anterior fascicular block  Other findings: left ventricular hypertrophy               Objective:     Physical Exam   Constitutional: She is oriented to person, place, and time. She appears well-developed and well-nourished.   HENT:   Head: Normocephalic and atraumatic.   Neck: Normal range of motion. Neck supple.   Cardiovascular: Normal rate, regular rhythm and normal heart sounds.   Chronic LE edema   Pulmonary/Chest: Effort normal and breath sounds normal.   Abdominal: Soft. Bowel sounds are normal.   Musculoskeletal: Normal range of motion.   Neurological: She is alert and oriented to person, place, and time.   Skin: Skin is warm and dry.   Psychiatric: She has a normal mood and affect. Her behavior is normal. Thought content normal.   Vitals reviewed.      Lab Review:        Assessment:          Diagnosis Plan   1. Mitral valve insufficiency, unspecified etiology  ECG 12 Lead          Plan:       It was a pleasure to see your patient in cardiac followup today. She is doing very well from the cardiac standpoint without any complaints. She has chronic dyspnea due to chronic bronchiectasis. At this point, I would recommend that we continue to follow her. I have recommended no changes at this time. She will see us again in 1 year or sooner if symptoms warrant.

## 2019-09-16 ENCOUNTER — OFFICE VISIT (OUTPATIENT)
Dept: FAMILY MEDICINE CLINIC | Facility: CLINIC | Age: 84
End: 2019-09-16

## 2019-09-16 VITALS
OXYGEN SATURATION: 98 % | TEMPERATURE: 98 F | BODY MASS INDEX: 19.32 KG/M2 | WEIGHT: 98.4 LBS | HEIGHT: 60 IN | RESPIRATION RATE: 16 BRPM | SYSTOLIC BLOOD PRESSURE: 120 MMHG | HEART RATE: 62 BPM | DIASTOLIC BLOOD PRESSURE: 80 MMHG

## 2019-09-16 DIAGNOSIS — J44.9 CHRONIC OBSTRUCTIVE PULMONARY DISEASE, UNSPECIFIED COPD TYPE (HCC): ICD-10-CM

## 2019-09-16 DIAGNOSIS — M41.9 ACQUIRED KYPHOSCOLIOSIS: ICD-10-CM

## 2019-09-16 DIAGNOSIS — Z79.899 HIGH RISK MEDICATION USE: Primary | ICD-10-CM

## 2019-09-16 DIAGNOSIS — E11.59 CONTROLLED TYPE 2 DIABETES MELLITUS WITH OTHER CIRCULATORY COMPLICATION, WITHOUT LONG-TERM CURRENT USE OF INSULIN (HCC): ICD-10-CM

## 2019-09-16 PROCEDURE — 99213 OFFICE O/P EST LOW 20 MIN: CPT | Performed by: FAMILY MEDICINE

## 2019-09-16 RX ORDER — HYDROCODONE BITARTRATE AND ACETAMINOPHEN 5; 325 MG/1; MG/1
1 TABLET ORAL EVERY 4 HOURS PRN
Qty: 30 TABLET | Refills: 0 | Status: SHIPPED | OUTPATIENT
Start: 2019-09-16 | End: 2020-02-24 | Stop reason: SDUPTHER

## 2019-09-16 RX ORDER — METHYLPREDNISOLONE 4 MG/1
TABLET ORAL
Refills: 1 | COMMUNITY
Start: 2019-08-16 | End: 2020-03-02

## 2019-09-16 RX ORDER — PERPHENAZINE 16 MG/1
TABLET, FILM COATED ORAL
Refills: 12 | COMMUNITY
Start: 2019-06-12

## 2019-09-16 RX ORDER — NEOMYCIN AND POLYMYXIN B SULFATES AND BACITRACIN ZINC 3.5; 10000; 4 MG/G; [USP'U]/G; [USP'U]/G
OINTMENT OPHTHALMIC
Refills: 2 | COMMUNITY
Start: 2019-07-31 | End: 2022-01-01

## 2019-09-16 NOTE — PROGRESS NOTES
MONSERRAT  Gely Carlisle is a 84 y.o. female who is here for follow up for ongoing medical issues.  Followed by endocrinologist every 6 months and was told should see primary care physician in between every 3 months.  All of this was discussed.  Patient has severe back pain previously followed by orthopedist who retired.  Discussed finding a new orthopedist that specializes in back problems.  Patient requesting some pain pills, still has 2 pain pills left from prescription given a year ago.  All of this was discussed including risks versus benefits of pain medication.  Also questions regarding urine and sugar spilling into the urine and this was discussed.  Apparently confused because endocrinologist was checking urine for microalbumin.  Overall patient seems to be doing amazingly well considering her multiple medical problems noted and will continue current regimen.  Would like to get lab work results from her endocrinologist which I assume is being done every 6 months.      Review of Systems   HENT: Positive for postnasal drip.    Respiratory: Positive for shortness of breath and wheezing.    Cardiovascular: Positive for leg swelling.   Genitourinary: Positive for frequency and urgency.   Musculoskeletal: Positive for back pain.   Allergic/Immunologic: Positive for environmental allergies, food allergies and immunocompromised state.         Past Medical History:   Diagnosis Date   • Asthma    • Bronchiectasis (CMS/HCC)    • Cancer (CMS/HCC)     cervical and skin   • Colitis    • COPD (chronic obstructive pulmonary disease) (CMS/HCC)    • Diabetes mellitus (CMS/HCC)     hyperglycemia   • Disease of thyroid gland     hypothyroidism   • History of transfusion    • Hyperlipidemia    • Insufficiency, adrenal (CMS/HCC)    • Leukocytosis    • Lymphedema    • MR (mitral regurgitation)    • MVP (mitral valve prolapse)    • Osteoarthritis    • Osteoporosis    • Osteoporosis    • Scoliosis    • Urinary tract infection         Past Surgical History:   Procedure Laterality Date   • CATARACT EXTRACTION     • CHOLECYSTECTOMY      WITH COMMON BILE DUCT EXPLORATION REMOVAL OF STONE   • COLON SURGERY  1983    RUPTURED DIVERTICULI  DYLAN WORLEY MD   • HERNIA REPAIR  2012    ABDOMINAL LEFT UPPER   • HYSTERECTOMY     • HYSTERECTOMY     • JOINT REPLACEMENT Right     hip  2002 DR BROWN   • LUNG LOBECTOMY Left 1955    BRONCHIECTESIS LOWER LOBE   • TONSILLECTOMY      with adennoidectomy       Family History   Problem Relation Age of Onset   • Heart disease Mother    • Heart attack Mother    • Cancer Sister         BREAST   • No Known Problems Father    • Heart attack Maternal Grandmother    • Heart disease Maternal Grandmother    • Stroke Maternal Grandfather    • No Known Problems Paternal Grandmother    • No Known Problems Paternal Grandfather        Social History     Socioeconomic History   • Marital status:      Spouse name: Not on file   • Number of children: Not on file   • Years of education: Not on file   • Highest education level: Not on file   Tobacco Use   • Smoking status: Passive Smoke Exposure - Never Smoker   • Smokeless tobacco: Never Used   Substance and Sexual Activity   • Alcohol use: No   • Drug use: No   • Sexual activity: No         Physical Exam   Constitutional: She is oriented to person, place, and time. She appears well-developed and well-nourished.   HENT:   Head: Normocephalic.   Nose: Nose normal.   Mouth/Throat: Oropharynx is clear and moist.   Eyes: Conjunctivae are normal. Pupils are equal, round, and reactive to light.   Neck: Normal range of motion.   Cardiovascular: Normal rate and regular rhythm.   Pulmonary/Chest: Effort normal.   Abdominal: Soft. Bowel sounds are normal.   Musculoskeletal: Normal range of motion. She exhibits edema and deformity.   Neurological: She is alert and oriented to person, place, and time. Coordination normal.   Skin: Skin is warm and dry.   Psychiatric: She has a normal  mood and affect. Her behavior is normal. Judgment and thought content normal.         Assessment/Plan    Gely was seen today for hypothyroidism and diabetes.    Diagnoses and all orders for this visit:    High risk medication use  -     CBC & Differential  -     Basic Metabolic Panel  -     Urinalysis With Microscopic If Indicated (No Culture) - Urine, Clean Catch    Controlled type 2 diabetes mellitus with other circulatory complication, without long-term current use of insulin (CMS/HCC)  -     Basic Metabolic Panel  -     Urinalysis With Microscopic If Indicated (No Culture) - Urine, Clean Catch    Chronic obstructive pulmonary disease, unspecified COPD type (CMS/HCC)    Acquired kyphoscoliosis  -     HYDROcodone-acetaminophen (NORCO) 5-325 MG per tablet; Take 1 tablet by mouth Every 4 (Four) Hours As Needed for Moderate Pain  or Severe Pain .      Patient with multiple ongoing medical issues some of which are noted above.  Severe kyphoscoliosis and previous orthopedist retired.  Given names of other orthopedist to specialize in back problems.  Overall seems to be doing amazingly well on current regimen which will be continued including follow-up every 6 months alternating with endocrinologist.  Will try to get lab work from endocrinologist and hopefully scanned somewhere into epic where results can be found?    This note includes information entered using a voice recognition dictation system.  Though reviewed, some nonsensible errors may remain.

## 2019-09-17 ENCOUNTER — TRANSCRIBE ORDERS (OUTPATIENT)
Dept: CARDIAC REHAB | Facility: HOSPITAL | Age: 84
End: 2019-09-17

## 2019-09-17 ENCOUNTER — OFFICE VISIT (OUTPATIENT)
Dept: CARDIAC REHAB | Facility: HOSPITAL | Age: 84
End: 2019-09-17

## 2019-09-17 DIAGNOSIS — J44.9 CHRONIC OBSTRUCTIVE PULMONARY DISEASE, UNSPECIFIED COPD TYPE (HCC): Primary | ICD-10-CM

## 2019-09-17 LAB
BASOPHILS # BLD AUTO: 0.04 10*3/MM3 (ref 0–0.2)
BASOPHILS NFR BLD AUTO: 0.3 % (ref 0–1.5)
BUN SERPL-MCNC: 25 MG/DL (ref 8–23)
BUN/CREAT SERPL: 36.8 (ref 7–25)
CALCIUM SERPL-MCNC: 9.2 MG/DL (ref 8.6–10.5)
CHLORIDE SERPL-SCNC: 101 MMOL/L (ref 98–107)
CO2 SERPL-SCNC: 26.6 MMOL/L (ref 22–29)
CREAT SERPL-MCNC: 0.68 MG/DL (ref 0.57–1)
EOSINOPHIL # BLD AUTO: 0.09 10*3/MM3 (ref 0–0.4)
EOSINOPHIL NFR BLD AUTO: 0.8 % (ref 0.3–6.2)
ERYTHROCYTE [DISTWIDTH] IN BLOOD BY AUTOMATED COUNT: 14.9 % (ref 12.3–15.4)
GLUCOSE SERPL-MCNC: 135 MG/DL (ref 65–99)
HCT VFR BLD AUTO: 42.3 % (ref 34–46.6)
HGB BLD-MCNC: 13 G/DL (ref 12–15.9)
IMM GRANULOCYTES # BLD AUTO: 0.1 10*3/MM3 (ref 0–0.05)
IMM GRANULOCYTES NFR BLD AUTO: 0.8 % (ref 0–0.5)
LYMPHOCYTES # BLD AUTO: 2.48 10*3/MM3 (ref 0.7–3.1)
LYMPHOCYTES NFR BLD AUTO: 20.8 % (ref 19.6–45.3)
MCH RBC QN AUTO: 28.3 PG (ref 26.6–33)
MCHC RBC AUTO-ENTMCNC: 30.7 G/DL (ref 31.5–35.7)
MCV RBC AUTO: 92.2 FL (ref 79–97)
MONOCYTES # BLD AUTO: 1.29 10*3/MM3 (ref 0.1–0.9)
MONOCYTES NFR BLD AUTO: 10.8 % (ref 5–12)
NEUTROPHILS # BLD AUTO: 7.93 10*3/MM3 (ref 1.7–7)
NEUTROPHILS NFR BLD AUTO: 66.5 % (ref 42.7–76)
NRBC BLD AUTO-RTO: 0 /100 WBC (ref 0–0.2)
PLATELET # BLD AUTO: 247 10*3/MM3 (ref 140–450)
POTASSIUM SERPL-SCNC: 4.1 MMOL/L (ref 3.5–5.2)
RBC # BLD AUTO: 4.59 10*6/MM3 (ref 3.77–5.28)
SODIUM SERPL-SCNC: 141 MMOL/L (ref 136–145)
WBC # BLD AUTO: 11.93 10*3/MM3 (ref 3.4–10.8)

## 2019-09-17 NOTE — PROGRESS NOTES
"Pulmonary Rehab Initial Assessment      Name: Gely Carlisle  :1935 Allergies:Codeine; Demerol [meperidine]; Morphine and related; Aminophylline; Avelox [moxifloxacin hcl in nacl]; Sulfa antibiotics; Benadryl allergy [diphenhydramine hcl (sleep)]; Biaxin [clarithromycin]; Latex; Levaquin [levofloxacin in d5w]; Macrobid [nitrofurantoin monohyd macro]; and Penicillins   MRN: [id number] 84 y.o. Physician: Arnaud Paredes MD   Primary Diagnosis:COPD Event Date:  Specialist: Marv Hudson Jr, MD   Secondary Diagnosis: Bronchiectasis  Note Author: Elpidio Bailey     Cardiovascular History: Mitral Valve Regurgitation      EXERCISE AT HOME  no    N/A          Ambulatory Status:Cane  Ambulatory Fall Risk Assessed on Initial Visit: yes 6 Minute Walk Pre- Pulmonary Rehab:  Distance:429ft      RPE:2        RPD: 2              MPH: 0.8  Max. HR: 96        SPO2:93-96%    MET: 1.6  Resting BP: 110/56     Peak BP: 126/64  Recovery BP: 112/60  Comments: Patient used a cane and took 2 rests while completing the 6 MWT. Patient stated that she had lumbar pain that affected the test more than her pulmonary issues.      NUTRITION  Lipids:yes If yes, labs as follows;  Total: No components found for: CHOLESTEROL  HDL:   HDL Cholesterol   Date Value Ref Range Status   2018 81 (H) 40 - 60 mg/dL Final    Lipids continued:  LDL:  LDL Cholesterol    Date Value Ref Range Status   2018 143 (H) 0 - 100 mg/dL Final     Triglyceride: No components found for: TRIGLYCERIDE   Weight Management:                 Weight: 97.4  Height: 60\"                                   BMI: There is no height or weight on file to calculate BMI.  Waist Circumference: 34 inches   Alcohol Use: none Diabetes:Yes,  Monitors BS at home- yes, Frequency: 3x/wk, Random BS:     Last HGBA1C with date if applicable:No components found for: A1C         SOCIAL HISTORY  Social History     Socioeconomic History   • Marital status:      Spouse " name: Not on file   • Number of children: Not on file   • Years of education: Not on file   • Highest education level: Not on file   Tobacco Use   • Smoking status: Passive Smoke Exposure - Never Smoker   • Smokeless tobacco: Never Used   Substance and Sexual Activity   • Alcohol use: No   • Drug use: No   • Sexual activity: No    Learning Barriers:Ready to Learn  Family Support:yes  Living Arrangement: lives alone Tobacco Adjunct:not applicable  Do you live with a smoker: no     PSYCHOSOCIAL  Clinical Depression: no    Stress: yes     Assess presence or absence of depression using a valid screening tool: yes      Assessment: Patient is ready to get back into a regular exercise routine and regain some independence.            Are you being hurt, hit, or freightened by anyone at home or in your life? no    Are you being neglected by a caregiver? N/A Shoulder flexibility/Range of motion: Poor     Recommended arm activity: Arm Ergometer    Chair sit and reach within: 13 inches   Leg flexibility: Poor    Leg Strength/Balance/Five times sit to stand: 20 seconds.       Recommended stretching: Chair   Balance: Balance Dysfunction Assessment: Patient used her arms and rocked slightly while completing sit to stands. No pain while completing sit to stands. Balance is below average due to her knee potentially giving out and her falling. She uses a cane to assist her while she walks around. Pt. States that she lives a sedentary lifestyle and wishes to change that to regain strength to complete ADLs.     Family attends IA: no      COMORBIDITIES  Sleep Apnea: no If yes, Choose: N/A    Cancer: yes    Stroke: no   Pneumonia: yes If yes, >3 times     Osteoporosis: yes    GI Problems: yes Frequent colds/allergies: yes    Other illnesses, surgeries, or comments: Pt. Had a lobe of her lung removed due to bronchiectasis.    PAIN:  Are you having pain? yes  If yes where is pain? Lumbar  If yes, pain scale: 4      PULMONARY:  Do you use a  nebulizer?: yes   If yes, twice a day with the life vest     Do you use oxygen at home?: no  If yes, amount n/a    With rest: no  With activity: no Do you have a daily cough?: no  If yes, choose: n/a    Do you every notice yourself wheezing?: yes  If yes, when: any time throughout the day Other pulmonary/breathing problems?: yes   OTHER:  Do you have physical limitations?: yes  If yes, osteoperosis, scoliosis, arthritis    Do you need assistance with ADLs?: yes  If yes, Patient is not driving due to physical fatigue and safety concerns. Do you climb stairs at home?:yes  If yes, how many times 5 stairs, maximum of 2 times a day.    Have you ever attended a pulmonary rehab?: yes  If yes, patient attended Phase II and Phase III in 2016.  MRC Dyspnea Scale: 0 - 4:  2 = walks slower than people of the same age on the level because of breathlessness, or has to stop for breath when walking at own pace on the level     Patient Goals: Patient would like to gain weight and get to around 115 lbs, not walk with a cane, and have more energy for ADLs     DISCHARGE PLANNING:  Do you have any home exercise equipment?: no    What are you plans for continuing exercise after completion of pulmonary rehab? Continue to exercise in Phase III     EDUCATION:  Pursed - lip breathing       PRE-PROGRAM ASSESSMENT:  PFT Date: 07/22/2019    FEV1/FVC: 55 FEV1: 0.95    FVC: 1.72 DLCO: 12.26     Time of arrival: 1320    Time of departure: 1500           9/17/2019  1:19 PM  Elpidio Baiely

## 2019-09-19 ENCOUNTER — OFFICE VISIT (OUTPATIENT)
Dept: CARDIAC REHAB | Facility: HOSPITAL | Age: 84
End: 2019-09-19

## 2019-09-19 DIAGNOSIS — J44.9 CHRONIC OBSTRUCTIVE PULMONARY DISEASE, UNSPECIFIED COPD TYPE (HCC): Primary | ICD-10-CM

## 2019-09-19 LAB
APPEARANCE UR: CLEAR
BILIRUB UR QL STRIP: NEGATIVE
COLOR UR: YELLOW
GLUCOSE UR QL: NEGATIVE
HGB UR QL STRIP: NEGATIVE
KETONES UR QL STRIP: NEGATIVE
LEUKOCYTE ESTERASE UR QL STRIP: NEGATIVE
NITRITE UR QL STRIP: NEGATIVE
PH UR STRIP: 6.5 [PH] (ref 5–8)
PROT UR QL STRIP: NEGATIVE
SP GR UR: 1.01 (ref 1–1.03)
UROBILINOGEN UR STRIP-MCNC: NORMAL MG/DL

## 2019-09-24 ENCOUNTER — OFFICE VISIT (OUTPATIENT)
Dept: CARDIAC REHAB | Facility: HOSPITAL | Age: 84
End: 2019-09-24

## 2019-09-24 DIAGNOSIS — J44.9 CHRONIC OBSTRUCTIVE PULMONARY DISEASE, UNSPECIFIED COPD TYPE (HCC): Primary | ICD-10-CM

## 2019-09-26 ENCOUNTER — OFFICE VISIT (OUTPATIENT)
Dept: CARDIAC REHAB | Facility: HOSPITAL | Age: 84
End: 2019-09-26

## 2019-09-26 DIAGNOSIS — J44.9 CHRONIC OBSTRUCTIVE PULMONARY DISEASE, UNSPECIFIED COPD TYPE (HCC): Primary | ICD-10-CM

## 2019-10-03 ENCOUNTER — OFFICE VISIT (OUTPATIENT)
Dept: CARDIAC REHAB | Facility: HOSPITAL | Age: 84
End: 2019-10-03

## 2019-10-03 DIAGNOSIS — J44.9 CHRONIC OBSTRUCTIVE PULMONARY DISEASE, UNSPECIFIED COPD TYPE (HCC): Primary | ICD-10-CM

## 2019-10-08 ENCOUNTER — CLINICAL SUPPORT (OUTPATIENT)
Dept: CARDIOLOGY | Facility: CLINIC | Age: 84
End: 2019-10-08

## 2019-10-08 DIAGNOSIS — I49.3 PVC'S (PREMATURE VENTRICULAR CONTRACTIONS): Primary | ICD-10-CM

## 2019-10-08 PROCEDURE — 93000 ELECTROCARDIOGRAM COMPLETE: CPT | Performed by: INTERNAL MEDICINE

## 2019-10-08 NOTE — PROGRESS NOTES
Procedure     ECG 12 Lead  Date/Time: 10/8/2019 12:17 PM  Performed by: Jennifer Brown MD  Authorized by: Jennifer Brown MD   Comparison: compared with previous ECG   Similar to previous ECG  Rhythm: sinus rhythm  Ectopy: unifocal PVCs  Conduction: left anterior fascicular block  Other findings: poor R wave progression    Clinical impression: abnormal EKG

## 2019-10-10 ENCOUNTER — OFFICE VISIT (OUTPATIENT)
Dept: CARDIAC REHAB | Facility: HOSPITAL | Age: 84
End: 2019-10-10

## 2019-10-10 DIAGNOSIS — J44.9 CHRONIC OBSTRUCTIVE PULMONARY DISEASE, UNSPECIFIED COPD TYPE (HCC): Primary | ICD-10-CM

## 2019-10-15 ENCOUNTER — OFFICE VISIT (OUTPATIENT)
Dept: CARDIAC REHAB | Facility: HOSPITAL | Age: 84
End: 2019-10-15

## 2019-10-15 DIAGNOSIS — J44.9 CHRONIC OBSTRUCTIVE PULMONARY DISEASE, UNSPECIFIED COPD TYPE (HCC): Primary | ICD-10-CM

## 2019-10-17 ENCOUNTER — OFFICE VISIT (OUTPATIENT)
Dept: CARDIAC REHAB | Facility: HOSPITAL | Age: 84
End: 2019-10-17

## 2019-10-17 DIAGNOSIS — J44.9 CHRONIC OBSTRUCTIVE PULMONARY DISEASE, UNSPECIFIED COPD TYPE (HCC): Primary | ICD-10-CM

## 2019-10-24 ENCOUNTER — OFFICE VISIT (OUTPATIENT)
Dept: CARDIAC REHAB | Facility: HOSPITAL | Age: 84
End: 2019-10-24

## 2019-10-24 DIAGNOSIS — J44.9 CHRONIC OBSTRUCTIVE PULMONARY DISEASE, UNSPECIFIED COPD TYPE (HCC): Primary | ICD-10-CM

## 2019-10-29 ENCOUNTER — OFFICE VISIT (OUTPATIENT)
Dept: CARDIAC REHAB | Facility: HOSPITAL | Age: 84
End: 2019-10-29

## 2019-10-29 DIAGNOSIS — J44.9 CHRONIC OBSTRUCTIVE PULMONARY DISEASE, UNSPECIFIED COPD TYPE (HCC): Primary | ICD-10-CM

## 2019-11-05 ENCOUNTER — OFFICE VISIT (OUTPATIENT)
Dept: CARDIAC REHAB | Facility: HOSPITAL | Age: 84
End: 2019-11-05

## 2019-11-05 DIAGNOSIS — J44.9 CHRONIC OBSTRUCTIVE PULMONARY DISEASE, UNSPECIFIED COPD TYPE (HCC): Primary | ICD-10-CM

## 2019-11-09 ENCOUNTER — OFFICE VISIT (OUTPATIENT)
Dept: CARDIAC REHAB | Facility: HOSPITAL | Age: 84
End: 2019-11-09

## 2019-11-09 DIAGNOSIS — J44.9 CHRONIC OBSTRUCTIVE PULMONARY DISEASE, UNSPECIFIED COPD TYPE (HCC): Primary | ICD-10-CM

## 2019-11-26 ENCOUNTER — OFFICE VISIT (OUTPATIENT)
Dept: CARDIAC REHAB | Facility: HOSPITAL | Age: 84
End: 2019-11-26

## 2019-11-26 DIAGNOSIS — J44.9 CHRONIC OBSTRUCTIVE PULMONARY DISEASE, UNSPECIFIED COPD TYPE (HCC): Primary | ICD-10-CM

## 2019-12-12 ENCOUNTER — OFFICE VISIT (OUTPATIENT)
Dept: CARDIAC REHAB | Facility: HOSPITAL | Age: 84
End: 2019-12-12

## 2019-12-12 DIAGNOSIS — J44.9 CHRONIC OBSTRUCTIVE PULMONARY DISEASE, UNSPECIFIED COPD TYPE (HCC): Primary | ICD-10-CM

## 2020-02-21 ENCOUNTER — TELEPHONE (OUTPATIENT)
Dept: FAMILY MEDICINE CLINIC | Facility: CLINIC | Age: 85
End: 2020-02-21

## 2020-02-21 NOTE — TELEPHONE ENCOUNTER
HYDROcodone-acetaminophen (NORCO) 5-325 MG per tablet  Sig: Take 1 tablet by mouth Every 4 (Four) Hours As Needed for Moderate Pain  or Severe Pain .    [id number] - [facility] [address] AT [facility] [phone] [facility] [phone]

## 2020-02-24 ENCOUNTER — TELEPHONE (OUTPATIENT)
Dept: FAMILY MEDICINE CLINIC | Facility: CLINIC | Age: 85
End: 2020-02-24

## 2020-02-24 DIAGNOSIS — M41.9 ACQUIRED KYPHOSCOLIOSIS: ICD-10-CM

## 2020-02-24 RX ORDER — HYDROCODONE BITARTRATE AND ACETAMINOPHEN 5; 325 MG/1; MG/1
1 TABLET ORAL EVERY 4 HOURS PRN
Qty: 30 TABLET | Refills: 0 | Status: SHIPPED | OUTPATIENT
Start: 2020-02-24 | End: 2020-03-02 | Stop reason: SDUPTHER

## 2020-02-24 NOTE — TELEPHONE ENCOUNTER
Hydrocodone 9/16/19 #30 rx    Ov 2/21/20 OV  Dioni lizarraga     HYDROcodone-acetaminophen (NORCO) 5-325 MG per tablet  Sig: Take 1 tablet by mouth Every 4 (Four) Hours As Needed for Moderate Pain  or Severe Pain .     [facility] #[id number] [place], [place] - [address]  AT [facility] [phone] [facility] [phone]

## 2020-03-02 ENCOUNTER — OFFICE VISIT (OUTPATIENT)
Dept: FAMILY MEDICINE CLINIC | Facility: CLINIC | Age: 85
End: 2020-03-02

## 2020-03-02 VITALS
TEMPERATURE: 98.1 F | BODY MASS INDEX: 18.77 KG/M2 | DIASTOLIC BLOOD PRESSURE: 70 MMHG | OXYGEN SATURATION: 94 % | HEART RATE: 85 BPM | HEIGHT: 60 IN | WEIGHT: 95.6 LBS | SYSTOLIC BLOOD PRESSURE: 118 MMHG | RESPIRATION RATE: 20 BRPM

## 2020-03-02 DIAGNOSIS — E03.9 ACQUIRED HYPOTHYROIDISM: Primary | ICD-10-CM

## 2020-03-02 DIAGNOSIS — M41.9 ACQUIRED KYPHOSCOLIOSIS: ICD-10-CM

## 2020-03-02 DIAGNOSIS — M41.9 KYPHOSCOLIOSIS: ICD-10-CM

## 2020-03-02 DIAGNOSIS — M81.0 AGE-RELATED OSTEOPOROSIS WITHOUT CURRENT PATHOLOGICAL FRACTURE: ICD-10-CM

## 2020-03-02 DIAGNOSIS — E11.65 CONTROLLED TYPE 2 DIABETES MELLITUS WITH HYPERGLYCEMIA, WITHOUT LONG-TERM CURRENT USE OF INSULIN (HCC): ICD-10-CM

## 2020-03-02 DIAGNOSIS — Z79.899 HIGH RISK MEDICATION USE: ICD-10-CM

## 2020-03-02 DIAGNOSIS — E78.5 HYPERLIPIDEMIA, UNSPECIFIED HYPERLIPIDEMIA TYPE: ICD-10-CM

## 2020-03-02 PROCEDURE — 99213 OFFICE O/P EST LOW 20 MIN: CPT | Performed by: FAMILY MEDICINE

## 2020-03-02 RX ORDER — HYDROCODONE BITARTRATE AND ACETAMINOPHEN 5; 325 MG/1; MG/1
1 TABLET ORAL EVERY 4 HOURS PRN
Qty: 30 TABLET | Refills: 0 | Status: SHIPPED | OUTPATIENT
Start: 2020-03-02 | End: 2020-03-03 | Stop reason: SDUPTHER

## 2020-03-02 NOTE — PROGRESS NOTES
HPI  Gely Carlisle is a 84 y.o. female who is here for follow up of diabetes osteoporosis severe kyphoscoliosis and other medical issues.  Patient's main complaint is severe back pain.  Severe deformity noted of the back and also reports diagnosis of spinal stenosis.  Again severe deformity noted.  Was sent pain medication last month but for some reason pharmacy told her prescription was not gotten?  Will print prescription today as discussed.  Will return in the morning for fasting lab work.  Recent lab work from endocrinologist reviewed, specifically TSH level normal after adjusting medication.      Review of Systems   Respiratory: Positive for shortness of breath and wheezing.    Musculoskeletal: Positive for back pain.   Allergic/Immunologic: Positive for immunocompromised state.   All other systems reviewed and are negative.        Past Medical History:   Diagnosis Date   • Asthma    • Bronchiectasis (CMS/HCC)    • Cancer (CMS/HCC)     cervical and skin   • Colitis    • COPD (chronic obstructive pulmonary disease) (CMS/HCC)    • Diabetes mellitus (CMS/HCC)     hyperglycemia   • Disease of thyroid gland     hypothyroidism   • History of transfusion    • Hyperlipidemia    • Insufficiency, adrenal (CMS/HCC)    • Leukocytosis    • Lymphedema    • MR (mitral regurgitation)    • MVP (mitral valve prolapse)    • Osteoarthritis    • Osteoporosis    • Osteoporosis    • Scoliosis    • Urinary tract infection        Past Surgical History:   Procedure Laterality Date   • CATARACT EXTRACTION     • CHOLECYSTECTOMY      WITH COMMON BILE DUCT EXPLORATION REMOVAL OF STONE   • COLON SURGERY  1983    RUPTURED DIVERTICULI  DYLAN WORLEY MD   • HERNIA REPAIR  2012    ABDOMINAL LEFT UPPER   • HYSTERECTOMY     • HYSTERECTOMY     • JOINT REPLACEMENT Right     hip  2002 DR BROWN   • LUNG LOBECTOMY Left 1955    BRONCHIECTESIS LOWER LOBE   • TONSILLECTOMY      with adennoidectomy       Family History   Problem Relation Age of Onset    • Heart disease Mother    • Heart attack Mother    • Cancer Sister         BREAST   • No Known Problems Father    • Heart attack Maternal Grandmother    • Heart disease Maternal Grandmother    • Stroke Maternal Grandfather    • No Known Problems Paternal Grandmother    • No Known Problems Paternal Grandfather        Social History     Socioeconomic History   • Marital status:      Spouse name: Not on file   • Number of children: Not on file   • Years of education: Not on file   • Highest education level: Not on file   Tobacco Use   • Smoking status: Passive Smoke Exposure - Never Smoker   • Smokeless tobacco: Never Used   Substance and Sexual Activity   • Alcohol use: No   • Drug use: No   • Sexual activity: Never         Physical Exam   Constitutional: She is oriented to person, place, and time. She appears well-developed and well-nourished. No distress.   HENT:   Head: Normocephalic.   Nose: Nose normal.   Mouth/Throat: Oropharynx is clear and moist. No oropharyngeal exudate.   Eyes: Pupils are equal, round, and reactive to light. Conjunctivae and EOM are normal.   Neck: Normal range of motion. Neck supple.   Cardiovascular: Normal rate and regular rhythm.   Pulmonary/Chest: Effort normal. No respiratory distress.   Musculoskeletal: She exhibits deformity. She exhibits no edema or tenderness.        Thoracic back: She exhibits decreased range of motion and deformity. She exhibits no bony tenderness.        Lumbar back: She exhibits decreased range of motion and deformity. She exhibits no bony tenderness.   Lymphadenopathy:     She has no cervical adenopathy.   Neurological: She is alert and oriented to person, place, and time. No cranial nerve deficit.   Skin: Skin is warm and dry.   Psychiatric: She has a normal mood and affect. Her behavior is normal. Judgment and thought content normal.   Nursing note and vitals reviewed.        Assessment/Plan    Gely was seen today for diabetes.    Diagnoses and  all orders for this visit:    Acquired hypothyroidism    Acquired kyphoscoliosis  -     HYDROcodone-acetaminophen (NORCO) 5-325 MG per tablet; Take 1 tablet by mouth Every 4 (Four) Hours As Needed for Moderate Pain  or Severe Pain .    Hyperlipidemia, unspecified hyperlipidemia type  -     Lipid Panel; Future    Controlled type 2 diabetes mellitus with hyperglycemia, without long-term current use of insulin (CMS/[facility])  -     CBC & Differential; Future  -     Comprehensive Metabolic Panel; Future  -     Hemoglobin A1c; Future    Age-related osteoporosis without current pathological fracture    Kyphoscoliosis    High risk medication use  -     CBC & Differential; Future  -     Comprehensive Metabolic Panel; Future    Patient here for routine follow-up of above-noted medical problems.  Recent evaluation by endocrinologist and other specialist noted including lab work.  Will return for fasting lab work in the morning.  Brother gets Flexeril for his back pain but risks versus benefits discussed.  For now probably safer to just use pain medication as needed.  Especially in view of reported history of multiple side effects and allergies prefer not to add another medication with potential risks.  Otherwise continue current therapy including pain medication as needed.  There is no evidence of abuse nor misuse of medication.  Follow-up in 6 months at which time Medicare wellness evaluation will be done.    This note includes information entered using a voice recognition dictation system.  Though reviewed, some nonsensible errors may remain.

## 2020-03-03 ENCOUNTER — RESULTS ENCOUNTER (OUTPATIENT)
Dept: FAMILY MEDICINE CLINIC | Facility: CLINIC | Age: 85
End: 2020-03-03

## 2020-03-03 DIAGNOSIS — M41.9 ACQUIRED KYPHOSCOLIOSIS: ICD-10-CM

## 2020-03-03 DIAGNOSIS — Z79.899 HIGH RISK MEDICATION USE: ICD-10-CM

## 2020-03-03 DIAGNOSIS — E11.65 CONTROLLED TYPE 2 DIABETES MELLITUS WITH HYPERGLYCEMIA, WITHOUT LONG-TERM CURRENT USE OF INSULIN (HCC): ICD-10-CM

## 2020-03-03 DIAGNOSIS — E78.5 HYPERLIPIDEMIA, UNSPECIFIED HYPERLIPIDEMIA TYPE: ICD-10-CM

## 2020-03-03 RX ORDER — HYDROCODONE BITARTRATE AND ACETAMINOPHEN 5; 325 MG/1; MG/1
1 TABLET ORAL EVERY 4 HOURS PRN
Qty: 30 TABLET | Refills: 0 | Status: SHIPPED | OUTPATIENT
Start: 2020-03-03 | End: 2020-03-04 | Stop reason: SDUPTHER

## 2020-03-03 NOTE — TELEPHONE ENCOUNTER
PT SAYS THIS NEEDS TO BE SENT TO HER PHARM, NOT PRINTED    HYDROcodone-acetaminophen (NORCO) 5-325 MG per tablet   Sig: Take 1 tablet by mouth Every 4 (Four) Hours As Needed for Moderate Pain  or Severe Pain .    [id number] - [facility] [address]  AT [facility] [phone] [facility] [phone] FX

## 2020-03-04 LAB
ALBUMIN SERPL-MCNC: 3.9 G/DL (ref 3.5–5.2)
ALBUMIN/GLOB SERPL: 1.3 G/DL
ALP SERPL-CCNC: 51 U/L (ref 39–117)
ALT SERPL-CCNC: 12 U/L (ref 1–33)
AST SERPL-CCNC: 15 U/L (ref 1–32)
BASOPHILS # BLD AUTO: 0.04 10*3/MM3 (ref 0–0.2)
BASOPHILS NFR BLD AUTO: 0.3 % (ref 0–1.5)
BILIRUB SERPL-MCNC: 0.3 MG/DL (ref 0.2–1.2)
BUN SERPL-MCNC: 16 MG/DL (ref 8–23)
BUN/CREAT SERPL: 28.1 (ref 7–25)
CALCIUM SERPL-MCNC: 9.3 MG/DL (ref 8.6–10.5)
CHLORIDE SERPL-SCNC: 99 MMOL/L (ref 98–107)
CHOLEST SERPL-MCNC: 198 MG/DL (ref 0–200)
CO2 SERPL-SCNC: 28.3 MMOL/L (ref 22–29)
CREAT SERPL-MCNC: 0.57 MG/DL (ref 0.57–1)
EOSINOPHIL # BLD AUTO: 0.12 10*3/MM3 (ref 0–0.4)
EOSINOPHIL NFR BLD AUTO: 1 % (ref 0.3–6.2)
ERYTHROCYTE [DISTWIDTH] IN BLOOD BY AUTOMATED COUNT: 13.3 % (ref 12.3–15.4)
GLOBULIN SER CALC-MCNC: 3.1 GM/DL
GLUCOSE SERPL-MCNC: 98 MG/DL (ref 65–99)
HBA1C MFR BLD: 6.1 % (ref 4.8–5.6)
HCT VFR BLD AUTO: 42.7 % (ref 34–46.6)
HDLC SERPL-MCNC: 78 MG/DL (ref 40–60)
HGB BLD-MCNC: 13.7 G/DL (ref 12–15.9)
IMM GRANULOCYTES # BLD AUTO: 0.1 10*3/MM3 (ref 0–0.05)
IMM GRANULOCYTES NFR BLD AUTO: 0.8 % (ref 0–0.5)
LDLC SERPL CALC-MCNC: 102 MG/DL (ref 0–100)
LYMPHOCYTES # BLD AUTO: 3.01 10*3/MM3 (ref 0.7–3.1)
LYMPHOCYTES NFR BLD AUTO: 25.5 % (ref 19.6–45.3)
MCH RBC QN AUTO: 27.8 PG (ref 26.6–33)
MCHC RBC AUTO-ENTMCNC: 32.1 G/DL (ref 31.5–35.7)
MCV RBC AUTO: 86.8 FL (ref 79–97)
MONOCYTES # BLD AUTO: 1.11 10*3/MM3 (ref 0.1–0.9)
MONOCYTES NFR BLD AUTO: 9.4 % (ref 5–12)
NEUTROPHILS # BLD AUTO: 7.41 10*3/MM3 (ref 1.7–7)
NEUTROPHILS NFR BLD AUTO: 63 % (ref 42.7–76)
NRBC BLD AUTO-RTO: 0 /100 WBC (ref 0–0.2)
PLATELET # BLD AUTO: 286 10*3/MM3 (ref 140–450)
POTASSIUM SERPL-SCNC: 4.2 MMOL/L (ref 3.5–5.2)
PROT SERPL-MCNC: 7 G/DL (ref 6–8.5)
RBC # BLD AUTO: 4.92 10*6/MM3 (ref 3.77–5.28)
SODIUM SERPL-SCNC: 141 MMOL/L (ref 136–145)
TRIGL SERPL-MCNC: 89 MG/DL (ref 0–150)
VLDLC SERPL CALC-MCNC: 17.8 MG/DL
WBC # BLD AUTO: 11.79 10*3/MM3 (ref 3.4–10.8)

## 2020-03-04 RX ORDER — HYDROCODONE BITARTRATE AND ACETAMINOPHEN 5; 325 MG/1; MG/1
1 TABLET ORAL EVERY 4 HOURS PRN
Qty: 30 TABLET | Refills: 0 | Status: SHIPPED | OUTPATIENT
Start: 2020-03-04 | End: 2020-06-19 | Stop reason: SDUPTHER

## 2020-06-02 DIAGNOSIS — Z79.899 HIGH RISK MEDICATION USE: ICD-10-CM

## 2020-06-02 DIAGNOSIS — E78.5 HYPERLIPIDEMIA, UNSPECIFIED HYPERLIPIDEMIA TYPE: ICD-10-CM

## 2020-06-02 DIAGNOSIS — E03.8 SECONDARY HYPOTHYROIDISM: ICD-10-CM

## 2020-06-02 DIAGNOSIS — I10 BENIGN HYPERTENSION: ICD-10-CM

## 2020-06-02 DIAGNOSIS — E11.65 TYPE 2 DIABETES MELLITUS WITH HYPERGLYCEMIA, WITHOUT LONG-TERM CURRENT USE OF INSULIN (HCC): Primary | ICD-10-CM

## 2020-06-03 LAB
ALBUMIN SERPL-MCNC: 3.7 G/DL (ref 3.5–5.2)
ALBUMIN/CREAT UR: <9 MG/G CREAT (ref 0–29)
ALBUMIN/GLOB SERPL: 1.2 G/DL
ALP SERPL-CCNC: 49 U/L (ref 39–117)
ALT SERPL-CCNC: 12 U/L (ref 1–33)
AST SERPL-CCNC: 16 U/L (ref 1–32)
BILIRUB SERPL-MCNC: 0.3 MG/DL (ref 0.2–1.2)
BUN SERPL-MCNC: 18 MG/DL (ref 8–23)
BUN/CREAT SERPL: 32.1 (ref 7–25)
CALCIUM SERPL-MCNC: 9.2 MG/DL (ref 8.6–10.5)
CHLORIDE SERPL-SCNC: 102 MMOL/L (ref 98–107)
CHOLEST SERPL-MCNC: 207 MG/DL (ref 0–200)
CO2 SERPL-SCNC: 29.3 MMOL/L (ref 22–29)
CREAT SERPL-MCNC: 0.56 MG/DL (ref 0.57–1)
CREAT UR-MCNC: 33 MG/DL
GLOBULIN SER CALC-MCNC: 3 GM/DL
GLUCOSE SERPL-MCNC: 87 MG/DL (ref 65–99)
HBA1C MFR BLD: 5.9 % (ref 4.8–5.6)
HDLC SERPL-MCNC: 75 MG/DL (ref 40–60)
LDLC SERPL CALC-MCNC: 117 MG/DL (ref 0–100)
MICROALBUMIN UR-MCNC: <3 UG/ML
POTASSIUM SERPL-SCNC: 4.2 MMOL/L (ref 3.5–5.2)
PROT SERPL-MCNC: 6.7 G/DL (ref 6–8.5)
SODIUM SERPL-SCNC: 141 MMOL/L (ref 136–145)
T4 FREE SERPL-MCNC: 1.77 NG/DL (ref 0.93–1.7)
TRIGL SERPL-MCNC: 76 MG/DL (ref 0–150)
TSH SERPL DL<=0.005 MIU/L-ACNC: 4.46 UIU/ML (ref 0.27–4.2)
VLDLC SERPL CALC-MCNC: 15.2 MG/DL

## 2020-06-09 ENCOUNTER — OFFICE VISIT (OUTPATIENT)
Dept: CARDIOLOGY | Facility: CLINIC | Age: 85
End: 2020-06-09

## 2020-06-09 VITALS
HEIGHT: 60 IN | BODY MASS INDEX: 18.3 KG/M2 | HEART RATE: 76 BPM | SYSTOLIC BLOOD PRESSURE: 102 MMHG | WEIGHT: 93.2 LBS | DIASTOLIC BLOOD PRESSURE: 60 MMHG

## 2020-06-09 DIAGNOSIS — I89.0 LYMPHEDEMA: ICD-10-CM

## 2020-06-09 DIAGNOSIS — E27.40 CHRONIC ADRENAL INSUFFICIENCY (HCC): ICD-10-CM

## 2020-06-09 DIAGNOSIS — J44.9 CHRONIC OBSTRUCTIVE PULMONARY DISEASE, UNSPECIFIED COPD TYPE (HCC): ICD-10-CM

## 2020-06-09 DIAGNOSIS — R06.02 BREATH SHORTNESS: ICD-10-CM

## 2020-06-09 DIAGNOSIS — J47.9 BRONCHIECTASIS WITHOUT COMPLICATION (HCC): ICD-10-CM

## 2020-06-09 DIAGNOSIS — I34.1 BILLOWING MITRAL VALVE: ICD-10-CM

## 2020-06-09 DIAGNOSIS — I34.0 MITRAL VALVE INSUFFICIENCY, UNSPECIFIED ETIOLOGY: Primary | ICD-10-CM

## 2020-06-09 PROCEDURE — 93000 ELECTROCARDIOGRAM COMPLETE: CPT | Performed by: INTERNAL MEDICINE

## 2020-06-09 PROCEDURE — 99214 OFFICE O/P EST MOD 30 MIN: CPT | Performed by: INTERNAL MEDICINE

## 2020-06-09 RX ORDER — CIPROFLOXACIN 500 MG/1
500 TABLET, FILM COATED ORAL 2 TIMES DAILY
COMMUNITY
Start: 2020-06-05 | End: 2020-09-03

## 2020-06-09 RX ORDER — PREDNISONE 10 MG/1
40 TABLET ORAL DAILY
COMMUNITY
Start: 2020-06-05 | End: 2020-09-03

## 2020-06-09 NOTE — PROGRESS NOTES
Date of Office Visit: 2020  Encounter Provider: Jennifer Brown MD  Place of Service: [facility] CARDIOLOGY  Patient Name: Gely Carlisle  :1935      Patient ID:  Gely Carlisle is a 84 y.o. female is here for  followup for          History of Present Illness    She has a history of mitral valve prolapse and mitral regurgitation. She had bronchiectasis with chronic lung infections.  She also has asthma and COPD.  She has adrenal insufficiency due to chronic steroid use.  She has diabetes mellitus type 2, gout, thyroid disease, history of cervical and skin cancer.  She has hyperlipidemia.  She had vascular screening done 2017 which showed normal abdominal aorta, normal carotid arteries.  ABIs could not be obtained.    She has previously followed with Dr. Montemayor.  This is her first visit with me.    She is , has 3 children retired.  She uses no cigarettes, alcohol or drugs.  She has 2 cups of coffee per day.  Her daughter is hypertension and her mother had heart disease.    She had laboratory values done 2020 which showed normal CMP, hemoglobin A1c 5.9, TSH 4.46, free T4 1.77, HDL 75, .      She follows with Dr. Hudson.  She recently had her steroids increased due to wheezing, short windedness and cough.  She was also placed on antibiotics because she has symptomatic Pseudomonas.  She has occasionally had PVCs which she feels is palpitations.  She has had no dizziness or syncope.  She has no exertional chest tightness or pressure.  She has chronic lower extremity edema for which she wraps her legs.  She has had no fevers or chills.  She denies orthopnea or PND.    Past Medical History:   Diagnosis Date   • Asthma    • Bronchiectasis (CMS/HCC)    • Cancer (CMS/HCC)     cervical and skin   • Colitis    • COPD (chronic obstructive pulmonary disease) (CMS/HCC)    • Diabetes mellitus (CMS/HCC)     hyperglycemia   • Disease of thyroid gland      hypothyroidism   • History of transfusion    • Hyperlipidemia    • Insufficiency, adrenal (CMS/HCC)    • Leukocytosis    • Lymphedema    • MR (mitral regurgitation)    • MVP (mitral valve prolapse)    • Osteoarthritis    • Osteoporosis    • Scoliosis    • Urinary tract infection          Past Surgical History:   Procedure Laterality Date   • CATARACT EXTRACTION     • CHOLECYSTECTOMY      WITH COMMON BILE DUCT EXPLORATION REMOVAL OF STONE   • COLON SURGERY  1983    RUPTURED DIVERTICULI  DYLAN WORLEY MD   • HERNIA REPAIR  2012    ABDOMINAL LEFT UPPER   • HYSTERECTOMY     • HYSTERECTOMY     • JOINT REPLACEMENT Right     hip  2002 DR BROWN   • LUNG LOBECTOMY Left 1955    BRONCHIECTESIS LOWER LOBE   • TONSILLECTOMY      with adennoidectomy       Current Outpatient Medications on File Prior to Visit   Medication Sig Dispense Refill   • ADVAIR DISKUS 250-50 MCG/DOSE DISKUS INHALE 1 PUFF BID  11   • albuterol (PROVENTIL) (2.5 MG/3ML) 0.083% nebulizer solution Take 2.5 mg by nebulization every 4 (four) hours as needed for wheezing.     • calcium carbonate (OS-RADHA) 600 MG tablet Take 600 mg by mouth 2 (two) times a day with meals.     • cetirizine (ZyrTEC) 10 MG tablet Take 10 mg by mouth Daily.     • ciprofloxacin (CIPRO) 500 MG tablet Take 500 mg by mouth 2 (Two) Times a Day.     • CONTOUR NEXT TEST test strip TEST D  12   • Cranberry 1000 MG capsule Take 1 tablet by mouth Daily.     • Cyanocobalamin (VITAMIN B12 SL) Place 1 tablet under the tongue Daily.     • Glucosamine-Chondroitin (GLUCOSAMINE CHONDROITIN COMPLX) 500-250 MG capsule Take 3 capsules by mouth 2 (Two) Times a Day.     • guaiFENesin (MUCINEX) 600 MG 12 hr tablet Take 1,200 mg by mouth 2 (two) times a day.     • HYDROcodone-acetaminophen (NORCO) 5-325 MG per tablet Take 1 tablet by mouth Every 4 (Four) Hours As Needed for Moderate Pain  or Severe Pain . 30 tablet 0   • levothyroxine (SYNTHROID, LEVOTHROID) 75 MCG tablet Take 75 mcg by mouth daily.     •  montelukast (SINGULAIR) 10 MG tablet Take 10 mg by mouth every night.     • Multiple Vitamins-Minerals (MULTIVITAMIN ADULT PO) Take 1 tablet by mouth Daily.     • THOMAS-POLYCIN [id number] ophthalmic ointment APPLY 1/4 INCH STRIP INTO OU BID FOR 2 WEEKS  2   • nystatin (MYCOSTATIN) 964982 UNIT/ML suspension Swish and swallow 5 mL 4 (Four) Times a Day. 240 mL 2   • predniSONE (DELTASONE) 10 MG tablet Take 40 mg by mouth Daily.     • Probiotic Product (VSL#3 DS PO) Take 1 capsule by mouth Daily with lunch and dinner.     • risedronate (ACTONEL) 35 MG tablet Take 35 mg by mouth Every 7 (Seven) Days. with water on empty stomach, nothing by mouth or lie down for next 30 minutes. Takes on Monday.     • rosuvastatin (CRESTOR) 20 MG tablet Take  by mouth Daily.  6   • sodium chloride 7 % nebulizer solution nebulizer solution USE 1 VIAL PER NEBULIZER TWICE A DAY  3   • tiotropium (SPIRIVA) 18 MCG per inhalation capsule Place 1 capsule into inhaler and inhale 1 (one) time daily. Patient takes the handi mist     • vitamin C (ASCORBIC ACID) 250 MG tablet Take 500 mg by mouth daily.     • [DISCONTINUED] B Complex Vitamins (VITAMIN B COMPLEX) capsule capsule Take 1 capsule by mouth Daily.       No current facility-administered medications on file prior to visit.        Social History     Socioeconomic History   • Marital status:      Spouse name: Not on file   • Number of children: Not on file   • Years of education: Not on file   • Highest education level: Not on file   Tobacco Use   • Smoking status: Passive Smoke Exposure - Never Smoker   • Smokeless tobacco: Never Used   • Tobacco comment: CAFFEINE USE   Substance and Sexual Activity   • Alcohol use: No   • Drug use: No   • Sexual activity: Never           Review of Systems   Constitution: Negative.   HENT: Negative for congestion.    Eyes: Negative for vision loss in left eye and vision loss in right eye.   Respiratory: Negative.  Negative for cough, hemoptysis,  "shortness of breath, sleep disturbances due to breathing, snoring, sputum production and wheezing.    Endocrine: Negative.    Hematologic/Lymphatic: Negative.    Skin: Negative for poor wound healing and rash.   Musculoskeletal: Negative for falls, gout, muscle cramps and myalgias.   Gastrointestinal: Negative for abdominal pain, diarrhea, dysphagia, hematemesis, melena, nausea and vomiting.   Neurological: Negative for excessive daytime sleepiness, dizziness, headaches, light-headedness, loss of balance, seizures and vertigo.   Psychiatric/Behavioral: Negative for depression and substance abuse. The patient is not nervous/anxious.        Procedures    ECG 12 Lead  Date/Time: 6/9/2020 11:07 AM  Performed by: Jennifer Brown MD  Authorized by: Jennifer Brown MD   Comparison: compared with previous ECG   Similar to previous ECG  Rhythm: sinus rhythm  QRS axis: left  Other findings: poor R wave progression    Clinical impression: non-specific ECG                Objective:      Vitals:    06/09/20 1054   BP: 102/60   BP Location: Right arm   Patient Position: Sitting   Pulse: 76   Weight: 42.3 kg (93 lb 3.2 oz)   Height: 152.4 cm (60\")     Body mass index is 18.2 kg/m².    Physical Exam   Constitutional: She is oriented to person, place, and time. She appears well-developed and well-nourished. No distress.   HENT:   Head: Normocephalic and atraumatic.   Eyes: Conjunctivae are normal. No scleral icterus.   Neck: Neck supple. No JVD present. Carotid bruit is not present. No thyromegaly present.   Cardiovascular: Normal rate, regular rhythm, S1 normal, S2 normal and intact distal pulses.  No extrasystoles are present. PMI is not displaced. Exam reveals no gallop.   Murmur heard.   Midsystolic murmur is present with a grade of 3/6 at the upper right sternal border, upper left sternal border and apex.  Pulses:       Carotid pulses are 2+ on the right side, and 2+ on the left side.       Radial pulses are 2+ on " the right side, and 2+ on the left side.        Dorsalis pedis pulses are 2+ on the right side, and 2+ on the left side.        Posterior tibial pulses are 2+ on the right side, and 2+ on the left side.   2+ LE edema   Pulmonary/Chest: Effort normal. No respiratory distress. She has decreased breath sounds. She has wheezes. She has no rhonchi. She has no rales. She exhibits no tenderness.   Abdominal: Soft. Bowel sounds are normal. She exhibits no distension, no abdominal bruit and no mass. There is no tenderness.   Musculoskeletal: She exhibits no edema or deformity.   Lymphadenopathy:     She has no cervical adenopathy.   Neurological: She is alert and oriented to person, place, and time. No cranial nerve deficit.   Skin: Skin is warm and dry. No rash noted. She is not diaphoretic. No cyanosis. No pallor. Nails show no clubbing.   Psychiatric: She has a normal mood and affect. Judgment normal.   Vitals reviewed.      Lab Review:       Assessment:      Diagnosis Plan   1. Mitral valve insufficiency, unspecified etiology  Adult Transthoracic Echo Complete W/ Cont if Necessary Per Protocol   2. Billowing mitral valve  Adult Transthoracic Echo Complete W/ Cont if Necessary Per Protocol   3. Bronchiectasis without complication (CMS/HCC)     4. Chronic obstructive pulmonary disease, unspecified COPD type (CMS/HCC)     5. Breath shortness     6. Chronic adrenal insufficiency (CMS/HCC)     7. Lymphedema       1. Bronchiectasis with COPD and bronchospasm, follows with Dr. Hudson.  Is on chronic steroids for this.  2. Adrenal insufficiency, on chronic steroids.  3. Murmur on examination, history of mitral insufficiency, repeat echo.  4. Chronic lower extremity lymphedema.  5. History of intermittent PVCs.     Plan:       See Tequila in 6 months, no medication changes.  Set up echocardiogram.

## 2020-06-18 ENCOUNTER — HOSPITAL ENCOUNTER (OUTPATIENT)
Dept: CARDIOLOGY | Facility: HOSPITAL | Age: 85
Discharge: HOME | End: 2020-06-18
Admitting: INTERNAL MEDICINE

## 2020-06-18 VITALS — BODY MASS INDEX: 18.31 KG/M2 | WEIGHT: 93.25 LBS | HEIGHT: 60 IN | HEART RATE: 88 BPM

## 2020-06-18 DIAGNOSIS — I34.0 MITRAL VALVE INSUFFICIENCY, UNSPECIFIED ETIOLOGY: ICD-10-CM

## 2020-06-18 DIAGNOSIS — I34.1 BILLOWING MITRAL VALVE: ICD-10-CM

## 2020-06-18 LAB
ASCENDING AORTA: 2.8 CM
BH CV ECHO MEAS - ACS: 1.2 CM
BH CV ECHO MEAS - AO MAX PG (FULL): 4.4 MMHG
BH CV ECHO MEAS - AO MAX PG: 9.2 MMHG
BH CV ECHO MEAS - AO MEAN PG (FULL): 2.8 MMHG
BH CV ECHO MEAS - AO MEAN PG: 5.7 MMHG
BH CV ECHO MEAS - AO V2 MAX: 152 CM/SEC
BH CV ECHO MEAS - AO V2 MEAN: 112.3 CM/SEC
BH CV ECHO MEAS - AO V2 VTI: 42.3 CM
BH CV ECHO MEAS - ASC AORTA: 2.8 CM
BH CV ECHO MEAS - AVA(I,A): 1.8 CM^2
BH CV ECHO MEAS - AVA(I,D): 1.8 CM^2
BH CV ECHO MEAS - AVA(V,A): 1.8 CM^2
BH CV ECHO MEAS - AVA(V,D): 1.8 CM^2
BH CV ECHO MEAS - BSA(HAYCOCK): 1.3 M^2
BH CV ECHO MEAS - BSA: 1.3 M^2
BH CV ECHO MEAS - BZI_BMI: 18.2 KILOGRAMS/M^2
BH CV ECHO MEAS - BZI_METRIC_HEIGHT: 152.4 CM
BH CV ECHO MEAS - BZI_METRIC_WEIGHT: 42.2 KG
BH CV ECHO MEAS - EDV(MOD-SP2): 34 ML
BH CV ECHO MEAS - EDV(MOD-SP4): 39 ML
BH CV ECHO MEAS - EDV(TEICH): 49.7 ML
BH CV ECHO MEAS - EF(CUBED): 72.2 %
BH CV ECHO MEAS - EF(MOD-BP): 67.4 %
BH CV ECHO MEAS - EF(MOD-SP2): 67.6 %
BH CV ECHO MEAS - EF(MOD-SP4): 64.1 %
BH CV ECHO MEAS - EF(TEICH): 65.1 %
BH CV ECHO MEAS - ESV(MOD-SP2): 11 ML
BH CV ECHO MEAS - ESV(MOD-SP4): 14 ML
BH CV ECHO MEAS - ESV(TEICH): 17.3 ML
BH CV ECHO MEAS - FS: 34.8 %
BH CV ECHO MEAS - IVS/LVPW: 1.1
BH CV ECHO MEAS - IVSD: 1.3 CM
BH CV ECHO MEAS - LAT PEAK E' VEL: 7.8 CM/SEC
BH CV ECHO MEAS - LV DIASTOLIC VOL/BSA (35-75): 28.9 ML/M^2
BH CV ECHO MEAS - LV MASS(C)D: 140.6 GRAMS
BH CV ECHO MEAS - LV MASS(C)DI: 104.3 GRAMS/M^2
BH CV ECHO MEAS - LV MAX PG: 4.9 MMHG
BH CV ECHO MEAS - LV MEAN PG: 2.9 MMHG
BH CV ECHO MEAS - LV SYSTOLIC VOL/BSA (12-30): 10.4 ML/M^2
BH CV ECHO MEAS - LV V1 MAX: 110.3 CM/SEC
BH CV ECHO MEAS - LV V1 MEAN: 80.8 CM/SEC
BH CV ECHO MEAS - LV V1 VTI: 30.2 CM
BH CV ECHO MEAS - LVIDD: 3.5 CM
BH CV ECHO MEAS - LVIDS: 2.3 CM
BH CV ECHO MEAS - LVLD AP2: 6.1 CM
BH CV ECHO MEAS - LVLD AP4: 7 CM
BH CV ECHO MEAS - LVLS AP2: 5.6 CM
BH CV ECHO MEAS - LVLS AP4: 6 CM
BH CV ECHO MEAS - LVOT AREA (M): 2.5 CM^2
BH CV ECHO MEAS - LVOT AREA: 2.5 CM^2
BH CV ECHO MEAS - LVOT DIAM: 1.8 CM
BH CV ECHO MEAS - LVPWD: 1.2 CM
BH CV ECHO MEAS - MED PEAK E' VEL: 6.6 CM/SEC
BH CV ECHO MEAS - MR MAX PG: 73.5 MMHG
BH CV ECHO MEAS - MR MAX VEL: 428.6 CM/SEC
BH CV ECHO MEAS - MV A DUR: 0.13 SEC
BH CV ECHO MEAS - MV A MAX VEL: 151.5 CM/SEC
BH CV ECHO MEAS - MV DEC SLOPE: 266.4 CM/SEC^2
BH CV ECHO MEAS - MV DEC TIME: 0.33 SEC
BH CV ECHO MEAS - MV E MAX VEL: 91.3 CM/SEC
BH CV ECHO MEAS - MV E/A: 0.6
BH CV ECHO MEAS - MV MAX PG: 13.9 MMHG
BH CV ECHO MEAS - MV MEAN PG: 5.2 MMHG
BH CV ECHO MEAS - MV P1/2T MAX VEL: 92 CM/SEC
BH CV ECHO MEAS - MV P1/2T: 101.2 MSEC
BH CV ECHO MEAS - MV V2 MAX: 186.7 CM/SEC
BH CV ECHO MEAS - MV V2 MEAN: 104.5 CM/SEC
BH CV ECHO MEAS - MV V2 VTI: 33.8 CM
BH CV ECHO MEAS - MVA P1/2T LCG: 2.4 CM^2
BH CV ECHO MEAS - MVA(P1/2T): 2.2 CM^2
BH CV ECHO MEAS - MVA(VTI): 2.2 CM^2
BH CV ECHO MEAS - PA MAX PG (FULL): 2.4 MMHG
BH CV ECHO MEAS - PA MAX PG: 4.4 MMHG
BH CV ECHO MEAS - PA V2 MAX: 104.5 CM/SEC
BH CV ECHO MEAS - PULM A REVS DUR: 0.1 SEC
BH CV ECHO MEAS - PULM A REVS VEL: 46.3 CM/SEC
BH CV ECHO MEAS - PULM DIAS VEL: 43.7 CM/SEC
BH CV ECHO MEAS - PULM S/D: 1.4
BH CV ECHO MEAS - PULM SYS VEL: 59.1 CM/SEC
BH CV ECHO MEAS - PVA(V,A): 2.1 CM^2
BH CV ECHO MEAS - PVA(V,D): 2.1 CM^2
BH CV ECHO MEAS - QP/QS: 0.71
BH CV ECHO MEAS - RV BASE (<4.1) - OBSOLETE: 3.2 CM
BH CV ECHO MEAS - RV LENGTH (<8.5) - OBSOLETE: 6.7 CM
BH CV ECHO MEAS - RV MAX PG: 2 MMHG
BH CV ECHO MEAS - RV MEAN PG: 1.1 MMHG
BH CV ECHO MEAS - RV V1 MAX: 70.7 CM/SEC
BH CV ECHO MEAS - RV V1 MEAN: 47.1 CM/SEC
BH CV ECHO MEAS - RV V1 VTI: 17.2 CM
BH CV ECHO MEAS - RVOT AREA: 3.1 CM^2
BH CV ECHO MEAS - RVOT DIAM: 2 CM
BH CV ECHO MEAS - SI(CUBED): 22.3 ML/M^2
BH CV ECHO MEAS - SI(LVOT): 55.6 ML/M^2
BH CV ECHO MEAS - SI(MOD-SP2): 17.1 ML/M^2
BH CV ECHO MEAS - SI(MOD-SP4): 18.5 ML/M^2
BH CV ECHO MEAS - SI(TEICH): 24 ML/M^2
BH CV ECHO MEAS - SV(CUBED): 30.1 ML
BH CV ECHO MEAS - SV(LVOT): 74.9 ML
BH CV ECHO MEAS - SV(MOD-SP2): 23 ML
BH CV ECHO MEAS - SV(MOD-SP4): 25 ML
BH CV ECHO MEAS - SV(RVOT): 52.9 ML
BH CV ECHO MEAS - SV(TEICH): 32.3 ML
BH CV ECHO MEAS - TAPSE (>1.6): 2.1 CM2
BH CV ECHO MEASUREMENTS AVERAGE E/E' RATIO: 12.68
BH CV XLRA - RV BASE: 3.2 CM
BH CV XLRA - RV LENGTH: 6.7 CM
BH CV XLRA - RV MID: 2.2 CM
BH CV XLRA - TDI S': 13 CM/SEC
LEFT ATRIUM VOLUME INDEX: 20 ML/M2
MAXIMAL PREDICTED HEART RATE: 136 BPM
SINUS: 2.6 CM
STJ: 2.2 CM
STRESS TARGET HR: 116 BPM

## 2020-06-18 PROCEDURE — 93306 TTE W/DOPPLER COMPLETE: CPT | Performed by: INTERNAL MEDICINE

## 2020-06-18 PROCEDURE — 93306 TTE W/DOPPLER COMPLETE: CPT

## 2020-06-19 ENCOUNTER — TELEPHONE (OUTPATIENT)
Dept: FAMILY MEDICINE CLINIC | Facility: CLINIC | Age: 85
End: 2020-06-19

## 2020-06-19 DIAGNOSIS — M41.9 ACQUIRED KYPHOSCOLIOSIS: ICD-10-CM

## 2020-06-19 RX ORDER — HYDROCODONE BITARTRATE AND ACETAMINOPHEN 5; 325 MG/1; MG/1
1 TABLET ORAL EVERY 4 HOURS PRN
Qty: 30 TABLET | Refills: 0 | Status: SHIPPED | OUTPATIENT
Start: 2020-06-19 | End: 2020-09-03 | Stop reason: SDUPTHER

## 2020-06-19 RX ORDER — HYDROCODONE BITARTRATE AND ACETAMINOPHEN 5; 325 MG/1; MG/1
1 TABLET ORAL EVERY 4 HOURS PRN
Qty: 30 TABLET | Refills: 0 | Status: CANCELLED | OUTPATIENT
Start: 2020-06-19

## 2020-06-19 NOTE — TELEPHONE ENCOUNTER
PATIENT CALLED IN TO REQUEST A PRESCRIPTION FOR HYDROcodone-acetaminophen (NORCO) 5-325 MG per tablet   ONE TABLET MY MOUTH EVERY 4 HOURS AS NEEDED FOR MODERATE OR SEVERE PAIN .  QUANTITY OF 30 . PATIENT REQUESTS REFILLS  SHE WONT SEE  DOCTOR CHAN UNTIL September .  SENT TO [facility] #[id number] [place], [place] - [address]  AT [facility] [address] - [phone]  - [phone] FX  [phone] . PATIENT CALL BACK  [phone] .

## 2020-09-03 ENCOUNTER — OFFICE VISIT (OUTPATIENT)
Dept: FAMILY MEDICINE CLINIC | Facility: CLINIC | Age: 85
End: 2020-09-03

## 2020-09-03 VITALS
HEART RATE: 92 BPM | OXYGEN SATURATION: 97 % | SYSTOLIC BLOOD PRESSURE: 102 MMHG | DIASTOLIC BLOOD PRESSURE: 78 MMHG | RESPIRATION RATE: 18 BRPM | BODY MASS INDEX: 17.98 KG/M2 | TEMPERATURE: 97.6 F | HEIGHT: 60 IN | WEIGHT: 91.6 LBS

## 2020-09-03 DIAGNOSIS — Z00.00 MEDICARE ANNUAL WELLNESS VISIT, INITIAL: Primary | ICD-10-CM

## 2020-09-03 DIAGNOSIS — M81.0 AGE-RELATED OSTEOPOROSIS WITHOUT CURRENT PATHOLOGICAL FRACTURE: ICD-10-CM

## 2020-09-03 DIAGNOSIS — E27.40 CHRONIC ADRENAL INSUFFICIENCY (HCC): ICD-10-CM

## 2020-09-03 DIAGNOSIS — E03.9 ACQUIRED HYPOTHYROIDISM: ICD-10-CM

## 2020-09-03 DIAGNOSIS — M41.9 ACQUIRED KYPHOSCOLIOSIS: ICD-10-CM

## 2020-09-03 DIAGNOSIS — J44.9 CHRONIC OBSTRUCTIVE PULMONARY DISEASE, UNSPECIFIED COPD TYPE (HCC): ICD-10-CM

## 2020-09-03 DIAGNOSIS — E78.5 HYPERLIPIDEMIA, UNSPECIFIED HYPERLIPIDEMIA TYPE: ICD-10-CM

## 2020-09-03 DIAGNOSIS — Z79.899 HIGH RISK MEDICATION USE: ICD-10-CM

## 2020-09-03 PROCEDURE — G0438 PPPS, INITIAL VISIT: HCPCS | Performed by: FAMILY MEDICINE

## 2020-09-03 RX ORDER — HYDROCODONE BITARTRATE AND ACETAMINOPHEN 5; 325 MG/1; MG/1
1 TABLET ORAL EVERY 4 HOURS PRN
Qty: 30 TABLET | Refills: 0 | Status: SHIPPED | OUTPATIENT
Start: 2020-09-03 | End: 2020-12-09 | Stop reason: SDUPTHER

## 2020-09-03 NOTE — PROGRESS NOTES
The ABCs of the Annual Wellness Visit  Initial Medicare Wellness Visit    Chief Complaint   Patient presents with   • Medicare Wellness-Initial Visit       Subjective   History of Present Illness:  Gely Carlisle is a 84 y.o. female who presents for an Initial Medicare Wellness Visit.    HEALTH RISK ASSESSMENT    Recent Hospitalizations:  No hospitalization(s) within the last year.    Current Medical Providers:  Patient Care Team:  Arnaud Paredes MD as PCP - General (Family Medicine)  Steve Tierney MD as PCP - Claims Attributed  Self, Camryn BALLARD MD as Consulting Physician (Endocrinology)  Jennifer Brown MD as Consulting Physician (Cardiology)    Smoking Status:  Social History     Tobacco Use   Smoking Status Passive Smoke Exposure - Never Smoker   Smokeless Tobacco Never Used   Tobacco Comment    CAFFEINE USE       Alcohol Consumption:  Social History     Substance and Sexual Activity   Alcohol Use No       Depression Screen:   PHQ-2/PHQ-9 Depression Screening 9/3/2020   Little interest or pleasure in doing things 0   Feeling down, depressed, or hopeless 0   Trouble falling or staying asleep, or sleeping too much 0   Feeling tired or having little energy 0   Poor appetite or overeating 0   Feeling bad about yourself - or that you are a failure or have let yourself or your family down 0   Trouble concentrating on things, such as reading the newspaper or watching television 0   Moving or speaking so slowly that other people could have noticed. Or the opposite - being so fidgety or restless that you have been moving around a lot more than usual 0   Thoughts that you would be better off dead, or of hurting yourself in some way 0   Total Score 0   If you checked off any problems, how difficult have these problems made it for you to do your work, take care of things at home, or get along with other people? Not difficult at all       Fall Risk Screen:  STEADI Fall Risk Assessment was completed, and patient  is at LOW risk for falls.Assessment completed on:9/3/2020    Health Habits and Functional and Cognitive Screening:  Functional & Cognitive Status 9/3/2020   Do you have difficulty preparing food and eating? No   Do you have difficulty bathing yourself, getting dressed or grooming yourself? No   Do you have difficulty using the toilet? No   Do you have difficulty moving around from place to place? No   Do you have trouble with steps or getting out of a bed or a chair? No   Current Diet Well Balanced Diet   Dental Exam Up to date   Eye Exam Up to date   Exercise (times per week) 0 times per week   Current Exercise Activities Include None   Do you need help using the phone?  No   Are you deaf or do you have serious difficulty hearing?  No   Do you need help with transportation? Yes   Do you need help shopping? No   Do you need help preparing meals?  No   Do you need help with housework?  Yes   Do you need help with laundry? No   Do you need help taking your medications? No   Do you need help managing money? No   Do you ever drive or ride in a car without wearing a seat belt? No   Have you felt unusual stress, anger or loneliness in the last month? No   Who do you live with? Alone   If you need help, do you have trouble finding someone available to you? No   Have you been bothered in the last four weeks by sexual problems? No   Do you have difficulty concentrating, remembering or making decisions? No         Does the patient have evidence of cognitive impairment? No    Asprin use counseling:Does not need ASA (and currently is not on it)    Age-appropriate Screening Schedule:  Refer to the list below for future screening recommendations based on patient's age, sex and/or medical conditions. Orders for these recommended tests are listed in the plan section. The patient has been provided with a written plan.    Health Maintenance   Topic Date Due   • ZOSTER VACCINE (1 of 2) 09/05/1985   • DXA SCAN  08/10/2016   • MAMMOGRAM   10/04/2019   • INFLUENZA VACCINE  08/01/2020   • HEMOGLOBIN A1C  12/02/2020   • LIPID PANEL  06/02/2021   • URINE MICROALBUMIN  06/02/2021   • DIABETIC EYE EXAM  06/15/2021   • TDAP/TD VACCINES (2 - Tdap) 09/17/2026          The following portions of the patient's history were reviewed and updated as appropriate: past family history, past social history and past surgical history.    Outpatient Medications Prior to Visit   Medication Sig Dispense Refill   • ADVAIR DISKUS 250-50 MCG/DOSE DISKUS INHALE 1 PUFF BID  11   • albuterol (PROVENTIL) (2.5 MG/3ML) 0.083% nebulizer solution Take 2.5 mg by nebulization every 4 (four) hours as needed for wheezing.     • calcium carbonate (OS-RADHA) 600 MG tablet Take 600 mg by mouth 2 (two) times a day with meals.     • cetirizine (ZyrTEC) 10 MG tablet Take 10 mg by mouth Daily.     • CONTOUR NEXT TEST test strip TEST D  12   • Cranberry 1000 MG capsule Take 1 tablet by mouth Daily.     • Cyanocobalamin (VITAMIN B12 SL) Place 1 tablet under the tongue Daily.     • Glucosamine-Chondroitin (GLUCOSAMINE CHONDROITIN COMPLX) 500-250 MG capsule Take 3 capsules by mouth 2 (Two) Times a Day.     • guaiFENesin (MUCINEX) 600 MG 12 hr tablet Take 1,200 mg by mouth 2 (two) times a day.     • HYDROcodone-acetaminophen (NORCO) 5-325 MG per tablet Take 1 tablet by mouth Every 4 (Four) Hours As Needed for Moderate Pain  or Severe Pain . 30 tablet 0   • levothyroxine (SYNTHROID, LEVOTHROID) 75 MCG tablet Take 75 mcg by mouth daily.     • montelukast (SINGULAIR) 10 MG tablet Take 10 mg by mouth every night.     • THOMAS-POLYCIN [id number] ophthalmic ointment APPLY 1/4 INCH STRIP INTO OU BID FOR 2 WEEKS  2   • Probiotic Product (VSL#3 DS PO) Take 1 capsule by mouth Daily with lunch and dinner.     • risedronate (ACTONEL) 35 MG tablet Take 35 mg by mouth Every 7 (Seven) Days. with water on empty stomach, nothing by mouth or lie down for next 30 minutes. Takes on Monday.     • rosuvastatin (CRESTOR) 20  MG tablet Take  by mouth Daily.  6   • sodium chloride 7 % nebulizer solution nebulizer solution USE 1 VIAL PER NEBULIZER TWICE A DAY  3   • tiotropium (SPIRIVA) 18 MCG per inhalation capsule Place 1 capsule into inhaler and inhale 1 (one) time daily. Patient takes the handi mist     • predniSONE (DELTASONE) 10 MG tablet Take 40 mg by mouth Daily.     • ciprofloxacin (CIPRO) 500 MG tablet Take 500 mg by mouth 2 (Two) Times a Day.     • Multiple Vitamins-Minerals (MULTIVITAMIN ADULT PO) Take 1 tablet by mouth Daily.     • nystatin (MYCOSTATIN) 360720 UNIT/ML suspension Swish and swallow 5 mL 4 (Four) Times a Day. 240 mL 2   • vitamin C (ASCORBIC ACID) 250 MG tablet Take 500 mg by mouth daily.       No facility-administered medications prior to visit.        Patient Active Problem List   Diagnosis   • MI (mitral incompetence)   • Billowing mitral valve   • Breath shortness   • Hypokalemia   • Bronchiectasis - on chronic steroid therapy   • Chronic adrenal insufficiency (CMS/HCC)   • COPD (chronic obstructive pulmonary disease) (CMS/HCC)   • Lymphedema   • Osteoporosis   • Recurrent UTI   • Knee effusion, left   • Pseudogout of left knee   • Leukocytosis   • Edema of lower extremity   • Acquired hypothyroidism   • Uncomplicated asthma       Advanced Care Planning:  ACP discussion was held with the patient during this visit. Patient has an advance directive in EMR which is still valid.     Review of Systems    Compared to one year ago, the patient feels her physical health is the same.  Compared to one year ago, the patient feels her mental health is the same.    Reviewed chart for potential of high risk medication in the elderly: no  Reviewed chart for potential of harmful drug interactions in the elderly:no    Objective         Vitals:    09/03/20 0929   BP: 102/78   BP Location: Right arm   Patient Position: Sitting   Cuff Size: Adult   Pulse: 92   Resp: 18   Temp: 97.6 °F (36.4 °C)   SpO2: 97%   Weight: 41.5 kg (91  "lb 9.6 oz)   Height: 152.4 cm (60\")       Body mass index is 17.89 kg/m².  Discussed the patient's BMI with her. The BMI is below average; BMI management plan is completed.    Physical Exam          Assessment/Plan   Medicare Risks and Personalized Health Plan  CMS Preventative Services Quick Reference  diabetes    The above risks/problems have been discussed with the patient.  Pertinent information has been shared with the patient in the After Visit Summary.  Follow up plans and orders are seen below in the Assessment/Plan Section.    There are no diagnoses linked to this encounter.  Follow Up:  Patient here for initial Medicare wellness evaluation.  Followed by other specialists including endocrinologist every 6 months.  Most recent lab reviewed and unremarkable except for possible excess thyroid supplement and apparently endocrinologist decreased supplement to 6 days a week.  Will recheck level.  Patient does have appointment with endocrinologist in 3 months.  Also followed by cardiologist and recently found to have PVCs.  Benign nature discussed.  Did have echocardiogram that was reviewed and basically unremarkable.  Also patient recently had skin cancers removed, Mohs therapy including a large lesion on the head.  Apparently despite cortisone therapy for adrenal insufficiency eventually healed.  Overall status is stable though do encourage weight gain if possible.  Healthy diet strongly recommended.  Patient reports followed by gynecologist regarding routine mammography and bone density testing in the GYN office.  Do recommend monthly self breast exam.  Also has chronic pulmonary condition followed by pulmonary specialist and is soon pneumonia vaccines have been updated but unfortunately with current computer system cannot be verified?    This note includes information entered using a voice recognition dictation system.  Though reviewed, some nonsensible errors may remain.      An After Visit Summary and PPPS " were given to the patient.

## 2020-09-04 LAB
BUN SERPL-MCNC: 17 MG/DL (ref 8–23)
BUN/CREAT SERPL: 30.9 (ref 7–25)
CALCIUM SERPL-MCNC: 9.3 MG/DL (ref 8.6–10.5)
CHLORIDE SERPL-SCNC: 100 MMOL/L (ref 98–107)
CO2 SERPL-SCNC: 32.3 MMOL/L (ref 22–29)
CREAT SERPL-MCNC: 0.55 MG/DL (ref 0.57–1)
GLUCOSE SERPL-MCNC: 76 MG/DL (ref 65–99)
POTASSIUM SERPL-SCNC: 3.9 MMOL/L (ref 3.5–5.2)
SODIUM SERPL-SCNC: 141 MMOL/L (ref 136–145)
T4 FREE SERPL-MCNC: 1.84 NG/DL (ref 0.93–1.7)
TSH SERPL DL<=0.005 MIU/L-ACNC: 5.76 UIU/ML (ref 0.27–4.2)

## 2020-09-17 ENCOUNTER — TELEPHONE (OUTPATIENT)
Dept: FAMILY MEDICINE CLINIC | Facility: CLINIC | Age: 85
End: 2020-09-17

## 2020-09-17 NOTE — TELEPHONE ENCOUNTER
Patient called and requested a copy of latest lab results mailed to her     Please advise     [phone]

## 2020-09-17 NOTE — TELEPHONE ENCOUNTER
MAILING LABS TODAY.  UNABLE TO LEAVE MSG DUE TO VOICEMAIL BEING FULL    Hub please inform patient

## 2020-10-06 ENCOUNTER — TELEPHONE (OUTPATIENT)
Dept: FAMILY MEDICINE CLINIC | Facility: CLINIC | Age: 85
End: 2020-10-06

## 2020-10-06 NOTE — TELEPHONE ENCOUNTER
Spoke with patient and she said that she did her lab results in the mail but it took 3 weeks but her main questions are the labs that are high on her results she is wanting to talk to you about it.

## 2020-10-06 NOTE — TELEPHONE ENCOUNTER
Patient called in and stated she wanted to know why she didn't get a letter sent to her with the labs . Patient has a couple of questions about the labs as well .       Please call her and advise at [phone]

## 2020-10-12 ENCOUNTER — FLU SHOT (OUTPATIENT)
Dept: FAMILY MEDICINE CLINIC | Facility: CLINIC | Age: 85
End: 2020-10-12

## 2020-10-12 DIAGNOSIS — Z23 NEED FOR IMMUNIZATION AGAINST INFLUENZA: Primary | ICD-10-CM

## 2020-10-12 PROCEDURE — G0008 ADMIN INFLUENZA VIRUS VAC: HCPCS | Performed by: FAMILY MEDICINE

## 2020-10-12 PROCEDURE — 90694 VACC AIIV4 NO PRSRV 0.5ML IM: CPT | Performed by: FAMILY MEDICINE

## 2020-12-01 ENCOUNTER — TELEPHONE (OUTPATIENT)
Dept: FAMILY MEDICINE CLINIC | Facility: CLINIC | Age: 85
End: 2020-12-01

## 2020-12-01 NOTE — TELEPHONE ENCOUNTER
Caller: Gely Carlisle    Relationship: Self    Best call back number: [phone]    PATIENT CALLED TO CHECK IF HER LAB ORDERS HAVE BEEN RECEIVED BY THE OFFICE.     THE ORDER IS FROM DR. NICHOLAS ENCARNACION, [address], [address] [place] [address]  [phone]    THE PATIENT HAS AN APPOINTMENT TO FOLLOW UP WITH THE ENDOCRINOLOGY OFFICE AND WANTS TO BE ABLE TO GET THIS BLOOD WORK DONE BEFORE THAT VISIT.     PATIENT WOULD LIKE A CALLBACK TO SCHEDULE.

## 2020-12-02 ENCOUNTER — TELEPHONE (OUTPATIENT)
Dept: FAMILY MEDICINE CLINIC | Facility: CLINIC | Age: 85
End: 2020-12-02

## 2020-12-02 NOTE — TELEPHONE ENCOUNTER
CALLED PT TO SET UP LAB APPOINTMENT, SHE WILL CALL ME BACK ONCE SHE CALLS HER DAUGHTER TO SET UP DAY AND TIME.

## 2020-12-07 DIAGNOSIS — E78.5 HYPERLIPIDEMIA, UNSPECIFIED HYPERLIPIDEMIA TYPE: ICD-10-CM

## 2020-12-07 DIAGNOSIS — E55.9 VITAMIN D DEFICIENCY: ICD-10-CM

## 2020-12-07 DIAGNOSIS — Z79.899 HIGH RISK MEDICATION USE: ICD-10-CM

## 2020-12-07 DIAGNOSIS — E11.65 UNCONTROLLED TYPE 2 DIABETES MELLITUS WITH HYPERGLYCEMIA (HCC): Primary | ICD-10-CM

## 2020-12-07 DIAGNOSIS — E03.8 SECONDARY HYPOTHYROIDISM: ICD-10-CM

## 2020-12-07 DIAGNOSIS — I10 ESSENTIAL HYPERTENSION: ICD-10-CM

## 2020-12-09 ENCOUNTER — TELEPHONE (OUTPATIENT)
Dept: FAMILY MEDICINE CLINIC | Facility: CLINIC | Age: 85
End: 2020-12-09

## 2020-12-09 DIAGNOSIS — M41.9 ACQUIRED KYPHOSCOLIOSIS: ICD-10-CM

## 2020-12-09 LAB
1,25(OH)2D SERPL-MCNC: 43.5 PG/ML (ref 19.9–79.3)
ALBUMIN SERPL-MCNC: 3.7 G/DL (ref 3.5–5.2)
ALBUMIN/GLOB SERPL: 1.4 G/DL
ALP SERPL-CCNC: 63 U/L (ref 39–117)
ALT SERPL-CCNC: 18 U/L (ref 1–33)
AST SERPL-CCNC: 21 U/L (ref 1–32)
BASOPHILS # BLD AUTO: 0.05 10*3/MM3 (ref 0–0.2)
BASOPHILS NFR BLD AUTO: 0.4 % (ref 0–1.5)
BILIRUB SERPL-MCNC: 0.2 MG/DL (ref 0–1.2)
BUN SERPL-MCNC: 27 MG/DL (ref 8–23)
BUN/CREAT SERPL: 48.2 (ref 7–25)
CALCIUM SERPL-MCNC: 9.1 MG/DL (ref 8.6–10.5)
CHLORIDE SERPL-SCNC: 102 MMOL/L (ref 98–107)
CHOLEST SERPL-MCNC: 229 MG/DL (ref 0–200)
CO2 SERPL-SCNC: 30.8 MMOL/L (ref 22–29)
CREAT SERPL-MCNC: 0.56 MG/DL (ref 0.57–1)
EOSINOPHIL # BLD AUTO: 0.09 10*3/MM3 (ref 0–0.4)
EOSINOPHIL NFR BLD AUTO: 0.6 % (ref 0.3–6.2)
ERYTHROCYTE [DISTWIDTH] IN BLOOD BY AUTOMATED COUNT: 13.5 % (ref 12.3–15.4)
GLOBULIN SER CALC-MCNC: 2.7 GM/DL
GLUCOSE SERPL-MCNC: 91 MG/DL (ref 65–99)
HBA1C MFR BLD: 6.2 % (ref 4.8–5.6)
HCT VFR BLD AUTO: 40.7 % (ref 34–46.6)
HDLC SERPL-MCNC: 84 MG/DL (ref 40–60)
HGB BLD-MCNC: 13.1 G/DL (ref 12–15.9)
IMM GRANULOCYTES # BLD AUTO: 0.1 10*3/MM3 (ref 0–0.05)
IMM GRANULOCYTES NFR BLD AUTO: 0.7 % (ref 0–0.5)
LDLC SERPL CALC-MCNC: 132 MG/DL (ref 0–100)
LYMPHOCYTES # BLD AUTO: 2.51 10*3/MM3 (ref 0.7–3.1)
LYMPHOCYTES NFR BLD AUTO: 18 % (ref 19.6–45.3)
MCH RBC QN AUTO: 27.8 PG (ref 26.6–33)
MCHC RBC AUTO-ENTMCNC: 32.2 G/DL (ref 31.5–35.7)
MCV RBC AUTO: 86.4 FL (ref 79–97)
MONOCYTES # BLD AUTO: 1.61 10*3/MM3 (ref 0.1–0.9)
MONOCYTES NFR BLD AUTO: 11.6 % (ref 5–12)
NEUTROPHILS # BLD AUTO: 9.56 10*3/MM3 (ref 1.7–7)
NEUTROPHILS NFR BLD AUTO: 68.7 % (ref 42.7–76)
NRBC BLD AUTO-RTO: 0 /100 WBC (ref 0–0.2)
PLATELET # BLD AUTO: 290 10*3/MM3 (ref 140–450)
POTASSIUM SERPL-SCNC: 4.4 MMOL/L (ref 3.5–5.2)
PROT SERPL-MCNC: 6.4 G/DL (ref 6–8.5)
RBC # BLD AUTO: 4.71 10*6/MM3 (ref 3.77–5.28)
SODIUM SERPL-SCNC: 141 MMOL/L (ref 136–145)
T4 FREE SERPL-MCNC: 1.78 NG/DL (ref 0.93–1.7)
TRIGL SERPL-MCNC: 74 MG/DL (ref 0–150)
TSH SERPL DL<=0.005 MIU/L-ACNC: 7.27 UIU/ML (ref 0.27–4.2)
VLDLC SERPL CALC-MCNC: 13 MG/DL (ref 5–40)
WBC # BLD AUTO: 13.92 10*3/MM3 (ref 3.4–10.8)

## 2020-12-09 RX ORDER — HYDROCODONE BITARTRATE AND ACETAMINOPHEN 5; 325 MG/1; MG/1
1 TABLET ORAL EVERY 4 HOURS PRN
Qty: 30 TABLET | Refills: 0 | Status: SHIPPED | OUTPATIENT
Start: 2020-12-09 | End: 2021-04-06 | Stop reason: SDUPTHER

## 2020-12-10 ENCOUNTER — OFFICE VISIT (OUTPATIENT)
Dept: CARDIOLOGY | Facility: CLINIC | Age: 85
End: 2020-12-10

## 2020-12-10 VITALS
WEIGHT: 91.4 LBS | BODY MASS INDEX: 17.94 KG/M2 | SYSTOLIC BLOOD PRESSURE: 120 MMHG | HEART RATE: 79 BPM | HEIGHT: 60 IN | DIASTOLIC BLOOD PRESSURE: 70 MMHG

## 2020-12-10 DIAGNOSIS — I34.0 NONRHEUMATIC MITRAL VALVE REGURGITATION: Primary | ICD-10-CM

## 2020-12-10 DIAGNOSIS — I49.3 PVC'S (PREMATURE VENTRICULAR CONTRACTIONS): ICD-10-CM

## 2020-12-10 DIAGNOSIS — I34.1 BILLOWING MITRAL VALVE: ICD-10-CM

## 2020-12-10 PROCEDURE — 93000 ELECTROCARDIOGRAM COMPLETE: CPT | Performed by: INTERNAL MEDICINE

## 2020-12-10 PROCEDURE — 99214 OFFICE O/P EST MOD 30 MIN: CPT | Performed by: INTERNAL MEDICINE

## 2020-12-10 NOTE — PROGRESS NOTES
Date of Office Visit: 12/10/2020  Encounter Provider: Jennifer Brown MD  Place of Service: [facility] CARDIOLOGY  Patient Name: Gely Carlisle  :1935      Patient ID:  Gely Carlisle is a 85 y.o. female is here for  followup for mitral insufficiency        History of Present Illness    She has a history of mitral valve prolapse and mitral regurgitation. She had bronchiectasis with chronic lung infections.  She also has asthma and COPD.  She has adrenal insufficiency due to chronic steroid use.  She has diabetes mellitus type 2, gout, thyroid disease, history of cervical and skin cancer.  She has hyperlipidemia.  She had vascular screening done 2017 which showed normal abdominal aorta, normal carotid arteries.  ABIs could not be obtained.     She has previously followed with Dr. Montemayor.  This is her first visit with me.     She is , has 3 children retired.  She uses no cigarettes, alcohol or drugs.  She has 2 cups of coffee per day.  Her daughter has hypertension and her mother had heart disease.     She follows with Dr. Hudson for her bronchiectasis and history of Pseudomonas infections.  She also COPD.  She has historically been on chronic steroids which she is now weaned off of.    Labs on 2020 showed normal CMP, hemoglobin A1c 6.2, TSH 7.27 elevated, free T4 elevated 1.78, HDL 84, total cholesterol 229, , triglycerides 74, white count 13.92, otherwise normal CBC.  Echo done 2020 shows mild to moderate left ventricular hypertrophy with mild to moderate mitral deficiency, calcification of the aortic valve, ejection fraction 67% with grade 1 diastolic dysfunction, normal chamber sizes.    Her ECG today shows new PVCs which she is not had before.  She has noticed more difficulty breathing and fatigue.  She has episodes of weakness but no syncope and no lightheadedness.  She does have to lay down when she feels weak.  She does feel her heart  pounding but not racing.  She has had no recent changes in her medications.  She follows with Dr. Triana for for her thyroid and adrenal insufficiency.  She takes Medrol 4 mg daily for her adrenal insufficiency.  Her daughter says that she seems to be sleeping more and does not seem to have as much energy.  She notices no exertional chest tightness or pressure.  She has chronic exertional dyspnea and that does seem to be slightly worse.    Past Medical History:   Diagnosis Date   • Asthma    • Bronchiectasis (CMS/HCC)    • Cancer (CMS/HCC)     cervical and skin   • Colitis    • COPD (chronic obstructive pulmonary disease) (CMS/HCC)    • Diabetes mellitus (CMS/HCC)     hyperglycemia   • Disease of thyroid gland     hypothyroidism   • History of transfusion    • Hyperlipidemia    • Insufficiency, adrenal (CMS/HCC)    • Leukocytosis    • Lymphedema    • MR (mitral regurgitation)    • MVP (mitral valve prolapse)    • Osteoarthritis    • Osteoporosis    • Scoliosis    • Urinary tract infection          Past Surgical History:   Procedure Laterality Date   • CATARACT EXTRACTION     • CHOLECYSTECTOMY      WITH COMMON BILE DUCT EXPLORATION REMOVAL OF STONE   • COLON SURGERY  1983    RUPTURED DIVERTICULI  DYLAN WORLEY MD   • HERNIA REPAIR  2012    ABDOMINAL LEFT UPPER   • HYSTERECTOMY     • HYSTERECTOMY     • JOINT REPLACEMENT Right     hip  2002 DR BROWN   • LUNG LOBECTOMY Left 1955    BRONCHIECTESIS LOWER LOBE   • TONSILLECTOMY      with adennoidectomy       Current Outpatient Medications on File Prior to Visit   Medication Sig Dispense Refill   • ADVAIR DISKUS 250-50 MCG/DOSE DISKUS INHALE 1 PUFF BID  11   • albuterol (PROVENTIL) (2.5 MG/3ML) 0.083% nebulizer solution Take 2.5 mg by nebulization every 4 (four) hours as needed for wheezing.     • calcium carbonate (OS-RADHA) 600 MG tablet Take 600 mg by mouth 2 (two) times a day with meals.     • cetirizine (ZyrTEC) 10 MG tablet Take 10 mg by mouth Daily.     • CONTOUR NEXT  TEST test strip TEST D  12   • Cranberry 1000 MG capsule Take 1 tablet by mouth Daily.     • Cyanocobalamin (VITAMIN B12 SL) Place 1 tablet under the tongue Daily.     • Glucosamine-Chondroitin (GLUCOSAMINE CHONDROITIN COMPLX) 500-250 MG capsule Take 3 capsules by mouth 2 (Two) Times a Day.     • guaiFENesin (MUCINEX) 600 MG 12 hr tablet Take 1,200 mg by mouth 2 (two) times a day.     • HYDROcodone-acetaminophen (NORCO) 5-325 MG per tablet Take 1 tablet by mouth Every 4 (Four) Hours As Needed for Moderate Pain  or Severe Pain . 30 tablet 0   • levothyroxine (SYNTHROID, LEVOTHROID) 75 MCG tablet Take 75 mcg by mouth daily.     • montelukast (SINGULAIR) 10 MG tablet Take 10 mg by mouth every night.     • THOMAS-POLYCIN 3.5-400-21949 ophthalmic ointment APPLY 1/4 INCH STRIP INTO OU BID FOR 2 WEEKS  2   • Probiotic Product (VSL#3 DS PO) Take 1 capsule by mouth Daily with lunch and dinner.     • risedronate (ACTONEL) 35 MG tablet Take 35 mg by mouth Every 7 (Seven) Days. with water on empty stomach, nothing by mouth or lie down for next 30 minutes. Takes on Monday.     • rosuvastatin (CRESTOR) 20 MG tablet Take  by mouth Daily.  6   • sodium chloride 7 % nebulizer solution nebulizer solution USE 1 VIAL PER NEBULIZER TWICE A DAY  3   • tiotropium (SPIRIVA) 18 MCG per inhalation capsule Place 1 capsule into inhaler and inhale 1 (one) time daily. Patient takes the handi mist       No current facility-administered medications on file prior to visit.        Social History     Socioeconomic History   • Marital status:      Spouse name: Not on file   • Number of children: Not on file   • Years of education: Not on file   • Highest education level: Not on file   Tobacco Use   • Smoking status: Passive Smoke Exposure - Never Smoker   • Smokeless tobacco: Never Used   Substance and Sexual Activity   • Alcohol use: No     Comment: CAFFEINE USE: Decaf only   • Drug use: No   • Sexual activity: Never           Review of Systems  "  Constitution: Negative.   HENT: Negative for congestion.    Eyes: Negative for vision loss in left eye and vision loss in right eye.   Respiratory: Negative.  Negative for cough, hemoptysis, shortness of breath, sleep disturbances due to breathing, snoring, sputum production and wheezing.    Endocrine: Negative.    Hematologic/Lymphatic: Negative.    Skin: Negative for poor wound healing and rash.   Musculoskeletal: Negative for falls, gout, muscle cramps and myalgias.   Gastrointestinal: Negative for abdominal pain, diarrhea, dysphagia, hematemesis, melena, nausea and vomiting.   Neurological: Negative for excessive daytime sleepiness, dizziness, headaches, light-headedness, loss of balance, seizures and vertigo.   Psychiatric/Behavioral: Negative for depression and substance abuse. The patient is not nervous/anxious.        Procedures    ECG 12 Lead    Date/Time: 12/10/2020 9:46 AM  Performed by: Jennifer Brown MD  Authorized by: Jennifer Brown MD   Comparison: compared with previous ECG   Comparison to previous ECG: C/w prior ecg, pvcs now seen  Rhythm: sinus rhythm  Ectopy: bigeminy  Conduction: left anterior fascicular block    Clinical impression: abnormal EKG                Objective:      Vitals:    12/10/20 0929   BP: 120/70   BP Location: Right arm   Pulse: 79   Weight: 41.5 kg (91 lb 6.4 oz)   Height: 152.4 cm (60\")     Body mass index is 17.85 kg/m².    Vitals signs reviewed.   Constitutional:       General: Not in acute distress.     Appearance: Well-developed. Not diaphoretic.   Eyes:      General: No scleral icterus.     Conjunctiva/sclera: Conjunctivae normal.   HENT:      Head: Normocephalic and atraumatic.   Neck:      Musculoskeletal: Neck supple.      Thyroid: No thyromegaly.      Vascular: No carotid bruit or JVD.      Lymphadenopathy: No cervical adenopathy.   Pulmonary:      Effort: Pulmonary effort is normal. No respiratory distress.      Breath sounds: Decreased breath sounds " present. Wheezing present. Rhonchi present. No rales.   Chest:      Chest wall: Not tender to palpatation.   Cardiovascular:      Normal rate. Regularly irregular rhythm.      Murmurs: There is a grade 2/6 high frequency blowing holosystolic murmur at the apex.      No gallop.   Pulses:     Intact distal pulses.   Edema:     Peripheral edema absent.   Abdominal:      General: Bowel sounds are normal. There is no distension or abdominal bruit.      Palpations: Abdomen is soft. There is no abdominal mass.      Tenderness: There is no abdominal tenderness.   Musculoskeletal:         General: No deformity.      Extremities: No clubbing present.  Skin:     General: Skin is warm and dry. There is no cyanosis.      Coloration: Skin is not pale.      Findings: No rash.   Neurological:      Mental Status: Alert and oriented to person, place, and time.      Cranial Nerves: No cranial nerve deficit.   Psychiatric:         Judgment: Judgment normal.         Lab Review:       Assessment:      Diagnosis Plan   1. Nonrheumatic mitral valve regurgitation     2. Billowing mitral valve     3. PVC's (premature ventricular contractions)  Holter Monitor - 48 Hour     1. Bronchiectasis with COPD and bronchospasm, follows with Dr. Hudson.  Is on chronic steroids for this.  2. Adrenal insufficiency, on Medrol 4 mg daily for this  3. Mild to moderate mitral insufficiency, no MVP  4. Chronic lower extremity lymphedema.  5. Increasing PVCs, previously these were well controlled but now she is noticing pounding and weakness.  Start diltiazem 15 mg twice daily, check 48-hour Holter.  This may be related to her lung disease as well as adrenal insufficiency and thyroid abnormalities.  This is not related to her mitral insufficiency.  6. Abnormalities on thyroid labs, see Dr. Szymanski, this may be in part driving her PVCs, decreasing energy and increasing work of breathing.          Plan:       See Tequila in 1 month to check on PVCs, medication  tolerance and results of the monitor.

## 2020-12-11 ENCOUNTER — TELEPHONE (OUTPATIENT)
Dept: CARDIOLOGY | Facility: CLINIC | Age: 85
End: 2020-12-11

## 2020-12-11 NOTE — TELEPHONE ENCOUNTER
"Dr Brown,  Pt called to let you know about her thyroid level.    She also mentioned that the diltiazem \"interacts with her medrol and Advair\" per her \"daughter who is a nurse.\"  Pt didn't see either of these meds on her after visit summary.    Upon review of your note from yesterday, you did note that she was taking medrol and the advair was on her med list as well.  I have advised she take the medication as prescribed.      Thanks  Yandy Ruiz RN  Triage nurse    "

## 2020-12-13 ENCOUNTER — TELEPHONE (OUTPATIENT)
Dept: CARDIOLOGY | Facility: CLINIC | Age: 85
End: 2020-12-13

## 2020-12-13 NOTE — TELEPHONE ENCOUNTER
Patient called to report that shortly after removed her holter monitor that it began beeping. She states it has beeped all night and that her daughter came over and was unable to see why it was beeping.     Discussed with EKG tech and KIMBER Hughes. Advised the patient to ignore the beeping and bring it back in tomorrow. Patient is concerned it may strip the 2 day recording. Per EKG tech, it depends on the device whether the information will save if the battery dies.

## 2020-12-15 ENCOUNTER — TELEPHONE (OUTPATIENT)
Dept: CARDIOLOGY | Facility: CLINIC | Age: 85
End: 2020-12-15

## 2020-12-15 NOTE — TELEPHONE ENCOUNTER
Pt called stating that she talked to Dr. Paredes's office   She states high thyroid number means that her thyroid is operating low  They told her that high affects the heart and low does not affect the heart  So she would like to know what is wrong with her heart if its not her thyroid    Pt would like to speak with you personally to discuss

## 2020-12-15 NOTE — TELEPHONE ENCOUNTER
Spoke with her on the phone, no changes, I think her PVCs could be due to underlying lung disease, mitral insufficiency and thyroid abnormalities.

## 2021-01-04 ENCOUNTER — TELEPHONE (OUTPATIENT)
Dept: CARDIOLOGY | Facility: CLINIC | Age: 86
End: 2021-01-04

## 2021-01-04 NOTE — TELEPHONE ENCOUNTER
pls call, having a lot of pvcs on monitor - find out if diltiazem working and make sure upcoming appt with kourtney.

## 2021-01-04 NOTE — TELEPHONE ENCOUNTER
"Patient notified of results and verbalized understanding  palpitations are better    She wanted you to know that during the time she wore the monitor she was john a \"smart vest\" she said she is taking breathing treatments several times a day and the vest vibrates.  She didn't know if that would have any affect on what was found on the holter.    She also mentioned that she is having her thyroid test done Friday.  thanks  Yandy Ruiz RN  Triage nurse      "

## 2021-01-05 NOTE — TELEPHONE ENCOUNTER
I am not sure if it is going to affect the monitor.  We will review the data and let her know.  Thank you

## 2021-01-11 ENCOUNTER — TELEPHONE (OUTPATIENT)
Dept: FAMILY MEDICINE CLINIC | Facility: CLINIC | Age: 86
End: 2021-01-11

## 2021-01-11 NOTE — TELEPHONE ENCOUNTER
Caller: Gely Carlisle    Relationship to patient: Self    Best call back number: [phone]       Patient is needing: PATIENT IS NEEDING TO SCHEDULE LABS

## 2021-01-12 ENCOUNTER — TELEPHONE (OUTPATIENT)
Dept: FAMILY MEDICINE CLINIC | Facility: CLINIC | Age: 86
End: 2021-01-12

## 2021-01-12 NOTE — TELEPHONE ENCOUNTER
Spoke with patient and she wanted me to let you know she was here in on the 7th and saw cardiologist  Dr. Brown on 12/10 did EKG and showing a lot of PVCs and they put her on heart monitor and medication and the results for heart monitor are sinus rhythm was normal and there alot  Of PVCs in the ventriclar.  Wants to do the Thry[place]. Testing.  She also put her on  Diltiazem 30mg and 1/2 tablet in the morning and 1/2 at night. She is seeing Dr. Szymanski on Thursday.  This is just a FYI.

## 2021-01-12 NOTE — TELEPHONE ENCOUNTER
Caller: Gely Carlisle    Relationship to patient: Self    Best call back number: [phone]       Patient is needing: Patient called in and is requesting a call back from bernardo or christen . Please call her . Hub attempted to warm transfer not successful.

## 2021-01-14 ENCOUNTER — OFFICE VISIT (OUTPATIENT)
Dept: CARDIOLOGY | Facility: CLINIC | Age: 86
End: 2021-01-14

## 2021-01-14 VITALS
HEART RATE: 76 BPM | DIASTOLIC BLOOD PRESSURE: 60 MMHG | SYSTOLIC BLOOD PRESSURE: 120 MMHG | HEIGHT: 60 IN | BODY MASS INDEX: 17.47 KG/M2 | WEIGHT: 89 LBS

## 2021-01-14 DIAGNOSIS — I49.3 PVCS (PREMATURE VENTRICULAR CONTRACTIONS): Primary | ICD-10-CM

## 2021-01-14 PROBLEM — E11.9 TYPE 2 DIABETES MELLITUS (HCC): Status: ACTIVE | Noted: 2017-06-29

## 2021-01-14 PROCEDURE — 93000 ELECTROCARDIOGRAM COMPLETE: CPT | Performed by: NURSE PRACTITIONER

## 2021-01-14 PROCEDURE — 99214 OFFICE O/P EST MOD 30 MIN: CPT | Performed by: NURSE PRACTITIONER

## 2021-01-15 NOTE — PROGRESS NOTES
Date of Office Visit: 2021  Encounter Provider: KIMBER Underwood  Place of Service: [facility] CARDIOLOGY  Patient Name: Gely Carlisle  :1935  Primary Cardiologist: Dr. Jennifer Brown     Chief Complaint   Patient presents with   • Follow-up     PVCs   :     HPI: Gely Carlisle is a pleasant 85 y.o. female who presents today for 4 week cardiac follow up visit for PVCs. She is a new patient to me. I have reviewed prior notes/records and there are no changes, except for any new updates described below. I have also reviewed any information entered into the medical record by the patient or by ancillary staff.      She has severe lung disease with bronchiectasis, chronic lung infections, asthma, and COPD followed by Dr. Hudson.  She has adrenal insufficiency due to chronic steroid use.  She has been diagnosed with type 2 diabetes, thyroid disease, and hyperlipidemia.      She has previously followed with Dr. Elpidio Montemayor for history of mitral valve prolapse and mitral regurgitation.  Last echo done 2020 showed the following: LVEF 67%, grade 1 diastolic dysfunction, aortic valve calcification, mild to moderate LVH, and mild to moderate mitral insufficiency.    On 12/10/2020 she established care with Dr. Jennifer Brown Dr. New cardiologist.  Her EKG showed new PVCs.  She reported more difficulty with breathing, fatigue, and her heart was pounding.  Dr. Brown ordered a 48-hour Holter monitor and started her on diltiazem 15 mg twice per day.  She felt that these PVCs may be related to her lung disease, adrenal insufficiency, and thyroid abnormalities.  She did not feel that the PVCs were related to her mitral insufficiency.    I reviewed the Holter monitor from 12/10/2020: This showed normal sinus rhythm, average heart rate 70, minimum 51 and maximum 113, rare premature atrial contractions, frequent PVCs accounting for 17% of total beats.    Today is  "her follow-up visit and her daughter is accompanying her.  She feels that her shortness of breath has worsened today and that her \"asthma\" is worse today.  She has had 2 episodes of palpitations, but not sustained.  I reviewed the Holter monitor results with the daughter and she was wondering if the patient best that she wears to help remind and fibroids could be contributing to these PVCs.  I did not think her blood pressure and heart rate are both stable.    Past Medical History:   Diagnosis Date   • Asthma    • Bronchiectasis (CMS/HCC)    • Cancer (CMS/HCC)     cervical and skin   • Colitis    • COPD (chronic obstructive pulmonary disease) (CMS/HCC)    • Diabetes mellitus (CMS/HCC)     hyperglycemia   • Disease of thyroid gland     hypothyroidism   • History of transfusion    • Hyperlipidemia    • Insufficiency, adrenal (CMS/HCC)    • Leukocytosis    • Lymphedema    • MR (mitral regurgitation)    • MVP (mitral valve prolapse)    • Osteoarthritis    • Osteoporosis    • Scoliosis    • Urinary tract infection        Past Surgical History:   Procedure Laterality Date   • CATARACT EXTRACTION     • CHOLECYSTECTOMY      WITH COMMON BILE DUCT EXPLORATION REMOVAL OF STONE   • COLON SURGERY  1983    RUPTURED DIVERTICULI  DYLAN WORLEY MD   • HERNIA REPAIR  2012    ABDOMINAL LEFT UPPER   • HYSTERECTOMY     • HYSTERECTOMY     • JOINT REPLACEMENT Right     hip  2002 DR BROWN   • LUNG LOBECTOMY Left 1955    BRONCHIECTESIS LOWER LOBE   • TONSILLECTOMY      with adennoidectomy       Social History     Socioeconomic History   • Marital status:      Spouse name: Not on file   • Number of children: Not on file   • Years of education: Not on file   • Highest education level: Not on file   Tobacco Use   • Smoking status: Passive Smoke Exposure - Never Smoker   • Smokeless tobacco: Never Used   Substance and Sexual Activity   • Alcohol use: No     Comment: CAFFEINE USE: Decaf only   • Drug use: No   • Sexual activity: Never "       Family History   Problem Relation Age of Onset   • Heart disease Mother    • Heart attack Mother    • Cancer Sister         BREAST   • No Known Problems Father    • Heart attack Maternal Grandmother    • Heart disease Maternal Grandmother    • Stroke Maternal Grandfather    • No Known Problems Paternal Grandmother    • No Known Problems Paternal Grandfather        The following portion of the patient's history were reviewed and updated as appropriate: past medical history, past surgical history, past social history, past family history, allergies, current medications, and problem list.    Review of Systems   Constitution: Positive for malaise/fatigue.   Cardiovascular: Positive for dyspnea on exertion and palpitations.   Respiratory: Positive for shortness of breath.        Allergies   Allergen Reactions   • Codeine Shortness Of Breath   • Demerol [Meperidine] Shortness Of Breath   • Morphine And Related Shortness Of Breath   • Aminophylline Arrhythmia   • Avelox [Moxifloxacin Hcl In Nacl] Arrhythmia   • Sulfa Antibiotics Hives   • Benadryl Allergy [Diphenhydramine Hcl (Sleep)] Other (See Comments)     Unknown     • Biaxin [Clarithromycin] Other (See Comments)     Unknown reaction   • Latex Rash   • Levaquin [Levofloxacin In D5w] Arrhythmia   • Macrobid [Nitrofurantoin Monohyd Macro] Nausea And Vomiting   • Penicillins Rash         Current Outpatient Medications:   •  ADVAIR DISKUS 250-50 MCG/DOSE DISKUS, INHALE 1 PUFF BID, Disp: , Rfl: 11  •  albuterol (PROVENTIL) (2.5 MG/3ML) 0.083% nebulizer solution, Take 2.5 mg by nebulization every 4 (four) hours as needed for wheezing., Disp: , Rfl:   •  calcium carbonate (OS-RADHA) 600 MG tablet, Take 600 mg by mouth 2 (two) times a day with meals., Disp: , Rfl:   •  cetirizine (ZyrTEC) 10 MG tablet, Take 10 mg by mouth Daily., Disp: , Rfl:   •  CONTOUR NEXT TEST test strip, TEST D, Disp: , Rfl: 12  •  Cranberry 1000 MG capsule, Take 1 tablet by mouth Daily., Disp: ,  "Rfl:   •  Cyanocobalamin (VITAMIN B12 SL), Place 1 tablet under the tongue Daily., Disp: , Rfl:   •  dilTIAZem (Cardizem) 30 MG tablet, Take 0.5 tablets by mouth 2 (two) times a day., Disp: 90 tablet, Rfl: 3  •  Glucosamine-Chondroitin (GLUCOSAMINE CHONDROITIN COMPLX) 500-250 MG capsule, Take 3 capsules by mouth 2 (Two) Times a Day., Disp: , Rfl:   •  guaiFENesin (MUCINEX) 600 MG 12 hr tablet, Take 1,200 mg by mouth 2 (two) times a day., Disp: , Rfl:   •  HYDROcodone-acetaminophen (NORCO) 5-325 MG per tablet, Take 1 tablet by mouth Every 4 (Four) Hours As Needed for Moderate Pain  or Severe Pain ., Disp: 30 tablet, Rfl: 0  •  levothyroxine (SYNTHROID, LEVOTHROID) 75 MCG tablet, Take 75 mcg by mouth daily., Disp: , Rfl:   •  montelukast (SINGULAIR) 10 MG tablet, Take 10 mg by mouth every night., Disp: , Rfl:   •  THOMAS-POLYCIN [id number] ophthalmic ointment, APPLY 1/4 INCH STRIP INTO OU BID FOR 2 WEEKS, Disp: , Rfl: 2  •  Probiotic Product (VSL#3 DS PO), Take 1 capsule by mouth Daily with lunch and dinner., Disp: , Rfl:   •  risedronate (ACTONEL) 35 MG tablet, Take 35 mg by mouth Every 7 (Seven) Days. with water on empty stomach, nothing by mouth or lie down for next 30 minutes. Takes on Monday., Disp: , Rfl:   •  rosuvastatin (CRESTOR) 20 MG tablet, Take  by mouth Daily., Disp: , Rfl: 6  •  sodium chloride 7 % nebulizer solution nebulizer solution, USE 1 VIAL PER NEBULIZER TWICE A DAY, Disp: , Rfl: 3  •  tiotropium (SPIRIVA) 18 MCG per inhalation capsule, Place 1 capsule into inhaler and inhale 1 (one) time daily. Patient takes the handi mist, Disp: , Rfl:         Objective:     Vitals:    01/14/21 1039   BP: 120/60   BP Location: Right arm   Pulse: 76   Weight: 40.4 kg (89 lb)   Height: 152.4 cm (60\")     Body mass index is 17.38 kg/m².    PHYSICAL EXAM:    Vitals Reviewed.   General Appearance: Elderly thin and frail.  Eyes: Conjunctiva and lids: No erythema, swelling, or discharge. Sclera non-icteric.   HENT: " Atraumatic, normocephalic. External eyes, ears, and nose normal. No hearing loss noted. Mucous membranes normal. Lips not cyanotic. Neck supple with no tenderness.  Respiratory: No signs of respiratory distress. Respiration rhythm and depth normal.   Clear to auscultation. No rales, crackles, rhonchi, or wheezing auscultated.   Cardiovascular:  Jugular Venous Pressure: Normal  Heart Rate and Rhythm: Normal, Heart Sounds: Normal S1 and S2. No S3 or S4 noted.  Murmurs: No murmurs noted. No rubs, thrills, or gallops.   Lower Extremities: Bilateral trace lower extremity edema noted.  Gastrointestinal:  Abdomen soft, non-distended, non-tender. Normal bowel sounds.    Musculoskeletal: Normal movement of extremities  Skin and Nails: General appearance normal. No pallor, cyanosis, diaphoresis. Skin temperature normal. No clubbing of fingernails.   Psychiatric: Patient alert and oriented to person, place, and time. Speech and behavior appropriate. Normal mood and affect.       ECG 12 Lead    Date/Time: 1/14/2021 10:47 AM  Performed by: Tequila Portillo APRN  Authorized by: Tequila Portillo APRN   Comparison: compared with previous ECG from 12/10/2020  Comparison to previous ECG: Normal sinus rhythm, frequent PVCs, LVH,  Rhythm: sinus rhythm  Rate: normal  BPM: 76  Conduction: left anterior fascicular block  ST Segments: ST segments normal  T Waves: T waves normal  QRS axis: normal  Other: no other findings    Clinical impression: abnormal EKG              Assessment:       Diagnosis Plan   1. PVCs (premature ventricular contractions)  ECG 12 Lead          Plan:       1.  PVCs: On her initial visit with Dr. Brown 12/2020 she was noted to have frequent PVCs on her EKG.  Hospitalist revealed PVCs accounting for 17% of total heartbeats.  The patient reports 2 episodes of palpitations since her last visit, but nothing sustained.  She is currently on diltiazem at 15 mg twice a day and her blood pressure and heart rate are  stable.  I think that these PVCs are most likely from her adrenal insufficiency, thyroid abnormalities, and lung disease.  I have recommended that we discontinue with her current dose of medication and I do not think that she needs ischemic testing due to her severe lung disease and frail nature.  I will concur with Dr. Brown.    2.  Mitral Insufficiency: Mild to moderate on echocardiogram.    3.  Lung Disease: Follows with Dr. Hudson.    4/5.  Adrenal Insufficiency and Thyroid Disease: Follows with .    5.  I have recommended a follow-up with Dr. Brown in 5 months, unless otherwise needed sooner.    ADDENDUM 1/19/2021:  · Dr. Brown agrees with the plan of care.    As always, it has been a pleasure to participate in your patient's care. Thank you.       Sincerely,       KIMBER Quan  [facility]      · COVID-19 Precautions - Patient was compliant in wearing a mask. When I saw the patient, I used appropriate personal protective equipment (PPE) including mask and eye shield (standard procedure).  Additionally, I used gown and gloves if indicated.  Hand hygiene was completed before and after seeing the patient.  · Dictated utilizing Dragon Dictation

## 2021-04-06 ENCOUNTER — TELEPHONE (OUTPATIENT)
Dept: FAMILY MEDICINE CLINIC | Facility: CLINIC | Age: 86
End: 2021-04-06

## 2021-04-06 DIAGNOSIS — E78.00 HYPERCHOLESTEROLEMIA: Primary | ICD-10-CM

## 2021-04-06 DIAGNOSIS — E03.9 ACQUIRED HYPOTHYROIDISM: ICD-10-CM

## 2021-04-06 DIAGNOSIS — I89.0 LYMPHEDEMA: ICD-10-CM

## 2021-04-06 DIAGNOSIS — E11.65 TYPE 2 DIABETES MELLITUS WITH HYPERGLYCEMIA, WITHOUT LONG-TERM CURRENT USE OF INSULIN (HCC): ICD-10-CM

## 2021-04-06 DIAGNOSIS — M41.9 ACQUIRED KYPHOSCOLIOSIS: ICD-10-CM

## 2021-04-06 DIAGNOSIS — D72.829 LEUKOCYTOSIS, UNSPECIFIED TYPE: ICD-10-CM

## 2021-04-06 DIAGNOSIS — Z79.899 HIGH RISK MEDICATION USE: ICD-10-CM

## 2021-04-06 RX ORDER — HYDROCODONE BITARTRATE AND ACETAMINOPHEN 5; 325 MG/1; MG/1
1 TABLET ORAL EVERY 4 HOURS PRN
Qty: 30 TABLET | Refills: 0 | Status: SHIPPED | OUTPATIENT
Start: 2021-04-06 | End: 2021-08-11 | Stop reason: SDUPTHER

## 2021-04-07 ENCOUNTER — TELEPHONE (OUTPATIENT)
Dept: FAMILY MEDICINE CLINIC | Facility: CLINIC | Age: 86
End: 2021-04-07

## 2021-04-07 NOTE — TELEPHONE ENCOUNTER
Pt came in and dropped off urine wanting to know if you wanted to check for protein.  She said that Dr. Szymanski's office does that.  There is no lab lady today.  I am drawing blood in the lab today.

## 2021-04-09 ENCOUNTER — TELEPHONE (OUTPATIENT)
Dept: FAMILY MEDICINE CLINIC | Facility: CLINIC | Age: 86
End: 2021-04-09

## 2021-04-09 ENCOUNTER — OFFICE VISIT (OUTPATIENT)
Dept: FAMILY MEDICINE CLINIC | Facility: CLINIC | Age: 86
End: 2021-04-09

## 2021-04-09 VITALS
BODY MASS INDEX: 16.88 KG/M2 | DIASTOLIC BLOOD PRESSURE: 68 MMHG | HEIGHT: 60 IN | WEIGHT: 86 LBS | HEART RATE: 68 BPM | OXYGEN SATURATION: 97 % | TEMPERATURE: 97.8 F | SYSTOLIC BLOOD PRESSURE: 100 MMHG | RESPIRATION RATE: 18 BRPM

## 2021-04-09 DIAGNOSIS — M81.8 OTHER OSTEOPOROSIS WITHOUT CURRENT PATHOLOGICAL FRACTURE: ICD-10-CM

## 2021-04-09 DIAGNOSIS — E03.9 ACQUIRED HYPOTHYROIDISM: Primary | ICD-10-CM

## 2021-04-09 DIAGNOSIS — E27.40 CHRONIC ADRENAL INSUFFICIENCY (HCC): ICD-10-CM

## 2021-04-09 DIAGNOSIS — E11.65 TYPE 2 DIABETES MELLITUS WITH HYPERGLYCEMIA, WITHOUT LONG-TERM CURRENT USE OF INSULIN (HCC): ICD-10-CM

## 2021-04-09 DIAGNOSIS — D72.829 LEUKOCYTOSIS, UNSPECIFIED TYPE: ICD-10-CM

## 2021-04-09 DIAGNOSIS — J44.9 CHRONIC OBSTRUCTIVE PULMONARY DISEASE, UNSPECIFIED COPD TYPE (HCC): ICD-10-CM

## 2021-04-09 PROCEDURE — 99213 OFFICE O/P EST LOW 20 MIN: CPT | Performed by: FAMILY MEDICINE

## 2021-04-09 RX ORDER — METHYLPREDNISOLONE 4 MG/1
TABLET ORAL
COMMUNITY
Start: 2021-04-03 | End: 2021-04-09

## 2021-04-09 NOTE — TELEPHONE ENCOUNTER
Caller: Gely Carlisle    Relationship to patient: Self    Best call back number:[phone]     Patient is needing:  PATIENT WANTED TO TALK TO THE OFFICE NOT TO THE HUB . SHE STATES SHE WANTS TO TALK ONLY TO ZENAIDA GANDARA OR HER DOCTOR ABOUT SOME QUESTIONS SHE HAS ABOUT HER LAB RESULTS. SHE SAID IT WAS VERY IMPORTANT .

## 2021-04-09 NOTE — PROGRESS NOTES
MONSERRAT  Gely Carlisle is a 85 y.o. female who is here for follow up of multiple ongoing medical issues as well as recent lab work.  Followed by endocrinologist for diabetes and hypothyroidism.  Apparently has been on long-term Depo-Medrol and diagnosed with adrenal suppression requiring continued therapy.  Obviously this complicates her diabetes and other issues.  Continues to lose weight.  Recent lab shows elevated white blood cell count most likely related to chronic steroid therapy.  Is currently being treated by eye specialist for blepharitis and this also was discussed.  Has follow-up appointment with endocrinologist in June.  Has obvious arthritic changes in the hands that appears consistent with rheumatoid arthritis.  Reports was seen by rheumatologist in the past and told rheumatoid arthritis tests were all negative.  This also was discussed.  Some of the changes could be related to long-term steroid therapy with obvious bone changes.  Patient continues to lose weight.      Review of Systems   Constitutional: Positive for unexpected weight change.   Musculoskeletal: Positive for arthralgias.   Neurological: Positive for weakness.   All other systems reviewed and are negative.        Past Medical History:   Diagnosis Date   • Asthma    • Bronchiectasis (CMS/HCC)    • Cancer (CMS/HCC)     cervical and skin   • Colitis    • COPD (chronic obstructive pulmonary disease) (CMS/HCC)    • Diabetes mellitus (CMS/HCC)     hyperglycemia   • Disease of thyroid gland     hypothyroidism   • History of transfusion    • Hyperlipidemia    • Insufficiency, adrenal (CMS/HCC)    • Leukocytosis    • Lymphedema    • MR (mitral regurgitation)    • MVP (mitral valve prolapse)    • Osteoarthritis    • Osteoporosis    • Scoliosis    • Urinary tract infection        Past Surgical History:   Procedure Laterality Date   • CATARACT EXTRACTION     • CHOLECYSTECTOMY      WITH COMMON BILE DUCT EXPLORATION REMOVAL OF STONE   • COLON  SURGERY  1983    RUPTURED DIVERTICULI  DYLAN WORLEY MD   • HERNIA REPAIR  2012    ABDOMINAL LEFT UPPER   • HYSTERECTOMY     • HYSTERECTOMY     • JOINT REPLACEMENT Right     hip  2002 DR BROWN   • LUNG LOBECTOMY Left 1955    BRONCHIECTESIS LOWER LOBE   • TONSILLECTOMY      with adennoidectomy       Family History   Problem Relation Age of Onset   • Heart disease Mother    • Heart attack Mother    • Cancer Sister         BREAST   • No Known Problems Father    • Heart attack Maternal Grandmother    • Heart disease Maternal Grandmother    • Stroke Maternal Grandfather    • No Known Problems Paternal Grandmother    • No Known Problems Paternal Grandfather        Social History     Socioeconomic History   • Marital status:      Spouse name: Not on file   • Number of children: Not on file   • Years of education: Not on file   • Highest education level: Not on file   Tobacco Use   • Smoking status: Passive Smoke Exposure - Never Smoker   • Smokeless tobacco: Never Used   Substance and Sexual Activity   • Alcohol use: No     Comment: CAFFEINE USE: Decaf only   • Drug use: No   • Sexual activity: Never       Vitals:    04/09/21 1208   BP: 100/68   Pulse: 68   Resp: 18   Temp: 97.8 °F (36.6 °C)   SpO2: 97%        Body mass index is 16.8 kg/m².      Physical Exam  Vitals and nursing note reviewed. Exam conducted with a chaperone present.   Constitutional:       General: She is not in acute distress.     Appearance: She is well-developed. She is ill-appearing.   HENT:      Head: Normocephalic and atraumatic.      Nose:      Comments: Patient and friend with mask.  Provider with mask and shield  Eyes:      Conjunctiva/sclera: Conjunctivae normal.      Pupils: Pupils are equal, round, and reactive to light.   Neck:      Thyroid: No thyromegaly.   Cardiovascular:      Rate and Rhythm: Normal rate and regular rhythm.      Heart sounds: Normal heart sounds.   Pulmonary:      Effort: Pulmonary effort is normal. No respiratory  distress.      Breath sounds: Normal breath sounds.   Abdominal:      General: There is no distension.      Palpations: There is no mass.      Tenderness: There is no abdominal tenderness.      Hernia: No hernia is present.   Musculoskeletal:         General: Deformity present. No tenderness.      Cervical back: Normal range of motion.   Lymphadenopathy:      Cervical: No cervical adenopathy.   Skin:     General: Skin is warm and dry.      Coloration: Skin is not pale.      Findings: No rash.   Neurological:      General: No focal deficit present.      Mental Status: She is alert and oriented to person, place, and time. Mental status is at baseline.      Motor: No abnormal muscle tone.      Coordination: Coordination normal.   Psychiatric:         Mood and Affect: Mood normal.         Behavior: Behavior normal.         Thought Content: Thought content normal.         Judgment: Judgment normal.           Assessment/Plan    Diagnoses and all orders for this visit:    1. Acquired hypothyroidism (Primary)    2. Chronic adrenal insufficiency (CMS/HCC)    3. Type 2 diabetes mellitus with hyperglycemia, without long-term current use of insulin (CMS/HCC)    4. Leukocytosis, unspecified type    5. Other osteoporosis without current pathological fracture    6. Chronic obstructive pulmonary disease, unspecified COPD type (CMS/HCC)        Patient with multiple ongoing medical issues some of which are noted above.  Also followed by other specialists.  Physically patient is in very poor health and continues to lose weight.  Do need to encourage nutritional supplements.  Keep follow-up appointment with endocrinologist in 3 months follow-up in 6 months as per plans.  Prognosis remains guarded.  Mentation remains good and patient seems to be in pain.    This note includes information entered using a voice recognition dictation system.  Though reviewed, some nonsensible errors may remain.

## 2021-04-21 ENCOUNTER — TELEPHONE (OUTPATIENT)
Dept: CARDIOLOGY | Facility: CLINIC | Age: 86
End: 2021-04-21

## 2021-04-21 NOTE — TELEPHONE ENCOUNTER
Pt called stating that she was recently seen at [facility] urgent care     Her heart rate was 44 and they advised her to give you a call in regards to this    Pt is on Diltiazem 30 mg half a tablet twice daily

## 2021-04-22 ENCOUNTER — CLINICAL SUPPORT (OUTPATIENT)
Dept: CARDIOLOGY | Facility: CLINIC | Age: 86
End: 2021-04-22

## 2021-04-22 DIAGNOSIS — I49.3 PVCS (PREMATURE VENTRICULAR CONTRACTIONS): Primary | ICD-10-CM

## 2021-04-22 PROCEDURE — 93000 ELECTROCARDIOGRAM COMPLETE: CPT | Performed by: INTERNAL MEDICINE

## 2021-04-22 NOTE — PROGRESS NOTES
Procedure     ECG 12 Lead    Date/Time: 4/22/2021 3:01 PM  Performed by: Jennifer Brown MD  Authorized by: Jennifer Brown MD   Comparison: compared with previous ECG   Comparison to previous ECG: Ventricular bigeminy  Rhythm: sinus rhythm  Ectopy: unifocal PVCs and bigeminy    Clinical impression: abnormal EKG        patient came in for EKG Pulm doctor reported low HR  Per Dr. Brown she is having PVC causing 1/2 of Heart Rate   Being picked up every other beat    Patient to stay on ditiazem 15mg 1 po bid

## 2021-04-22 NOTE — TELEPHONE ENCOUNTER
Fell in pulmonary office yesterday and Friday saw another doc and it was 41 then.    I have scheduled her to be seen for an EKG today   Thanks  Yandy Ruiz RN  Triage nurse

## 2021-07-01 ENCOUNTER — OFFICE VISIT (OUTPATIENT)
Dept: CARDIOLOGY | Facility: CLINIC | Age: 86
End: 2021-07-01

## 2021-07-01 VITALS
SYSTOLIC BLOOD PRESSURE: 110 MMHG | HEIGHT: 63 IN | WEIGHT: 95 LBS | HEART RATE: 100 BPM | BODY MASS INDEX: 16.83 KG/M2 | DIASTOLIC BLOOD PRESSURE: 60 MMHG

## 2021-07-01 DIAGNOSIS — I34.0 NONRHEUMATIC MITRAL VALVE REGURGITATION: Primary | ICD-10-CM

## 2021-07-01 DIAGNOSIS — I49.3 PVCS (PREMATURE VENTRICULAR CONTRACTIONS): ICD-10-CM

## 2021-07-01 DIAGNOSIS — J47.9 BRONCHIECTASIS WITHOUT COMPLICATION (HCC): ICD-10-CM

## 2021-07-01 DIAGNOSIS — E78.00 HYPERCHOLESTEROLEMIA: ICD-10-CM

## 2021-07-01 DIAGNOSIS — E11.9 TYPE 2 DIABETES MELLITUS WITHOUT COMPLICATION, WITHOUT LONG-TERM CURRENT USE OF INSULIN (HCC): ICD-10-CM

## 2021-07-01 DIAGNOSIS — J42 CHRONIC BRONCHITIS, UNSPECIFIED CHRONIC BRONCHITIS TYPE (HCC): ICD-10-CM

## 2021-07-01 DIAGNOSIS — I34.1 BILLOWING MITRAL VALVE: ICD-10-CM

## 2021-07-01 PROCEDURE — 99214 OFFICE O/P EST MOD 30 MIN: CPT | Performed by: INTERNAL MEDICINE

## 2021-07-01 PROCEDURE — 93000 ELECTROCARDIOGRAM COMPLETE: CPT | Performed by: INTERNAL MEDICINE

## 2021-07-01 RX ORDER — FUROSEMIDE 20 MG/1
20 TABLET ORAL EVERY MORNING
Qty: 30 TABLET | Refills: 11 | Status: SHIPPED | OUTPATIENT
Start: 2021-07-01

## 2021-07-01 NOTE — PROGRESS NOTES
Date of Office Visit: 2021  Encounter Provider: Jennifer Brown MD  Place of Service: [facility] CARDIOLOGY  Patient Name: Gely Carlisle  :1935      Patient ID:  Gely Carlisle is a 85 y.o. female is here for  followup for MR and PVCs.          History of Present Illness    She has a history of mitral valve prolapse and mitral regurgitation. She had bronchiectasis with chronic lung infections.  She also has asthma and COPD.  She has adrenal insufficiency due to chronic steroid use.  She has diabetes mellitus type 2, gout, thyroid disease, history of cervical and skin cancer.  She has hyperlipidemia.  She had vascular screening done 2017 which showed normal abdominal aorta, normal carotid arteries.  ABIs could not be obtained     She is , has 3 children retired.  She uses no cigarettes, alcohol or drugs.  She has 2 cups of coffee per day.  Her daughter has hypertension and her mother had heart disease.     She follows with Dr. Hudson for her bronchiectasis and history of Pseudomonas infections.  She also COPD. She follows with Dr. Szymanski for for her thyroid and adrenal insufficiency.  She takes Medrol 4 mg daily for her adrenal insufficiency.      Echo done 2020 shows mild to moderate left ventricular hypertrophy with mild to moderate mitral insufficiency, calcification of the aortic valve, ejection fraction 67% with grade 1 diastolic dysfunction, normal chamber sizes.  She had all 48-hour Holter monitor done 2020 which showed frequent PVCs occurring 17% PVCs, no VT.     Labs on 2021 show normal CMP, hemoglobin A1c 6, free T4 elevated at 1.91 but normal TSH, total cholesterol 225, HDL 85, , triglycerides 83, white count 16.8 but otherwise normal CBC.      She is very frail.  She was very short winded in March and 2021 and thought she might not make it.  She was placed on high-dose steroids and Cipro.  She finally pulled out of  it but still feels quite weak.  She has all PVCs today.  She has episodes of dizziness but thankfully no falls or syncope.  Her blood pressure is low.  She does not feel her heart racing or skipping.  She does have orthopnea due to her lung disease.  She denies any chest pain or pressure.    Past Medical History:   Diagnosis Date   • Asthma    • Bronchiectasis (CMS/HCC)    • Cancer (CMS/HCC)     cervical and skin   • Colitis    • COPD (chronic obstructive pulmonary disease) (CMS/HCC)    • Diabetes mellitus (CMS/HCC)     hyperglycemia   • Disease of thyroid gland     hypothyroidism   • History of transfusion    • Hyperlipidemia    • Insufficiency, adrenal (CMS/HCC)    • Leukocytosis    • Lymphedema    • MR (mitral regurgitation)    • MVP (mitral valve prolapse)    • Osteoarthritis    • Osteoporosis    • Scoliosis    • Urinary tract infection          Past Surgical History:   Procedure Laterality Date   • CATARACT EXTRACTION     • CHOLECYSTECTOMY      WITH COMMON BILE DUCT EXPLORATION REMOVAL OF STONE   • COLON SURGERY  1983    RUPTURED DIVERTICULI  DYLAN WORLEY MD   • HERNIA REPAIR  2012    ABDOMINAL LEFT UPPER   • HYSTERECTOMY     • HYSTERECTOMY     • JOINT REPLACEMENT Right     hip  2002 DR BROWN   • LUNG LOBECTOMY Left 1955    BRONCHIECTESIS LOWER LOBE   • TONSILLECTOMY      with adennoidectomy       Current Outpatient Medications on File Prior to Visit   Medication Sig Dispense Refill   • ADVAIR DISKUS 250-50 MCG/DOSE DISKUS INHALE 1 PUFF BID  11   • albuterol (PROVENTIL) (2.5 MG/3ML) 0.083% nebulizer solution Take 2.5 mg by nebulization every 4 (four) hours as needed for wheezing.     • calcium carbonate (OS-RADHA) 600 MG tablet Take 600 mg by mouth 2 (two) times a day with meals.     • cetirizine (ZyrTEC) 10 MG tablet Take 10 mg by mouth Daily.     • CONTOUR NEXT TEST test strip TEST D  12   • Cranberry 1000 MG capsule Take 1 tablet by mouth Daily.     • Cyanocobalamin (VITAMIN B12 SL) Place 1 tablet under the  tongue Daily.     • dilTIAZem (Cardizem) 30 MG tablet Take 0.5 tablets by mouth 2 (two) times a day. 90 tablet 3   • Glucosamine-Chondroitin (GLUCOSAMINE CHONDROITIN COMPLX) 500-250 MG capsule Take 3 capsules by mouth 2 (Two) Times a Day.     • guaiFENesin (MUCINEX) 600 MG 12 hr tablet Take 1,200 mg by mouth 2 (two) times a day.     • HYDROcodone-acetaminophen (NORCO) 5-325 MG per tablet Take 1 tablet by mouth Every 4 (Four) Hours As Needed for Moderate Pain  or Severe Pain . 30 tablet 0   • levothyroxine (SYNTHROID, LEVOTHROID) 75 MCG tablet Take 75 mcg by mouth daily.     • montelukast (SINGULAIR) 10 MG tablet Take 10 mg by mouth every night.     • THOMAS-POLYCIN 3.5-400-35622 ophthalmic ointment APPLY 1/4 INCH STRIP INTO OU BID FOR 2 WEEKS  2   • Probiotic Product (VSL#3 DS PO) Take 1 capsule by mouth Daily with lunch and dinner.     • risedronate (ACTONEL) 35 MG tablet Take 35 mg by mouth Every 7 (Seven) Days. with water on empty stomach, nothing by mouth or lie down for next 30 minutes. Takes on Monday.     • rosuvastatin (CRESTOR) 20 MG tablet Take  by mouth Daily.  6   • sodium chloride 7 % nebulizer solution nebulizer solution USE 1 VIAL PER NEBULIZER TWICE A DAY  3   • tiotropium (SPIRIVA) 18 MCG per inhalation capsule Place 1 capsule into inhaler and inhale 1 (one) time daily. Patient takes the handi mist       No current facility-administered medications on file prior to visit.       Social History     Socioeconomic History   • Marital status:      Spouse name: Not on file   • Number of children: Not on file   • Years of education: Not on file   • Highest education level: Not on file   Tobacco Use   • Smoking status: Passive Smoke Exposure - Never Smoker   • Smokeless tobacco: Never Used   Vaping Use   • Vaping Use: Never used   Substance and Sexual Activity   • Alcohol use: No     Comment: CAFFEINE USE: Decaf only   • Drug use: No   • Sexual activity: Never           ROS    Procedures    ECG 12  "Lead    Date/Time: 7/1/2021 12:36 PM  Performed by: Jennifer Brown MD  Authorized by: Jennifer Brown MD   Comparison: compared with previous ECG   Similar to previous ECG  Rhythm: sinus rhythm  Ectopy: unifocal PVCs and bigeminy  Other findings: poor R wave progression    Clinical impression: abnormal EKG                Objective:      Vitals:    07/01/21 1221   BP: 110/60   BP Location: Right arm   Pulse: 100   Weight: 43.1 kg (95 lb)   Height: 160 cm (63\")     Body mass index is 16.83 kg/m².    Vitals reviewed.   Constitutional:       General: Not in acute distress.     Appearance: Well-developed. Cachectic. Chronically ill-appearing. Not diaphoretic.   Eyes:      General: No scleral icterus.     Conjunctiva/sclera: Conjunctivae normal.   HENT:      Head: Normocephalic and atraumatic.   Neck:      Thyroid: No thyromegaly.      Vascular: No carotid bruit or JVD.      Lymphadenopathy: No cervical adenopathy.   Pulmonary:      Effort: Pulmonary effort is normal. No respiratory distress.      Breath sounds: Decreased breath sounds present. No wheezing. Rhonchi present. No rales.   Chest:      Chest wall: Not tender to palpatation.   Cardiovascular:      Normal rate. Regularly irregular rhythm.      Murmurs: There is no murmur.      No gallop.   Pulses:     Intact distal pulses.   Edema:     Peripheral edema present.     Pretibial: bilateral 2+ edema of the pretibial area.     Ankle: bilateral 3+ edema of the ankle.  Abdominal:      General: Bowel sounds are normal. There is no distension or abdominal bruit.      Palpations: Abdomen is soft. There is no abdominal mass.      Tenderness: There is no abdominal tenderness.   Musculoskeletal:         General: No deformity.      Extremities: No clubbing present.     Cervical back: Neck supple. Skin:     General: Skin is warm and dry. There is no cyanosis.      Coloration: Skin is not pale.      Findings: No rash.   Neurological:      Mental Status: Alert and " oriented to person, place, and time.      Cranial Nerves: No cranial nerve deficit.   Psychiatric:         Judgment: Judgment normal.         Lab Review:       Assessment:      Diagnosis Plan   1. Nonrheumatic mitral valve regurgitation  Adult Transthoracic Echo Complete W/ Cont if Necessary Per Protocol   2. Billowing mitral valve  Adult Transthoracic Echo Complete W/ Cont if Necessary Per Protocol   3. Hypercholesterolemia     4. PVCs (premature ventricular contractions)     5. Type 2 diabetes mellitus without complication, without long-term current use of insulin (CMS/[facility])     6. Chronic bronchitis, unspecified chronic bronchitis type (CMS/[facility])     7. Bronchiectasis without complication (CMS/[facility])       1. Bronchiectasis with COPD and bronchospasm, follows with Dr. Hudson.  This is severe and worsening.  2. Adrenal insufficiency, on Medrol 4 mg daily for this, sees Dr. Szymanski.   3. Mild to moderate mitral insufficiency, no MVP  4. Chronic lower extremity lymphedema.  5. frequent PVCs in bigeminy, related to her lung disease as well as adrenal insufficiency and thyroid abnormalities.   6. Severe lower extremity pitting edema, start Lasix 20 mg daily.  Check an echo when she sees Tequila in 6 weeks.       Plan:       See Tequila in 6 weeks, start Lasix, no other medication changes.  I cannot go up on her diltiazem for PVCs because her blood pressure is too low.  She does have dizziness which is think is due to hypotension.  We have to be very careful with her medications.

## 2021-08-11 ENCOUNTER — TELEPHONE (OUTPATIENT)
Dept: FAMILY MEDICINE CLINIC | Facility: CLINIC | Age: 86
End: 2021-08-11

## 2021-08-11 DIAGNOSIS — M41.9 ACQUIRED KYPHOSCOLIOSIS: ICD-10-CM

## 2021-08-11 RX ORDER — HYDROCODONE BITARTRATE AND ACETAMINOPHEN 5; 325 MG/1; MG/1
1 TABLET ORAL EVERY 4 HOURS PRN
Qty: 30 TABLET | Refills: 0 | Status: SHIPPED | OUTPATIENT
Start: 2021-08-11 | End: 2021-11-16 | Stop reason: SDUPTHER

## 2021-08-11 NOTE — TELEPHONE ENCOUNTER
Rx Refill Note  Requested Prescriptions     Pending Prescriptions Disp Refills   • HYDROcodone-acetaminophen (NORCO) 5-325 MG per tablet 30 tablet 0     Sig: Take 1 tablet by mouth Every 4 (Four) Hours As Needed for Moderate Pain  or Severe Pain .      Last office visit with prescribing clinician: 4/9/2021      Next office visit with prescribing clinician: Visit date not found            Madiha Artis MA  08/11/21, 16:02 EDT

## 2021-08-11 NOTE — TELEPHONE ENCOUNTER
Caller: Gely Carlisle    Relationship: Self    Best call back number: [phone]  Medication needed:   Requested Prescriptions     Pending Prescriptions Disp Refills   • HYDROcodone-acetaminophen (NORCO) 5-325 MG per tablet 30 tablet 0     Sig: Take 1 tablet by mouth Every 4 (Four) Hours As Needed for Moderate Pain  or Severe Pain .       When do you need the refill by: 08/11    What additional details did the patient provide when requesting the medication: PATIENT HAS TWO LEFT    Does the patient have less than a 3 day supply:  [x] Yes  [] No    What is the patient's preferred pharmacy: [id number] [address] AT [facility] [phone] [facility] [phone]

## 2021-08-13 ENCOUNTER — HOSPITAL ENCOUNTER (OUTPATIENT)
Dept: CARDIOLOGY | Facility: HOSPITAL | Age: 86
Discharge: HOME | End: 2021-08-13
Admitting: INTERNAL MEDICINE

## 2021-08-13 ENCOUNTER — TELEPHONE (OUTPATIENT)
Dept: CARDIOLOGY | Facility: CLINIC | Age: 86
End: 2021-08-13

## 2021-08-13 ENCOUNTER — OFFICE VISIT (OUTPATIENT)
Dept: CARDIOLOGY | Facility: CLINIC | Age: 86
End: 2021-08-13

## 2021-08-13 VITALS
HEIGHT: 63 IN | BODY MASS INDEX: 16.3 KG/M2 | DIASTOLIC BLOOD PRESSURE: 82 MMHG | WEIGHT: 92 LBS | SYSTOLIC BLOOD PRESSURE: 112 MMHG

## 2021-08-13 VITALS — HEIGHT: 63 IN | BODY MASS INDEX: 16.83 KG/M2 | WEIGHT: 95 LBS | HEART RATE: 70 BPM

## 2021-08-13 DIAGNOSIS — I49.3 PVC (PREMATURE VENTRICULAR CONTRACTION): ICD-10-CM

## 2021-08-13 DIAGNOSIS — J47.9 BRONCHIECTASIS WITHOUT COMPLICATION (HCC): ICD-10-CM

## 2021-08-13 DIAGNOSIS — I34.0 NONRHEUMATIC MITRAL VALVE REGURGITATION: Primary | ICD-10-CM

## 2021-08-13 DIAGNOSIS — I34.1 BILLOWING MITRAL VALVE: ICD-10-CM

## 2021-08-13 DIAGNOSIS — I34.0 NONRHEUMATIC MITRAL VALVE REGURGITATION: ICD-10-CM

## 2021-08-13 DIAGNOSIS — E03.9 ACQUIRED HYPOTHYROIDISM: ICD-10-CM

## 2021-08-13 DIAGNOSIS — R60.0 LOWER EXTREMITY EDEMA: ICD-10-CM

## 2021-08-13 DIAGNOSIS — R06.02 SHORTNESS OF BREATH: ICD-10-CM

## 2021-08-13 DIAGNOSIS — J42 CHRONIC BRONCHITIS, UNSPECIFIED CHRONIC BRONCHITIS TYPE (HCC): ICD-10-CM

## 2021-08-13 DIAGNOSIS — E27.40 CHRONIC ADRENAL INSUFFICIENCY (HCC): ICD-10-CM

## 2021-08-13 LAB
ASCENDING AORTA: 2.7 CM
BH CV ECHO MEAS - AI DEC SLOPE: 116.8 CM/SEC^2
BH CV ECHO MEAS - AI MAX PG: 22.1 MMHG
BH CV ECHO MEAS - AI MAX VEL: 235.3 CM/SEC
BH CV ECHO MEAS - AI P1/2T: 589.9 MSEC
BH CV ECHO MEAS - AO MAX PG (FULL): 3.7 MMHG
BH CV ECHO MEAS - AO MAX PG: 7.7 MMHG
BH CV ECHO MEAS - AO MEAN PG (FULL): 2.3 MMHG
BH CV ECHO MEAS - AO MEAN PG: 4.7 MMHG
BH CV ECHO MEAS - AO V2 MAX: 138.8 CM/SEC
BH CV ECHO MEAS - AO V2 MEAN: 104 CM/SEC
BH CV ECHO MEAS - AO V2 VTI: 38.1 CM
BH CV ECHO MEAS - ASC AORTA: 2.7 CM
BH CV ECHO MEAS - AVA(I,A): 2 CM^2
BH CV ECHO MEAS - AVA(I,D): 2 CM^2
BH CV ECHO MEAS - AVA(V,A): 2.2 CM^2
BH CV ECHO MEAS - AVA(V,D): 2.2 CM^2
BH CV ECHO MEAS - BSA(HAYCOCK): 1.4 M^2
BH CV ECHO MEAS - BSA: 1.4 M^2
BH CV ECHO MEAS - BZI_BMI: 16.8 KILOGRAMS/M^2
BH CV ECHO MEAS - BZI_METRIC_HEIGHT: 160 CM
BH CV ECHO MEAS - BZI_METRIC_WEIGHT: 43.1 KG
BH CV ECHO MEAS - EDV(CUBED): 107.9 ML
BH CV ECHO MEAS - EDV(MOD-SP2): 55 ML
BH CV ECHO MEAS - EDV(MOD-SP4): 53 ML
BH CV ECHO MEAS - EDV(TEICH): 105.5 ML
BH CV ECHO MEAS - EF(CUBED): 74.3 %
BH CV ECHO MEAS - EF(MOD-BP): 62 %
BH CV ECHO MEAS - EF(MOD-SP2): 60 %
BH CV ECHO MEAS - EF(MOD-SP4): 62.3 %
BH CV ECHO MEAS - EF(TEICH): 66.1 %
BH CV ECHO MEAS - ESV(MOD-SP2): 22 ML
BH CV ECHO MEAS - ESV(MOD-SP4): 20 ML
BH CV ECHO MEAS - ESV(TEICH): 35.8 ML
BH CV ECHO MEAS - FS: 36.4 %
BH CV ECHO MEAS - IVS/LVPW: 0.99
BH CV ECHO MEAS - IVSD: 0.87 CM
BH CV ECHO MEAS - LAT PEAK E' VEL: 10.3 CM/SEC
BH CV ECHO MEAS - LV DIASTOLIC VOL/BSA (35-75): 37.6 ML/M^2
BH CV ECHO MEAS - LV MASS(C)D: 141.1 GRAMS
BH CV ECHO MEAS - LV MASS(C)DI: 100.1 GRAMS/M^2
BH CV ECHO MEAS - LV MAX PG: 4 MMHG
BH CV ECHO MEAS - LV MEAN PG: 2.5 MMHG
BH CV ECHO MEAS - LV SYSTOLIC VOL/BSA (12-30): 14.2 ML/M^2
BH CV ECHO MEAS - LV V1 MAX: 99.9 CM/SEC
BH CV ECHO MEAS - LV V1 MEAN: 75.7 CM/SEC
BH CV ECHO MEAS - LV V1 VTI: 25.3 CM
BH CV ECHO MEAS - LVIDD: 4.8 CM
BH CV ECHO MEAS - LVIDS: 3 CM
BH CV ECHO MEAS - LVLD AP2: 6.6 CM
BH CV ECHO MEAS - LVLD AP4: 6.9 CM
BH CV ECHO MEAS - LVLS AP2: 5.7 CM
BH CV ECHO MEAS - LVLS AP4: 5.5 CM
BH CV ECHO MEAS - LVOT AREA (M): 3.1 CM^2
BH CV ECHO MEAS - LVOT AREA: 3.1 CM^2
BH CV ECHO MEAS - LVOT DIAM: 2 CM
BH CV ECHO MEAS - LVPWD: 0.88 CM
BH CV ECHO MEAS - MED PEAK E' VEL: 7.1 CM/SEC
BH CV ECHO MEAS - MR MAX PG: 89.7 MMHG
BH CV ECHO MEAS - MR MAX VEL: 473.5 CM/SEC
BH CV ECHO MEAS - MV A DUR: 0.13 SEC
BH CV ECHO MEAS - MV A MAX VEL: 161.6 CM/SEC
BH CV ECHO MEAS - MV DEC SLOPE: 467.2 CM/SEC^2
BH CV ECHO MEAS - MV DEC TIME: 0.15 SEC
BH CV ECHO MEAS - MV E MAX VEL: 101 CM/SEC
BH CV ECHO MEAS - MV E/A: 0.63
BH CV ECHO MEAS - MV MAX PG: 11.5 MMHG
BH CV ECHO MEAS - MV MEAN PG: 3.6 MMHG
BH CV ECHO MEAS - MV P1/2T MAX VEL: 110.4 CM/SEC
BH CV ECHO MEAS - MV P1/2T: 69.2 MSEC
BH CV ECHO MEAS - MV V2 MAX: 169.4 CM/SEC
BH CV ECHO MEAS - MV V2 MEAN: 88.2 CM/SEC
BH CV ECHO MEAS - MV V2 VTI: 41 CM
BH CV ECHO MEAS - MVA P1/2T LCG: 2 CM^2
BH CV ECHO MEAS - MVA(P1/2T): 3.2 CM^2
BH CV ECHO MEAS - MVA(VTI): 1.9 CM^2
BH CV ECHO MEAS - PA ACC TIME: 0.11 SEC
BH CV ECHO MEAS - PA MAX PG (FULL): 0.8 MMHG
BH CV ECHO MEAS - PA MAX PG: 3.7 MMHG
BH CV ECHO MEAS - PA PR(ACCEL): 28.3 MMHG
BH CV ECHO MEAS - PA V2 MAX: 96.4 CM/SEC
BH CV ECHO MEAS - PULM A REVS DUR: 0.11 SEC
BH CV ECHO MEAS - PULM A REVS VEL: 38.6 CM/SEC
BH CV ECHO MEAS - PULM DIAS VEL: 29.7 CM/SEC
BH CV ECHO MEAS - PULM S/D: 1.4
BH CV ECHO MEAS - PULM SYS VEL: 42.5 CM/SEC
BH CV ECHO MEAS - PVA(V,A): 2.7 CM^2
BH CV ECHO MEAS - PVA(V,D): 2.7 CM^2
BH CV ECHO MEAS - QP/QS: 0.58
BH CV ECHO MEAS - RAP SYSTOLE: 3 MMHG
BH CV ECHO MEAS - RV MAX PG: 2.9 MMHG
BH CV ECHO MEAS - RV MEAN PG: 1.3 MMHG
BH CV ECHO MEAS - RV V1 MAX: 85.3 CM/SEC
BH CV ECHO MEAS - RV V1 MEAN: 49.5 CM/SEC
BH CV ECHO MEAS - RV V1 VTI: 14.8 CM
BH CV ECHO MEAS - RVOT AREA: 3 CM^2
BH CV ECHO MEAS - RVOT DIAM: 2 CM
BH CV ECHO MEAS - RVSP: 37 MMHG
BH CV ECHO MEAS - SI(CUBED): 56.9 ML/M^2
BH CV ECHO MEAS - SI(LVOT): 55.2 ML/M^2
BH CV ECHO MEAS - SI(MOD-SP2): 23.4 ML/M^2
BH CV ECHO MEAS - SI(MOD-SP4): 23.4 ML/M^2
BH CV ECHO MEAS - SI(TEICH): 49.5 ML/M^2
BH CV ECHO MEAS - SV(CUBED): 80.2 ML
BH CV ECHO MEAS - SV(LVOT): 77.8 ML
BH CV ECHO MEAS - SV(MOD-SP2): 33 ML
BH CV ECHO MEAS - SV(MOD-SP4): 33 ML
BH CV ECHO MEAS - SV(RVOT): 44.8 ML
BH CV ECHO MEAS - SV(TEICH): 69.7 ML
BH CV ECHO MEAS - TR MAX VEL: 290.8 CM/SEC
BH CV ECHO MEASUREMENTS AVERAGE E/E' RATIO: 11.61
BH CV XLRA - TDI S': 16.8 CM/SEC
LEFT ATRIUM VOLUME INDEX: 35 ML/M2
MAXIMAL PREDICTED HEART RATE: 135 BPM
SINUS: 2.8 CM
STJ: 2 CM
STRESS TARGET HR: 115 BPM

## 2021-08-13 PROCEDURE — 93306 TTE W/DOPPLER COMPLETE: CPT | Performed by: INTERNAL MEDICINE

## 2021-08-13 PROCEDURE — 99214 OFFICE O/P EST MOD 30 MIN: CPT | Performed by: NURSE PRACTITIONER

## 2021-08-13 PROCEDURE — 93306 TTE W/DOPPLER COMPLETE: CPT

## 2021-08-13 RX ORDER — DIPHENOXYLATE HYDROCHLORIDE AND ATROPINE SULFATE 2.5; .025 MG/1; MG/1
TABLET ORAL
COMMUNITY

## 2021-08-13 NOTE — PROGRESS NOTES
Date of Office Visit: 2021  Encounter Provider: KIMBER Underwood  Place of Service: [facility] CARDIOLOGY  Patient Name: Gely Carlisle  :1935  Primary Cardiologist: Dr. Jennifer Brown     Chief Complaint   Patient presents with   • Mitral Insufficiency   • Follow-up   :     HPI: Gely Carlisle is a pleasant 85 y.o. female who presents today for follow up on PVCs and mitral regurgitation. I have reviewed her medical records.     She has severe lung disease with bronchiectasis (on chronic steroids), chronic lung infections, asthma, and COPD followed by Dr. Marv Hudson.  She has adrenal insufficiency due to chronic steroid use.  She has been diagnosed with type 2 diabetes, thyroid disease, and hyperlipidemia.      She has previously followed with Dr. Elpidio Montemayor for history of mitral valve prolapse and mitral regurgitation.  In 2020, echocardiogram showed the following: LVEF 67%, grade 1 diastolic dysfunction, aortic valve calcification, mild to moderate LVH, and mild to moderate mitral insufficiency.    In 2020, she establish care with Dr. Brown.  EKG that day showed new PVCs and she was having dyspnea, fatigue, and her heart was pounding.  A 48-hour Holter monitor revealed 17% PVC burden and she was started on diltiazem.  Dr. Brown felt the PVCs were related to her lung disease, adrenal insufficiency, and thyroid disease -not due to her mitral regurgitation.    On 12/10/2020 she established care with Dr. Jennifer Avila cardiologist.  Her EKG showed new PVCs.  She reported more difficulty with breathing, fatigue, and her heart was pounding.  Dr. Brown ordered a 48-hour Holter monitor and started her on diltiazem 15 mg twice per day.  She felt that these PVCs may be related to her lung disease, adrenal insufficiency, and thyroid abnormalities.  She did not feel that the PVCs were related to her mitral  "insufficiency.    She presents today for a follow-up visit with her daughter accompanying her.  She had a 2D echocardiogram completed before this visit and the results are pending. She continues to have lower extremity edema and she says \"I expected complete resolution of my swelling.\" Some days her edema is better than other days. She wraps her legs herself.  She continues to experience shortness of breath and a cough.  She denies chest pain, palpitations, dizziness, or syncope.  Her blood pressure and heart rate are both stable today.  Her weight is down 3 pounds from last visit.    Past Medical History:   Diagnosis Date   • Asthma    • Bronchiectasis (CMS/HCC)    • Cancer (CMS/HCC)     cervical and skin   • Colitis    • COPD (chronic obstructive pulmonary disease) (CMS/HCC)    • Diabetes mellitus (CMS/HCC)     hyperglycemia   • Disease of thyroid gland     hypothyroidism   • Essential hypertension 8/24/2021   • History of transfusion    • Hyperlipidemia    • Insufficiency, adrenal (CMS/HCC)    • Leukocytosis    • Lymphedema    • MR (mitral regurgitation)    • MVP (mitral valve prolapse)    • Osteoarthritis    • Osteoporosis    • Scoliosis    • Urinary tract infection        Past Surgical History:   Procedure Laterality Date   • CATARACT EXTRACTION     • CHOLECYSTECTOMY      WITH COMMON BILE DUCT EXPLORATION REMOVAL OF STONE   • COLON SURGERY  1983    RUPTURED DIVERTICULI  DYLAN WORLEY MD   • HERNIA REPAIR  2012    ABDOMINAL LEFT UPPER   • HYSTERECTOMY     • HYSTERECTOMY     • JOINT REPLACEMENT Right     hip  2002 DR BROWN   • LUNG LOBECTOMY Left 1955    BRONCHIECTESIS LOWER LOBE   • TONSILLECTOMY      with adennoidectomy       Social History     Socioeconomic History   • Marital status:      Spouse name: Not on file   • Number of children: Not on file   • Years of education: Not on file   • Highest education level: Not on file   Tobacco Use   • Smoking status: Passive Smoke Exposure - Never Smoker   • " Smokeless tobacco: Never Used   Vaping Use   • Vaping Use: Never used   Substance and Sexual Activity   • Alcohol use: No     Comment: CAFFEINE USE: Decaf only   • Drug use: No   • Sexual activity: Never       Family History   Problem Relation Age of Onset   • Heart disease Mother    • Heart attack Mother    • Cancer Sister         BREAST   • No Known Problems Father    • Heart attack Maternal Grandmother    • Heart disease Maternal Grandmother    • Stroke Maternal Grandfather    • No Known Problems Paternal Grandmother    • No Known Problems Paternal Grandfather        The following portion of the patient's history were reviewed and updated as appropriate: past medical history, past surgical history, past social history, past family history, allergies, current medications, and problem list.    Review of Systems   Constitutional: Positive for malaise/fatigue.   Cardiovascular: Positive for dyspnea on exertion and leg swelling. Negative for palpitations.   Respiratory: Positive for cough and shortness of breath.    Hematologic/Lymphatic: Negative.    Neurological: Negative.        Allergies   Allergen Reactions   • Codeine Shortness Of Breath   • Demerol [Meperidine] Shortness Of Breath   • Morphine And Related Shortness Of Breath   • Aminophylline Arrhythmia   • Avelox [Moxifloxacin Hcl In Nacl] Arrhythmia   • Sulfa Antibiotics Hives   • Benadryl Allergy [Diphenhydramine Hcl (Sleep)] Other (See Comments)     Unknown     • Biaxin [Clarithromycin] Other (See Comments)     Unknown reaction   • Latex Rash   • Levaquin [Levofloxacin In D5w] Arrhythmia   • Macrobid [Nitrofurantoin Monohyd Macro] Nausea And Vomiting   • Penicillins Rash         Current Outpatient Medications:   •  ADVAIR DISKUS 250-50 MCG/DOSE DISKUS, INHALE 1 PUFF BID, Disp: , Rfl: 11  •  albuterol (PROVENTIL) (2.5 MG/3ML) 0.083% nebulizer solution, Take 2.5 mg by nebulization every 4 (four) hours as needed for wheezing., Disp: , Rfl:   •  calcium  carbonate (OS-RADHA) 600 MG tablet, Take 600 mg by mouth 2 (two) times a day with meals., Disp: , Rfl:   •  cetirizine (ZyrTEC) 10 MG tablet, Take 10 mg by mouth Daily., Disp: , Rfl:   •  CONTOUR NEXT TEST test strip, TEST D, Disp: , Rfl: 12  •  Cranberry 1000 MG capsule, Take 1 tablet by mouth Daily., Disp: , Rfl:   •  Cyanocobalamin (VITAMIN B12 SL), Place 1 tablet under the tongue Daily., Disp: , Rfl:   •  dilTIAZem (Cardizem) 30 MG tablet, Take 0.5 tablets by mouth 2 (two) times a day., Disp: 90 tablet, Rfl: 3  •  furosemide (LASIX) 20 MG tablet, Take 1 tablet by mouth Every Morning., Disp: 30 tablet, Rfl: 11  •  Glucosamine-Chondroitin (GLUCOSAMINE CHONDROITIN COMPLX) 500-250 MG capsule, Take 3 capsules by mouth 2 (Two) Times a Day., Disp: , Rfl:   •  guaiFENesin (MUCINEX) 600 MG 12 hr tablet, Take 1,200 mg by mouth 2 (two) times a day., Disp: , Rfl:   •  HYDROcodone-acetaminophen (NORCO) 5-325 MG per tablet, Take 1 tablet by mouth Every 4 (Four) Hours As Needed for Moderate Pain  or Severe Pain ., Disp: 30 tablet, Rfl: 0  •  levothyroxine (SYNTHROID, LEVOTHROID) 75 MCG tablet, Take 75 mcg by mouth daily., Disp: , Rfl:   •  montelukast (SINGULAIR) 10 MG tablet, Take 10 mg by mouth every night., Disp: , Rfl:   •  multivitamin (Multi Vitamin Daily) tablet tablet, Take  by mouth., Disp: , Rfl:   •  THOMAS-POLYCIN 3.5-400-92770 ophthalmic ointment, APPLY 1/4 INCH STRIP INTO OU BID FOR 2 WEEKS, Disp: , Rfl: 2  •  Probiotic Product (VSL#3 DS PO), Take 1 capsule by mouth Daily with lunch and dinner., Disp: , Rfl:   •  risedronate (ACTONEL) 35 MG tablet, Take 35 mg by mouth Every 7 (Seven) Days. with water on empty stomach, nothing by mouth or lie down for next 30 minutes. Takes on Monday., Disp: , Rfl:   •  rosuvastatin (CRESTOR) 20 MG tablet, Take  by mouth Daily., Disp: , Rfl: 6  •  sodium chloride 7 % nebulizer solution nebulizer solution, USE 1 VIAL PER NEBULIZER TWICE A DAY, Disp: , Rfl: 3  •  tiotropium (SPIRIVA) 18  "MCG per inhalation capsule, Place 1 capsule into inhaler and inhale 1 (one) time daily. Patient takes the handi mist, Disp: , Rfl:         Objective:     Vitals:    08/13/21 1128   BP: 112/82   BP Location: Right arm   Patient Position: Sitting   Cuff Size: Adult   Weight: 41.7 kg (92 lb)   Height: 160 cm (63\")     Body mass index is 16.3 kg/m².    PHYSICAL EXAM:    Vitals Reviewed.   General Appearance: Elderly thin and frail.  Eyes: Conjunctiva and lids: No erythema, swelling, or discharge. Sclera non-icteric.   HENT: Atraumatic, normocephalic. External eyes, ears, and nose normal. No hearing loss noted. Mucous membranes normal. Lips not cyanotic. Neck supple with no tenderness. Masked.   Respiratory: No signs of respiratory distress. Respiration rhythm and depth normal.   Clear to auscultation. No rales, crackles, rhonchi, or wheezing auscultated.   Cardiovascular:  Jugular Venous Pressure: Normal  Heart Rate and Rhythm: Normal, Heart Sounds: Normal S1 and S2. No S3 or S4 noted.  Murmurs: No murmurs noted. No rubs, thrills, or gallops.   Lower Extremities: Bilateral trace lower extremity edema noted. Legs wrapped.   Gastrointestinal:  Abdomen soft, non-distended, non-tender. Normal bowel sounds.    Musculoskeletal: Normal movement of extremities  Skin and Nails: General appearance normal. No pallor, cyanosis, diaphoresis. Skin temperature normal. No clubbing of fingernails.   Psychiatric: Patient alert and oriented to person, place, and time. Speech and behavior appropriate. Normal mood and affect.       ECG 12 Lead    Date/Time: 8/16/2021 11:32 AM  Performed by: Tequila Portillo APRN  Authorized by: Tequila Portillo APRN   Comparison: compared with previous ECG from 7/1/2021  Comparison to previous ECG: Sinus rhythm with ventricular bigeminy  Rhythm: sinus rhythm  Rate: normal  BPM: 69  Conduction: left anterior fascicular block  ST Segments: ST segments normal  T Waves: T waves normal  QRS axis: " normal    Clinical impression: abnormal EKG              Assessment:       Diagnosis Plan   1. Nonrheumatic mitral valve regurgitation     2. PVC (premature ventricular contraction)  ECG 12 Lead   3. Lower extremity edema  CBC (No Diff)    Comprehensive Metabolic Panel    TSH    proBNP    Ambulatory Referral to Lymphedema Clinic   4. Shortness of breath  proBNP   5. Chronic bronchitis, unspecified chronic bronchitis type (CMS/HCC)     6. Bronchiectasis without complication (CMS/HCC)     7. Chronic adrenal insufficiency (CMS/HCC)     8. Acquired hypothyroidism            Plan:       1.  Mitral Regurgitation: Mild to moderate in the past.  Echocardiogram results pending.    2.  PVCs: She had a 17% PVC burden noted on Holter monitor in December 2020.  She was started on diltiazem which could be contributing to worsening lower extremity edema.  She was not felt to be a good candidate for beta-blocker therapy due to her severe lung disease.  She denies palpitations.    3.  Lower Extremity Edema: She is doing leg wrapping herself.  I recommended a referral to lymphedema clinic.  She was hopeful with the start of the furosemide that she would have complete resolution of her lower extremity edema.  She does not want to increase the furosemide.    4-6.  Lung Disease: Follows with Dr. Hudson.    7/8.  Adrenal Insufficiency and Thyroid Disease: Follows with Dr. Camryn Szymanski.    9.  Further recommendations to follow after review the echocardiogram.  I have also ordered CBC, CMP, proBNP, and TSH to be completed.    ADDENDUM:  · Echocardiogram showed normal LVEF, mild aortic valve regurgitation mild aortic valve calcification, moderate mitral annular calcification, moderate mitral regurgitation, mild mitral valve stenosis, and mild pulmonary hypertension.  · Dr. Brown reviewed the echocardiogram and said it was stable.  Patient informed.    As always, it has been a pleasure to participate in your patient's care. Thank you.        Sincerely,       KIMBER Quan  [facility]      · COVID-19 Precautions - Patient was compliant in wearing a mask. When I saw the patient, I used appropriate personal protective equipment (PPE) including mask and eye shield (standard procedure).  Additionally, I used gown and gloves if indicated.  Hand hygiene was completed before and after seeing the patient.  · Dictated utilizing Dragon Dictation

## 2021-08-16 PROCEDURE — 93000 ELECTROCARDIOGRAM COMPLETE: CPT | Performed by: NURSE PRACTITIONER

## 2021-08-17 ENCOUNTER — TELEPHONE (OUTPATIENT)
Dept: CARDIOLOGY | Facility: CLINIC | Age: 86
End: 2021-08-17

## 2021-08-17 NOTE — TELEPHONE ENCOUNTER
8/17/2021: Pt called today and left a voice message about her medication list being wrong, Pt also is needing her lab orders that was placed  8/13/2021, Pt had also mentioned she had another Pt's chart Natty Alicia, Pt would like a call back as soon as possible.    8/17/2021: I called Pt back at 2:30 unfortunately she did not answer, will try again later today    AA

## 2021-08-17 NOTE — TELEPHONE ENCOUNTER
Results and recommendations reviewed with the patient. Patient verbalized understanding. Denied further questions at this time.    Amira Robles RN  Triage [place]

## 2021-08-24 PROBLEM — E78.5 HYPERLIPIDEMIA: Status: ACTIVE | Noted: 2021-08-24

## 2021-08-24 PROBLEM — J47.9 BRONCHIECTASIS (HCC): Status: ACTIVE | Noted: 2021-06-24

## 2021-08-24 PROBLEM — I10 ESSENTIAL HYPERTENSION: Status: ACTIVE | Noted: 2021-08-24

## 2021-08-24 PROBLEM — E27.49 GLUCOCORTICOID DEFICIENCY: Status: ACTIVE | Noted: 2021-08-24

## 2021-08-24 PROBLEM — E55.9 VITAMIN D DEFICIENCY: Status: ACTIVE | Noted: 2021-08-24

## 2021-08-24 PROBLEM — I49.3 VENTRICULAR PREMATURE BEATS: Status: ACTIVE | Noted: 2021-01-14

## 2021-08-24 PROBLEM — J47.9 BRONCHIECTASIS (HCC): Status: RESOLVED | Noted: 2021-06-24 | Resolved: 2021-08-24

## 2021-08-25 NOTE — TELEPHONE ENCOUNTER
I returned patient's phone call.  She wanted to let me know that she has not had the blood work completed yet and will do so in the near future.  Her daughter drives her and has not been able to take her to get the blood work.  She also received an after visit summary that had someone else's name and she sent it back to our office.  Her medication list is correct.

## 2021-09-12 ENCOUNTER — TELEPHONE (OUTPATIENT)
Dept: CARDIOLOGY | Facility: CLINIC | Age: 86
End: 2021-09-12

## 2021-09-12 NOTE — TELEPHONE ENCOUNTER
She called stating her legs had been weeping. She said they were not currently. I advised her to continue to wrap her legs, and elevated. She was advised to call the office on Monday for an appointment. She verbalized understanding.    KIMBER Carty

## 2021-09-27 ENCOUNTER — DOCUMENTATION (OUTPATIENT)
Dept: CARDIOLOGY | Facility: CLINIC | Age: 86
End: 2021-09-27

## 2021-09-27 ENCOUNTER — APPOINTMENT (OUTPATIENT)
Dept: PHYSICAL THERAPY | Facility: HOSPITAL | Age: 86
End: 2021-09-27

## 2021-09-27 NOTE — PROGRESS NOTES
I placed a referral to the lymphedema clinic and was checking on the referral. Two appointments have been made.One was cancelled by provider and the other by patient. She has been suffering from leg weeping and edema. Hopefully, she will reschedule her appointment.

## 2021-09-29 ENCOUNTER — APPOINTMENT (OUTPATIENT)
Dept: PHYSICAL THERAPY | Facility: HOSPITAL | Age: 86
End: 2021-09-29

## 2021-11-15 ENCOUNTER — TELEPHONE (OUTPATIENT)
Dept: FAMILY MEDICINE CLINIC | Facility: CLINIC | Age: 86
End: 2021-11-15

## 2021-11-15 NOTE — TELEPHONE ENCOUNTER
Per Dr. Paredes pt is over due for a visit.  She also needs to be scheduled for a Medicare Wellness.  Being on Hydrocodone needs an appointment every 3 months.  Called pt and left message on machine to schedule an  Appointment. Hub please inform patient

## 2021-11-16 DIAGNOSIS — M41.9 ACQUIRED KYPHOSCOLIOSIS: ICD-10-CM

## 2021-11-16 RX ORDER — HYDROCODONE BITARTRATE AND ACETAMINOPHEN 5; 325 MG/1; MG/1
1 TABLET ORAL EVERY 4 HOURS PRN
Qty: 30 TABLET | Refills: 0 | Status: SHIPPED | OUTPATIENT
Start: 2021-11-16 | End: 2021-12-30 | Stop reason: SDUPTHER

## 2021-11-16 NOTE — TELEPHONE ENCOUNTER
Rx Refill Note  Requested Prescriptions      No prescriptions requested or ordered in this encounter      Last office visit with prescribing clinician: 4/9/2021      Next office visit with prescribing clinician: 11/29/2021            Madiha Artis MA  11/16/21, 10:52 EST

## 2021-11-17 ENCOUNTER — TELEPHONE (OUTPATIENT)
Dept: CARDIOLOGY | Facility: CLINIC | Age: 86
End: 2021-11-17

## 2021-11-18 NOTE — TELEPHONE ENCOUNTER
Relayed message to pt, she is immunosuppressed and has reservations about coming to the lab, assured pt she will not be in contact with to many ppl as the lab is located on the first floor very close to the entrance.    AKIN

## 2021-11-18 NOTE — TELEPHONE ENCOUNTER
She is overdue for blood work that was ordered in August. Can she go to the main lab at [facility] to have these completed? Thanks.

## 2021-11-29 ENCOUNTER — OFFICE VISIT (OUTPATIENT)
Dept: FAMILY MEDICINE CLINIC | Facility: CLINIC | Age: 86
End: 2021-11-29

## 2021-11-29 VITALS
TEMPERATURE: 96.6 F | HEIGHT: 63 IN | OXYGEN SATURATION: 96 % | SYSTOLIC BLOOD PRESSURE: 110 MMHG | HEART RATE: 86 BPM | WEIGHT: 90.8 LBS | DIASTOLIC BLOOD PRESSURE: 70 MMHG | RESPIRATION RATE: 20 BRPM | BODY MASS INDEX: 16.09 KG/M2

## 2021-11-29 DIAGNOSIS — D72.829 LEUKOCYTOSIS, UNSPECIFIED TYPE: ICD-10-CM

## 2021-11-29 DIAGNOSIS — E78.5 HYPERLIPIDEMIA, UNSPECIFIED HYPERLIPIDEMIA TYPE: ICD-10-CM

## 2021-11-29 DIAGNOSIS — M81.8 OTHER OSTEOPOROSIS WITHOUT CURRENT PATHOLOGICAL FRACTURE: ICD-10-CM

## 2021-11-29 DIAGNOSIS — I10 ESSENTIAL HYPERTENSION: ICD-10-CM

## 2021-11-29 DIAGNOSIS — E03.9 ACQUIRED HYPOTHYROIDISM: ICD-10-CM

## 2021-11-29 DIAGNOSIS — E11.9 TYPE 2 DIABETES MELLITUS WITHOUT COMPLICATION, WITHOUT LONG-TERM CURRENT USE OF INSULIN (HCC): ICD-10-CM

## 2021-11-29 DIAGNOSIS — Z00.00 MEDICARE ANNUAL WELLNESS VISIT, SUBSEQUENT: Primary | ICD-10-CM

## 2021-11-29 DIAGNOSIS — J47.9 BRONCHIECTASIS WITHOUT COMPLICATION (HCC): ICD-10-CM

## 2021-11-29 DIAGNOSIS — E27.40 CHRONIC ADRENAL INSUFFICIENCY (HCC): ICD-10-CM

## 2021-11-29 DIAGNOSIS — Z79.899 HIGH RISK MEDICATION USE: ICD-10-CM

## 2021-11-29 DIAGNOSIS — I50.9 CONGESTIVE HEART FAILURE, UNSPECIFIED HF CHRONICITY, UNSPECIFIED HEART FAILURE TYPE (HCC): ICD-10-CM

## 2021-11-29 DIAGNOSIS — J42 CHRONIC BRONCHITIS, UNSPECIFIED CHRONIC BRONCHITIS TYPE (HCC): ICD-10-CM

## 2021-11-29 DIAGNOSIS — Z23 NEED FOR INFLUENZA VACCINATION: ICD-10-CM

## 2021-11-29 PROCEDURE — 1170F FXNL STATUS ASSESSED: CPT | Performed by: FAMILY MEDICINE

## 2021-11-29 PROCEDURE — G0008 ADMIN INFLUENZA VIRUS VAC: HCPCS | Performed by: FAMILY MEDICINE

## 2021-11-29 PROCEDURE — G0439 PPPS, SUBSEQ VISIT: HCPCS | Performed by: FAMILY MEDICINE

## 2021-11-29 PROCEDURE — 90662 IIV NO PRSV INCREASED AG IM: CPT | Performed by: FAMILY MEDICINE

## 2021-11-29 PROCEDURE — 1159F MED LIST DOCD IN RCRD: CPT | Performed by: FAMILY MEDICINE

## 2021-11-29 NOTE — PROGRESS NOTES
The ABCs of the Annual Wellness Visit  Subsequent Medicare Wellness Visit    Chief Complaint   Patient presents with   • Medicare Wellness-subsequent      Subjective    History of Present Illness:  Gely Carlisle is a 86 y.o. female who presents for a Subsequent Medicare Wellness Visit.    The following portions of the patient's history were reviewed and   updated as appropriate: past family history, past social history and past surgical history.    Compared to one year ago, the patient feels her physical   health is worse.    Compared to one year ago, the patient feels her mental   health is better.    Recent Hospitalizations:  She was not admitted to the hospital during the last year.       Current Medical Providers:  Patient Care Team:  Arnaud Paredes MD as PCP - General (Family Medicine)  Self, Camryn BALLARD MD as Consulting Physician (Endocrinology)  Jennifer Brown MD as Consulting Physician (Cardiology)  Tequila Portillo APRN as Nurse Practitioner (Cardiology)    Outpatient Medications Prior to Visit   Medication Sig Dispense Refill   • ADVAIR DISKUS 250-50 MCG/DOSE DISKUS INHALE 1 PUFF BID  11   • albuterol (PROVENTIL) (2.5 MG/3ML) 0.083% nebulizer solution Take 2.5 mg by nebulization every 4 (four) hours as needed for wheezing.     • calcium carbonate (OS-RADHA) 600 MG tablet Take 600 mg by mouth 2 (two) times a day with meals.     • cetirizine (ZyrTEC) 10 MG tablet Take 10 mg by mouth Daily.     • CONTOUR NEXT TEST test strip TEST D  12   • Cranberry 1000 MG capsule Take 1 tablet by mouth Daily.     • Cyanocobalamin (VITAMIN B12 SL) Place 1 tablet under the tongue Daily.     • dilTIAZem (CARDIZEM) 30 MG tablet TAKE 1/2 TABLET BY MOUTH TWICE DAILY 90 tablet 3   • furosemide (LASIX) 20 MG tablet Take 1 tablet by mouth Every Morning. 30 tablet 11   • Glucosamine-Chondroitin (GLUCOSAMINE CHONDROITIN COMPLX) 500-250 MG capsule Take 3 capsules by mouth 2 (Two) Times a Day.     • guaiFENesin (MUCINEX) 600  MG 12 hr tablet Take 1,200 mg by mouth 2 (two) times a day.     • HYDROcodone-acetaminophen (NORCO) 5-325 MG per tablet Take 1 tablet by mouth Every 4 (Four) Hours As Needed for Moderate Pain  or Severe Pain . 30 tablet 0   • levothyroxine (SYNTHROID, LEVOTHROID) 75 MCG tablet Take 75 mcg by mouth daily.     • montelukast (SINGULAIR) 10 MG tablet Take 10 mg by mouth every night.     • multivitamin (Multi Vitamin Daily) tablet tablet Take  by mouth.     • THOMAS-POLYCIN [id number] ophthalmic ointment APPLY 1/4 INCH STRIP INTO OU BID FOR 2 WEEKS  2   • Probiotic Product (VSL#3 DS PO) Take 1 capsule by mouth Daily with lunch and dinner.     • risedronate (ACTONEL) 35 MG tablet Take 35 mg by mouth Every 7 (Seven) Days. with water on empty stomach, nothing by mouth or lie down for next 30 minutes. Takes on Monday.     • rosuvastatin (CRESTOR) 20 MG tablet Take  by mouth Daily.  6   • sodium chloride 7 % nebulizer solution nebulizer solution USE 1 VIAL PER NEBULIZER TWICE A DAY  3   • tiotropium (SPIRIVA) 18 MCG per inhalation capsule Place 1 capsule into inhaler and inhale 1 (one) time daily. Patient takes the handi mist       No facility-administered medications prior to visit.       Opioid medication/s are on active medication list.  and I have evaluated her active treatment plan and pain score trends (see table).  Vitals:    11/29/21 1141   PainSc: 0-No pain     I have reviewed the chart for potential of high risk medication and harmful drug interactions in the elderly.            Aspirin is not on active medication list.  Aspirin use is not indicated based on review of current medical condition/s. Risk of harm outweighs potential benefits.  .    Patient Active Problem List   Diagnosis   • Billowing mitral valve   • Hypokalemia   • Bronchiectasis - on chronic steroid therapy   • Chronic adrenal insufficiency (HCC)   • COPD (chronic obstructive pulmonary disease) (HCC)   • Lymphedema   • Osteoporosis   • Recurrent UTI  "  • Knee effusion, left   • Pseudogout of left knee   • Leukocytosis   • Edema of lower extremity   • Acquired hypothyroidism   • Uncomplicated asthma   • Hypercholesterolemia   • Type 2 diabetes mellitus without complication, without long-term current use of insulin (HCC)   • Dyspnea   • Mitral valve regurgitation   • PVC (premature ventricular contraction)   • Essential hypertension   • Vitamin D deficiency   • Glucocorticoid deficiency (HCC)   • Hyperlipidemia     Advance Care Planning  Advance Directive is not on file.  ACP discussion was held with the patient during this visit. Patient has an advance directive (not in EMR), copy requested.    Review of Systems   Respiratory: Positive for shortness of breath.    Musculoskeletal: Positive for arthralgias.   All other systems reviewed and are negative.       Objective    Vitals:    11/29/21 1141   BP: 110/70   BP Location: Left arm   Pulse: 86   Resp: 20   Temp: 96.6 °F (35.9 °C)   TempSrc: Temporal   SpO2: 96%   Weight: 41.2 kg (90 lb 12.8 oz)   Height: 160 cm (63\")   PainSc: 0-No pain     BMI Readings from Last 1 Encounters:   11/29/21 16.08 kg/m²   BMI is below normal parameters. Recommendations include: none (medical contraindication) and treating the underlying disease process    Does the patient have evidence of cognitive impairment? No    Physical Exam  Vitals and nursing note reviewed.   Constitutional:       General: She is not in acute distress.     Appearance: She is well-developed. She is ill-appearing.   HENT:      Head: Normocephalic and atraumatic.      Nose:      Comments: Patient and daughter with mask.  Provider with mask and shield  Eyes:      Extraocular Movements: Extraocular movements intact.      Conjunctiva/sclera: Conjunctivae normal.      Pupils: Pupils are equal, round, and reactive to light.   Neck:      Thyroid: No thyromegaly.   Cardiovascular:      Rate and Rhythm: Normal rate and regular rhythm.      Heart sounds: Normal heart " sounds.   Pulmonary:      Effort: Pulmonary effort is normal. No respiratory distress.      Breath sounds: Normal breath sounds.   Abdominal:      General: There is no distension.      Palpations: There is no mass.      Tenderness: There is no abdominal tenderness.      Hernia: No hernia is present.   Musculoskeletal:         General: No tenderness or deformity. Normal range of motion.      Cervical back: Normal range of motion.   Lymphadenopathy:      Cervical: No cervical adenopathy.   Skin:     General: Skin is warm and dry.      Coloration: Skin is not pale.      Findings: No rash.   Neurological:      General: No focal deficit present.      Mental Status: She is alert and oriented to person, place, and time.      Motor: No abnormal muscle tone.      Coordination: Coordination normal.   Psychiatric:         Mood and Affect: Mood normal.         Behavior: Behavior normal.         Thought Content: Thought content normal.         Judgment: Judgment normal.                 HEALTH RISK ASSESSMENT    Smoking Status:  Social History     Tobacco Use   Smoking Status Passive Smoke Exposure - Never Smoker   Smokeless Tobacco Never Used     Alcohol Consumption:  Social History     Substance and Sexual Activity   Alcohol Use No    Comment: CAFFEINE USE: Decaf only     Fall Risk Screen:    [facility], and patient is at MODERATE risk for falls. Assessment completed on:11/29/2021    Depression Screening:  PHQ-2/PHQ-9 Depression Screening 11/29/2021   Little interest or pleasure in doing things 0   Feeling down, depressed, or hopeless 0   Trouble falling or staying asleep, or sleeping too much 0   Feeling tired or having little energy 2   Poor appetite or overeating 2   Feeling bad about yourself - or that you are a failure or have let yourself or your family down 0   Trouble concentrating on things, such as reading the newspaper or watching television 0   Moving or speaking so slowly that other  people could have noticed. Or the opposite - being so fidgety or restless that you have been moving around a lot more than usual 0   Thoughts that you would be better off dead, or of hurting yourself in some way 0   Total Score 4   If you checked off any problems, how difficult have these problems made it for you to do your work, take care of things at home, or get along with other people? Not difficult at all       Health Habits and Functional and Cognitive Screening:  Functional & Cognitive Status 11/29/2021   Do you have difficulty preparing food and eating? No   Do you have difficulty bathing yourself, getting dressed or grooming yourself? No   Do you have difficulty using the toilet? No   Do you have difficulty moving around from place to place? No   Do you have trouble with steps or getting out of a bed or a chair? Yes   Current Diet Unhealthy Diet   Dental Exam Not up to date   Eye Exam Up to date   Exercise (times per week) 0 times per week   Current Exercises Include No Regular Exercise   Current Exercise Activities Include -   Do you need help using the phone?  No   Are you deaf or do you have serious difficulty hearing?  No   Do you need help with transportation? Yes   Do you need help shopping? Yes   Do you need help preparing meals?  Yes   Do you need help with housework?  Yes   Do you need help with laundry? No   Do you need help taking your medications? No   Do you need help managing money? No   Do you ever drive or ride in a car without wearing a seat belt? No   Have you felt unusual stress, anger or loneliness in the last month? No   Who do you live with? Alone   If you need help, do you have trouble finding someone available to you? No   Have you been bothered in the last four weeks by sexual problems? No   Do you have difficulty concentrating, remembering or making decisions? No       Age-appropriate Screening Schedule:  Refer to the list below for future screening recommendations based on  patient's age, sex and/or medical conditions. Orders for these recommended tests are listed in the plan section. The patient has been provided with a written plan.    Health Maintenance   Topic Date Due   • ZOSTER VACCINE (1 of 2) Never done   • URINE MICROALBUMIN  06/02/2021   • HEMOGLOBIN A1C  12/18/2021   • LIPID PANEL  06/18/2022   • DIABETIC EYE EXAM  09/27/2022   • TDAP/TD VACCINES (2 - Tdap) 09/17/2026   • INFLUENZA VACCINE  Completed   • MAMMOGRAM  Discontinued   • DXA SCAN  Discontinued              Assessment/Plan   Risk Factors Identified During Encounter  Cardiovascular Disease  Chronic Pain   Polypharmacy  The above risks/problems have been discussed with the patient.  Follow up actions/plans if indicated are seen below in the Assessment/Plan Section.  Pertinent information has been shared with the patient in the After Visit Summary.    Diagnoses and all orders for this visit:    1. Need for influenza vaccination (Primary)  -     Fluzone High-Dose 65+yrs    2. Medicare annual wellness visit, subsequent    3. Essential hypertension    4. Hyperlipidemia, unspecified hyperlipidemia type    5. Chronic adrenal insufficiency (HCC)    6. Type 2 diabetes mellitus without complication, without long-term current use of insulin (HCC)    7. Other osteoporosis without current pathological fracture    8. Chronic bronchitis, unspecified chronic bronchitis type (HCC)    9. Bronchiectasis without complication (HCC)        Follow Up:   Patient with multiple medical issues as well as annual Medicare wellness evaluation.  Missed last appointment because of exacerbation of COPD.  Controversy regarding third Covid vaccine.  Apparently was given Materna at pharmacy and was only given half dose as a booster.  Patient requesting full dose because of immunosuppressive disorder.  This was discussed and daughter apparently had a long discussion with the CDC and told to contact personal physician.  Also controversy regarding  pulmonary medication per pulmonary specialist.  Also followed by vascular specialist and cardiologist.  In fact is scheduled for repeat lab work Wednesday?  Extremely thin and cachectic.  Obviously very high risk patient.  Will continue current therapy and return for lab work as noted.  Otherwise keep routine follow-up appointment in March.    An After Visit Summary and PPPS were made available to the patient.

## 2021-12-06 ENCOUNTER — TELEPHONE (OUTPATIENT)
Dept: CARDIOLOGY | Facility: CLINIC | Age: 86
End: 2021-12-06

## 2021-12-06 NOTE — TELEPHONE ENCOUNTER
I reviewed blood work from PCP office.  Mild abnormalities noted, but stable.    Maribel-please arrange a follow-up appointment with Dr. Brown in August 2022.  Thank you

## 2021-12-30 DIAGNOSIS — M41.9 ACQUIRED KYPHOSCOLIOSIS: ICD-10-CM

## 2021-12-30 RX ORDER — HYDROCODONE BITARTRATE AND ACETAMINOPHEN 5; 325 MG/1; MG/1
1 TABLET ORAL EVERY 4 HOURS PRN
Qty: 30 TABLET | Refills: 0 | Status: SHIPPED | OUTPATIENT
Start: 2021-12-30 | End: 2022-01-01 | Stop reason: SDUPTHER

## 2021-12-30 NOTE — TELEPHONE ENCOUNTER
Rx Refill Note  Requested Prescriptions     Pending Prescriptions Disp Refills   • HYDROcodone-acetaminophen (NORCO) 5-325 MG per tablet 30 tablet 0     Sig: Take 1 tablet by mouth Every 4 (Four) Hours As Needed for Moderate Pain  or Severe Pain .      Last office visit with prescribing clinician: 11/29/2021      Next office visit with prescribing clinician: Visit date not found            Charline Martinez MA  12/30/21, 09:43 EST

## 2022-01-01 ENCOUNTER — OFFICE VISIT (OUTPATIENT)
Dept: CARDIOLOGY | Facility: CLINIC | Age: 87
End: 2022-01-01

## 2022-01-01 ENCOUNTER — OFFICE VISIT (OUTPATIENT)
Dept: FAMILY MEDICINE CLINIC | Facility: CLINIC | Age: 87
End: 2022-01-01

## 2022-01-01 ENCOUNTER — TRANSCRIBE ORDERS (OUTPATIENT)
Dept: ADMINISTRATIVE | Facility: HOSPITAL | Age: 87
End: 2022-01-01

## 2022-01-01 ENCOUNTER — TELEPHONE (OUTPATIENT)
Dept: CARDIOLOGY | Facility: CLINIC | Age: 87
End: 2022-01-01

## 2022-01-01 VITALS
WEIGHT: 69 LBS | BODY MASS INDEX: 12.23 KG/M2 | HEART RATE: 70 BPM | SYSTOLIC BLOOD PRESSURE: 142 MMHG | HEIGHT: 63 IN | DIASTOLIC BLOOD PRESSURE: 60 MMHG

## 2022-01-01 VITALS
HEART RATE: 92 BPM | DIASTOLIC BLOOD PRESSURE: 78 MMHG | SYSTOLIC BLOOD PRESSURE: 110 MMHG | HEIGHT: 63 IN | BODY MASS INDEX: 16.08 KG/M2

## 2022-01-01 DIAGNOSIS — E78.00 HYPERCHOLESTEROLEMIA: ICD-10-CM

## 2022-01-01 DIAGNOSIS — M15.9 GENERALIZED OSTEOARTHRITIS: Primary | ICD-10-CM

## 2022-01-01 DIAGNOSIS — M41.9 ACQUIRED KYPHOSCOLIOSIS: ICD-10-CM

## 2022-01-01 DIAGNOSIS — J42 CHRONIC BRONCHITIS, UNSPECIFIED CHRONIC BRONCHITIS TYPE: ICD-10-CM

## 2022-01-01 DIAGNOSIS — M71.21 BAKER'S CYST OF KNEE, RIGHT: ICD-10-CM

## 2022-01-01 DIAGNOSIS — R63.4 UNEXPLAINED WEIGHT LOSS: Primary | ICD-10-CM

## 2022-01-01 DIAGNOSIS — I10 ESSENTIAL HYPERTENSION: ICD-10-CM

## 2022-01-01 DIAGNOSIS — I34.0 NONRHEUMATIC MITRAL VALVE REGURGITATION: ICD-10-CM

## 2022-01-01 DIAGNOSIS — I34.1 BILLOWING MITRAL VALVE: ICD-10-CM

## 2022-01-01 DIAGNOSIS — J42 CHRONIC BRONCHITIS, UNSPECIFIED CHRONIC BRONCHITIS TYPE: Primary | ICD-10-CM

## 2022-01-01 DIAGNOSIS — Z53.21 PATIENT LEFT WITHOUT BEING SEEN: Primary | ICD-10-CM

## 2022-01-01 DIAGNOSIS — I34.0 NONRHEUMATIC MITRAL VALVE REGURGITATION: Primary | ICD-10-CM

## 2022-01-01 DIAGNOSIS — J47.9 BRONCHIECTASIS WITHOUT COMPLICATION: ICD-10-CM

## 2022-01-01 PROCEDURE — 99442 PR PHYS/QHP TELEPHONE EVALUATION 11-20 MIN: CPT | Performed by: FAMILY MEDICINE

## 2022-01-01 PROCEDURE — 99214 OFFICE O/P EST MOD 30 MIN: CPT | Performed by: INTERNAL MEDICINE

## 2022-01-01 PROCEDURE — 93000 ELECTROCARDIOGRAM COMPLETE: CPT | Performed by: INTERNAL MEDICINE

## 2022-01-01 RX ORDER — HYDROCODONE BITARTRATE AND ACETAMINOPHEN 5; 325 MG/1; MG/1
1 TABLET ORAL EVERY 4 HOURS PRN
Qty: 30 TABLET | Refills: 0 | Status: SHIPPED | OUTPATIENT
Start: 2022-01-01 | End: 2022-01-01 | Stop reason: SDUPTHER

## 2022-01-01 RX ORDER — PREDNISONE 20 MG/1
3 TABLET ORAL
COMMUNITY
End: 2022-01-01 | Stop reason: ALTCHOICE

## 2022-01-01 RX ORDER — METHYLPREDNISOLONE 4 MG/1
TABLET ORAL
COMMUNITY
Start: 2022-01-01 | End: 2022-01-01

## 2022-01-01 RX ORDER — HYDROCODONE BITARTRATE AND ACETAMINOPHEN 5; 325 MG/1; MG/1
1 TABLET ORAL EVERY 4 HOURS PRN
Qty: 40 TABLET | Refills: 0 | Status: SHIPPED | OUTPATIENT
Start: 2022-01-01

## 2022-01-01 RX ORDER — METHYLPREDNISOLONE 4 MG/1
4 TABLET ORAL DAILY
COMMUNITY

## 2022-02-25 NOTE — TELEPHONE ENCOUNTER
Rx Refill Note  Requested Prescriptions     Pending Prescriptions Disp Refills   • HYDROcodone-acetaminophen (NORCO) 5-325 MG per tablet 30 tablet 0     Sig: Take 1 tablet by mouth Every 4 (Four) Hours As Needed for Moderate Pain  or Severe Pain .      Last office visit with prescribing clinician: 11/29/2021      Next office visit with prescribing clinician: Visit date not found            Madiha Artis MA  02/25/22, 12:02 EST

## 2022-02-25 NOTE — TELEPHONE ENCOUNTER
PATIENT CALLED TO SPEAK TO JULIOCESAR,  PATIENT DID NOT WANT TO DISCLOSE THE NATURE OF HER CALL. SAID IT WAS VERY IMPORTANT TO TALK TO HER    CB#: [phone]

## 2022-03-14 NOTE — TELEPHONE ENCOUNTER
Patient called and stated that she dropped her pain medicine on the floor and she is immunocompromise and won't take them since they fell on the floor and is asking if you could send in some more.  Also she stated that she picked them up last week and she said that she would pay out of pocket for them.

## 2022-05-02 NOTE — TELEPHONE ENCOUNTER
Rx Refill Note  Requested Prescriptions     Pending Prescriptions Disp Refills   • HYDROcodone-acetaminophen (NORCO) 5-325 MG per tablet 30 tablet 0     Sig: Take 1 tablet by mouth Every 4 (Four) Hours As Needed for Moderate Pain  or Severe Pain .      Last office visit with prescribing clinician: 11/29/2021      Next office visit with prescribing clinician: 5/5/2022            Madiha Artis MA  05/02/22, 10:05 EDT

## 2022-05-05 NOTE — PROGRESS NOTES
HPI  Gely Carlisle is a 86 y.o. female who is here for telephone visit especially related to knee pain.  Apparently awoke with severe knee pain several days ago and is unable to come into the office.  Very concerned about having to find new primary care doctor.  Apparently has been on pain medication for many years as needed for arthritis and Baker's cyst in both knees.  All of this was discussed and in view of probable osteoarthritis will refer to orthopedist for continued care.  Apparently was seen by Dr. Duran in the past.      Review of Systems   Musculoskeletal: Positive for arthralgias and gait problem.   All other systems reviewed and are negative.        Past Medical History:   Diagnosis Date   • Asthma    • Bronchiectasis (HCC)    • Cancer (HCC)     cervical and skin   • Colitis    • COPD (chronic obstructive pulmonary disease) (HCC)    • Diabetes mellitus (HCC)     hyperglycemia   • Disease of thyroid gland     hypothyroidism   • Essential hypertension 8/24/2021   • History of transfusion    • Hyperlipidemia    • Insufficiency, adrenal (HCC)    • Leukocytosis    • Lymphedema    • MR (mitral regurgitation)    • MVP (mitral valve prolapse)    • Osteoarthritis    • Osteoporosis    • Scoliosis    • Urinary tract infection        Past Surgical History:   Procedure Laterality Date   • CATARACT EXTRACTION     • CHOLECYSTECTOMY      WITH COMMON BILE DUCT EXPLORATION REMOVAL OF STONE   • COLON SURGERY  1983    RUPTURED DIVERTICULI  DYLAN WORLEY MD   • HERNIA REPAIR  2012    ABDOMINAL LEFT UPPER   • HYSTERECTOMY     • HYSTERECTOMY     • JOINT REPLACEMENT Right     hip  2002 DR DURAN   • LUNG LOBECTOMY Left 1955    BRONCHIECTESIS LOWER LOBE   • TONSILLECTOMY      with adennoidectomy       Family History   Problem Relation Age of Onset   • Heart disease Mother    • Heart attack Mother    • Cancer Sister         BREAST   • No Known Problems Father    • Heart attack Maternal Grandmother    • Heart disease  Maternal Grandmother    • Stroke Maternal Grandfather    • No Known Problems Paternal Grandmother    • No Known Problems Paternal Grandfather        Social History     Socioeconomic History   • Marital status:    Tobacco Use   • Smoking status: Passive Smoke Exposure - Never Smoker   • Smokeless tobacco: Never Used   Vaping Use   • Vaping Use: Never used   Substance and Sexual Activity   • Alcohol use: No     Comment: CAFFEINE USE: Decaf only   • Drug use: No   • Sexual activity: Never       There were no vitals filed for this visit.     There is no height or weight on file to calculate BMI.      Physical Exam  Constitutional:       General: She is not in acute distress.  Neurological:      Mental Status: She is alert and oriented to person, place, and time.   Psychiatric:         Mood and Affect: Mood normal.         Thought Content: Thought content normal.         Judgment: Judgment normal.           Assessment/Plan    Diagnoses and all orders for this visit:    1. Generalized osteoarthritis (Primary)  -     Ambulatory Referral to Orthopedic Surgery    2. Baker's cyst of knee, right  -     Ambulatory Referral to Orthopedic Surgery    3. Chronic bronchitis, unspecified chronic bronchitis type (HCC)      Telephone visit for follow-up especially of Baker's cyst behind both knees.  Several days ago developed severe right knee pain and unable to come to the office for checkup.  Discussed with patient will need to find new primary care physician especially to continue pain medication as needed.  In view of Baker cysts and obvious arthritis as probable etiology recommend orthopedic referral.  Has been seen by [facility] in the past and will refer also for possible pain management.  Otherwise does need to make appointment to see me next month for routine 6-month checkup including lab work.    This visit has been rescheduled as a phone visit to comply with patient safety concerns in accordance with  CDC recommendations. Total time of discussion was 20 minutes.

## 2022-05-13 NOTE — PROGRESS NOTES
Patient left without being seen by the provider.  The patient left the office before care was provided and did not complete the visit because patient stated they were tired and did not want to wait. .

## 2022-06-23 NOTE — PROGRESS NOTES
HPI  Gely Carlisle is a 86 y.o. female who is here for telephone visit to continue pain medication.  Overall stable on current regimen and does not overuse nor abuse medication.  Brother followed by pain management but patient does not wish to go that route.  However informed patient will have to find a new primary care provider especially to continue medication etc.      Review of Systems   Musculoskeletal: Positive for arthralgias.   All other systems reviewed and are negative.        Past Medical History:   Diagnosis Date   • Asthma    • Bronchiectasis (HCC)    • Cancer (HCC)     cervical and skin   • Colitis    • COPD (chronic obstructive pulmonary disease) (HCC)    • Diabetes mellitus (HCC)     hyperglycemia   • Disease of thyroid gland     hypothyroidism   • Essential hypertension 8/24/2021   • History of transfusion    • Hyperlipidemia    • Insufficiency, adrenal (HCC)    • Leukocytosis    • Lymphedema    • MR (mitral regurgitation)    • MVP (mitral valve prolapse)    • Osteoarthritis    • Osteoporosis    • Scoliosis    • Urinary tract infection        Past Surgical History:   Procedure Laterality Date   • CATARACT EXTRACTION     • CHOLECYSTECTOMY      WITH COMMON BILE DUCT EXPLORATION REMOVAL OF STONE   • COLON SURGERY  1983    RUPTURED DIVERTICULI  DYLAN WORLEY MD   • HERNIA REPAIR  2012    ABDOMINAL LEFT UPPER   • HYSTERECTOMY     • HYSTERECTOMY     • JOINT REPLACEMENT Right     hip  2002 DR BROWN   • LUNG LOBECTOMY Left 1955    BRONCHIECTESIS LOWER LOBE   • TONSILLECTOMY      with adennoidectomy       Family History   Problem Relation Age of Onset   • Heart disease Mother    • Heart attack Mother    • Cancer Sister         BREAST   • No Known Problems Father    • Heart attack Maternal Grandmother    • Heart disease Maternal Grandmother    • Stroke Maternal Grandfather    • No Known Problems Paternal Grandmother    • No Known Problems Paternal Grandfather        Social History     Socioeconomic  History   • Marital status:    Tobacco Use   • Smoking status: Passive Smoke Exposure - Never Smoker   • Smokeless tobacco: Never Used   Vaping Use   • Vaping Use: Never used   Substance and Sexual Activity   • Alcohol use: No     Comment: CAFFEINE USE: Decaf only   • Drug use: No   • Sexual activity: Never       There were no vitals filed for this visit.     There is no height or weight on file to calculate BMI.      Physical Exam  Constitutional:       General: She is not in acute distress.  Neurological:      Mental Status: She is alert and oriented to person, place, and time.   Psychiatric:         Mood and Affect: Mood normal.         Behavior: Behavior normal.         Thought Content: Thought content normal.         Judgment: Judgment normal.           Assessment/Plan    Diagnoses and all orders for this visit:    1. Generalized osteoarthritis (Primary)    2. Acquired kyphoscoliosis  -     HYDROcodone-acetaminophen (NORCO) 5-325 MG per tablet; Take 1 tablet by mouth Every 4 (Four) Hours As Needed for Moderate Pain  or Severe Pain .  Dispense: 40 tablet; Refill: 0    3. Chronic bronchitis, unspecified chronic bronchitis type (HCC)      Telephone visit to continue current medications.  Again informed patient will need to find new provider to continue medications etc.  Referred to [facility] system to find a provider.  Brother followed by pain management as discussed above.    This visit has been rescheduled as a phone visit to comply with patient safety concerns in accordance with CDC recommendations. Total time of discussion was 15 minutes.

## 2022-09-29 NOTE — PROGRESS NOTES
Date of Office Visit: 2022  Encounter Provider: Jennifer Brown MD  Place of Service: [facility]  Patient Name: Gely Carlisle  :1935      Patient ID:  Gely Carlisle is a 87 y.o. female is here for  followup for mitral insufficiency, PVCs.        History of Present Illness    She has a history of mitral valve prolapse and mitral regurgitation. She had bronchiectasis with chronic lung infections.  She also has asthma and COPD.  She has adrenal insufficiency due to chronic steroid use.  She has diabetes mellitus type 2, gout, thyroid disease, history of cervical and skin cancer.  She has hyperlipidemia.  She had vascular screening done 2017 which showed normal abdominal aorta, normal carotid arteries.  ABIs could not be obtained     She is , has 3 children retired.  She uses no cigarettes, alcohol or drugs.  She has 2 cups of coffee per day.  Her daughter has hypertension and her mother had heart disease.     She follows with Dr. Hudson for her bronchiectasis and history of Pseudomonas infections.  She also COPD. She follows with Dr. Szymanski for for her thyroid and adrenal insufficiency.  She takes Medrol 4 mg daily for her adrenal insufficiency.      Echo done 2020 shows mild to moderate left ventricular hypertrophy with mild to moderate mitral insufficiency, calcification of the aortic valve, ejection fraction 67% with grade 1 diastolic dysfunction, normal chamber sizes.  She had all 48-hour Holter monitor done 2020 which showed frequent PVCs occurring 17% PVCs, no VT.      She is very frail.  She was very short winded in March and 2021 and thought she might not make it.  She was placed on high-dose steroids and Cipro.  She finally pulled out of it but still feels quite weak.  She has frequent PVCs.  Echo done 2020 ejection fraction 61-65% with normal diastolic function, small left ventricular cavity size, mild mitral  stenosis, moderate eccentric mitral insufficiency, mild aortic insufficiency, mildly elevated RVSP.  Labs in 12/2/2021 show normal CMP, hemoglobin A1c 6.4%, normal TSH and free T4, total cholesterol 240, HDL 76, , triglycerides 92, normal CBC.    She has severe cachexia.  She has no energy, she is no longer doing ADLs.  Her appetite is very poor and she never wants to eat.  She is short winded all the time.  She is very weak and fatigued.  She denies lightheadedness.  She has no orthopnea or PND.  She still lives in her own home.  Her mentation is clear and she is able to make her own decisions.  She is able to comprehend or talking about and make decisions based on this.  Her daughters are here with her today.    Past Medical History:   Diagnosis Date   • Asthma    • Bronchiectasis (HCC)    • Cancer (HCC)     cervical and skin   • Colitis    • COPD (chronic obstructive pulmonary disease) (HCC)    • Diabetes mellitus (HCC)     hyperglycemia   • Disease of thyroid gland     hypothyroidism   • Essential hypertension 8/24/2021   • History of transfusion    • Hyperlipidemia    • Insufficiency, adrenal (HCC)    • Leukocytosis    • Lymphedema    • MR (mitral regurgitation)    • MVP (mitral valve prolapse)    • Osteoarthritis    • Osteoporosis    • Scoliosis    • Urinary tract infection          Past Surgical History:   Procedure Laterality Date   • CATARACT EXTRACTION     • CHOLECYSTECTOMY      WITH COMMON BILE DUCT EXPLORATION REMOVAL OF STONE   • COLON SURGERY  1983    RUPTURED DIVERTICULI  DYLAN WORLEY MD   • HERNIA REPAIR  2012    ABDOMINAL LEFT UPPER   • HYSTERECTOMY     • HYSTERECTOMY     • JOINT REPLACEMENT Right     hip  2002 DR BROWN   • LUNG LOBECTOMY Left 1955    BRONCHIECTESIS LOWER LOBE   • TONSILLECTOMY      with adennoidectomy       Current Outpatient Medications on File Prior to Visit   Medication Sig Dispense Refill   • ADVAIR DISKUS 250-50 MCG/DOSE DISKUS INHALE 1 PUFF BID  11   • albuterol  (PROVENTIL) (2.5 MG/3ML) 0.083% nebulizer solution Take 2.5 mg by nebulization every 4 (four) hours as needed for wheezing.     • cetirizine (ZyrTEC) 10 MG tablet Take 10 mg by mouth Daily.     • CONTOUR NEXT TEST test strip TEST D  12   • Cranberry 1000 MG capsule Take 1 tablet by mouth Daily.     • Cyanocobalamin (VITAMIN B12 SL) Place 1 tablet under the tongue Daily.     • dilTIAZem (CARDIZEM) 30 MG tablet TAKE 1/2 TABLET BY MOUTH TWICE DAILY 90 tablet 0   • furosemide (LASIX) 20 MG tablet Take 1 tablet by mouth Every Morning. 30 tablet 11   • guaiFENesin (MUCINEX) 600 MG 12 hr tablet Take 1,200 mg by mouth 2 (two) times a day.     • HYDROcodone-acetaminophen (NORCO) 5-325 MG per tablet Take 1 tablet by mouth Every 4 (Four) Hours As Needed for Moderate Pain  or Severe Pain . 40 tablet 0   • levothyroxine (SYNTHROID, LEVOTHROID) 75 MCG tablet Take 75 mcg by mouth daily.     • methylPREDNISolone (MEDROL) 4 MG tablet      • montelukast (SINGULAIR) 10 MG tablet Take 10 mg by mouth every night.     • multivitamin (THERAGRAN) tablet tablet Take  by mouth.     • Probiotic Product (VSL#3 DS PO) Take 1 capsule by mouth Daily with lunch and dinner.     • risedronate (ACTONEL) 35 MG tablet Take 35 mg by mouth Every 7 (Seven) Days. with water on empty stomach, nothing by mouth or lie down for next 30 minutes. Takes on Monday.     • sodium chloride 7 % nebulizer solution nebulizer solution USE 1 VIAL PER NEBULIZER TWICE A DAY  3   • tiotropium (SPIRIVA) 18 MCG per inhalation capsule Place 1 capsule into inhaler and inhale 1 (one) time daily. Patient takes the handi mist     • [DISCONTINUED] calcium carbonate (OS-RADHA) 600 MG tablet Take 600 mg by mouth 2 (two) times a day with meals.     • [DISCONTINUED] Glucosamine-Chondroitin 500-250 MG capsule Take 3 capsules by mouth 2 (Two) Times a Day.     • [DISCONTINUED] predniSONE (DELTASONE) 20 MG tablet Take 3 tablets by mouth.     • [DISCONTINUED] rosuvastatin (CRESTOR) 20 MG  "tablet Take  by mouth Daily.  6     No current facility-administered medications on file prior to visit.       Social History     Socioeconomic History   • Marital status:    Tobacco Use   • Smoking status: Passive Smoke Exposure - Never Smoker   • Smokeless tobacco: Never Used   Vaping Use   • Vaping Use: Never used   Substance and Sexual Activity   • Alcohol use: No     Comment: CAFFEINE USE: Decaf only   • Drug use: No   • Sexual activity: Never           ROS    Procedures    ECG 12 Lead    Date/Time: 9/29/2022 11:51 AM  Performed by: Jennifer Brown MD  Authorized by: Jennifer Brown MD   Comparison: compared with previous ECG   Similar to previous ECG  Rhythm: sinus rhythm  Ectopy: unifocal PVCs  QRS axis: left  Other findings: poor R wave progression    Clinical impression: abnormal EKG                Objective:      Vitals:    09/29/22 1147   BP: 110/78   Pulse: 92   Height: 160 cm (63\")     Body mass index is 16.08 kg/m².    Vitals reviewed.   Constitutional:       General: Not in acute distress.     Appearance: Cachectic. Ill-appearing. Not diaphoretic.   Eyes:      General: No scleral icterus.     Conjunctiva/sclera: Conjunctivae normal.   HENT:      Head: Normocephalic and atraumatic.   Neck:      Thyroid: No thyromegaly.      Vascular: No carotid bruit or JVD.      Lymphadenopathy: No cervical adenopathy.   Pulmonary:      Effort: Pulmonary effort is normal. No respiratory distress.      Breath sounds: Decreased breath sounds present. No wheezing. No rhonchi. No rales.   Chest:      Chest wall: Not tender to palpatation.   Cardiovascular:      Normal rate. Regularly irregular rhythm.      Murmurs: There is a grade 3/6 high frequency blowing holosystolic murmur at the apex.      No gallop.   Pulses:     Intact distal pulses.   Edema:     Peripheral edema present.     Ankle: bilateral 3+ edema of the ankle.  Abdominal:      General: Bowel sounds are normal. There is no distension or " abdominal bruit.      Palpations: Abdomen is soft. There is no abdominal mass.      Tenderness: There is no abdominal tenderness.   Musculoskeletal:         General: No deformity.      Extremities: No clubbing present.     Cervical back: Neck supple. Skin:     General: Skin is warm and dry. There is no cyanosis.      Coloration: Skin is not pale.      Findings: No rash.   Neurological:      Mental Status: Alert and oriented to person, place, and time.      Cranial Nerves: No cranial nerve deficit.   Psychiatric:         Judgment: Judgment normal.         Lab Review:       Assessment:      Diagnosis Plan   1. Chronic bronchitis, unspecified chronic bronchitis type (HCC)     2. Bronchiectasis without complication (HCC)     3. Essential hypertension     4. Hypercholesterolemia     5. Nonrheumatic mitral valve regurgitation     6. Billowing mitral valve       1. Bronchiectasis with COPD and bronchospasm, follows with Dr. Hudson.  This is severe and worsening.  2. Adrenal insufficiency, on Medrol 4 mg daily for this, sees Dr. Szymanski.   3. Severe mitral insufficiency, likely  4. Chronic lower extremity lymphedema.  5. frequent PVCs in [facility], related to her lung disease as well as adrenal insufficiency and thyroid abnormalities.   6. Severe lower extremity pitting edema, on Lasix.  7. Severe cachexia.         Plan:       She has lost so much weight.  I think her weight is probably only about 75 pounds.  I am not sure her diltiazem or Lasix are helping her and may be causing side effects. I think overall her physical health is failing and I do not know that she can last 6 more months.  I have strongly advised her to make sure that she not be out this winter and that she be very careful to not get the flu or COVID.  I have asked her to try to eat and her family wants her to try protein drinks and exercise.  They think that she could recover from this.  She has end-stage lung disease and severe valvular insufficiency and  severe cachexia-her prognosis is poor.  Her daughters want her to try to get better.

## 2022-11-07 NOTE — TELEPHONE ENCOUNTER
Failed protocol    Next OV-12/01/22-RM  Last OV-09/29/22-RM    Plan:       She has lost so much weight.  I think her weight is probably only about 75 pounds.  I am not sure her diltiazem or Lasix are helping her and may be causing side effects. I think overall her physical health is failing and I do not know that she can last 6 more months.  I have strongly advised her to make sure that she not be out this winter and that she be very careful to not get the flu or COVID.  I have asked her to try to eat and her family wants her to try protein drinks and exercise.  They think that she could recover from this.  She has end-stage lung disease and severe valvular insufficiency and severe cachexia-her prognosis is poor.  Her daughters want her to try to get better.

## 2022-12-01 NOTE — PROGRESS NOTES
Date of Office Visit: 2022  Encounter Provider: Jennifer Brown MD  Place of Service: [facility]ARDIOLOG[facility]  Patient Name: Gely Carlisle  :1935      Patient ID:  Gely Carlisle is a 87 y.o. female is here for  followup for mitral insufficiency.        History of Present Illness       She has a history of mitral valve prolapse and mitral regurgitation. She had bronchiectasis with chronic lung infections.  She also has asthma and COPD.  She has adrenal insufficiency due to chronic steroid use.  She has diabetes mellitus type 2, gout, thyroid disease, history of cervical and skin cancer.  She has hyperlipidemia.  She had vascular screening done 2017 which showed normal abdominal aorta, normal carotid arteries.  ABIs could not be obtained     She is , has 3 children retired.  She uses no cigarettes, alcohol or drugs.  She has 2 cups of coffee per day.  Her daughter has hypertension and her mother had heart disease.     She follows with Dr. Hudson for her bronchiectasis and history of Pseudomonas infections.  She also COPD. She follows with Dr. Szymanski for for her thyroid and adrenal insufficiency.  She takes Medrol 4 mg daily for her adrenal insufficiency.      Echo done 2020 shows mild to moderate left ventricular hypertrophy with mild to moderate mitral insufficiency, calcification of the aortic valve, ejection fraction 67% with grade 1 diastolic dysfunction, normal chamber sizes.  She had all 48-hour Holter monitor done 2020 which showed frequent PVCs occurring 17% PVCs, no VT.      She is very frail.  She was very short winded in March and 2021 and thought she might not make it.  She was placed on high-dose steroids and Cipro.  She finally pulled out of it but still feels quite weak.  She has frequent PVCs.  Echo done 2020 ejection fraction 61-65% with normal diastolic function, small left ventricular cavity size, mild mitral  stenosis, moderate eccentric mitral insufficiency, mild aortic insufficiency, mildly elevated RVSP.       She has severe cachexia.  Her appetite is still a problem.  She however has been decreasing her sugar because of history of issues with her blood sugar and I told her she should have no restrictions on that.  She still is in her own home and her daughters check on her.  She has no chest pain.  Her breathing has been stable.  She is very fatigued.  She has very little energy.  She cannot do any of her own activities of daily living.  Her mentation is clear and she can make her own decisions.    Past Medical History:   Diagnosis Date   • Asthma    • Bronchiectasis (HCC)    • Cancer (HCC)     cervical and skin   • Colitis    • COPD (chronic obstructive pulmonary disease) (HCC)    • Diabetes mellitus (HCC)     hyperglycemia   • Disease of thyroid gland     hypothyroidism   • Essential hypertension 8/24/2021   • History of transfusion    • Hyperlipidemia    • Insufficiency, adrenal (HCC)    • Leukocytosis    • Lymphedema    • MR (mitral regurgitation)    • MVP (mitral valve prolapse)    • Osteoarthritis    • Osteoporosis    • Scoliosis    • Urinary tract infection          Past Surgical History:   Procedure Laterality Date   • CATARACT EXTRACTION     • CHOLECYSTECTOMY      WITH COMMON BILE DUCT EXPLORATION REMOVAL OF STONE   • COLON SURGERY  1983    RUPTURED DIVERTICULI  DYLAN WORLEY MD   • HERNIA REPAIR  2012    ABDOMINAL LEFT UPPER   • HYSTERECTOMY     • HYSTERECTOMY     • JOINT REPLACEMENT Right     hip  2002 DR BROWN   • LUNG LOBECTOMY Left 1955    BRONCHIECTESIS LOWER LOBE   • TONSILLECTOMY      with adennoidectomy       Current Outpatient Medications on File Prior to Visit   Medication Sig Dispense Refill   • ADVAIR DISKUS 250-50 MCG/DOSE DISKUS INHALE 1 PUFF BID  11   • albuterol (PROVENTIL) (2.5 MG/3ML) 0.083% nebulizer solution Take 2.5 mg by nebulization every 4 (four) hours as needed for wheezing.     •  cetirizine (ZyrTEC) 10 MG tablet Take 10 mg by mouth Daily.     • CONTOUR NEXT TEST test strip TEST D  12   • Cranberry 1000 MG capsule Take 1 tablet by mouth Daily.     • Cyanocobalamin (VITAMIN B12 SL) Place 1 tablet under the tongue Daily.     • dilTIAZem (CARDIZEM) 30 MG tablet TAKE 1/2 TABLET BY MOUTH TWICE DAILY 90 tablet 0   • furosemide (LASIX) 20 MG tablet Take 1 tablet by mouth Every Morning. 30 tablet 11   • guaiFENesin (MUCINEX) 600 MG 12 hr tablet Take 1,200 mg by mouth 2 (two) times a day.     • HYDROcodone-acetaminophen (NORCO) 5-325 MG per tablet Take 1 tablet by mouth Every 4 (Four) Hours As Needed for Moderate Pain  or Severe Pain . 40 tablet 0   • levothyroxine (SYNTHROID, LEVOTHROID) 75 MCG tablet Take 75 mcg by mouth daily.     • methylPREDNISolone (MEDROL) 4 MG tablet Take 4 mg by mouth Daily.     • montelukast (SINGULAIR) 10 MG tablet Take 10 mg by mouth every night.     • multivitamin (THERAGRAN) tablet tablet Take  by mouth.     • risedronate (ACTONEL) 35 MG tablet Take 35 mg by mouth Every 7 (Seven) Days. with water on empty stomach, nothing by mouth or lie down for next 30 minutes. Takes on Monday.     • sodium chloride 7 % nebulizer solution nebulizer solution USE 1 VIAL PER NEBULIZER TWICE A DAY  3   • Probiotic Product (VSL#3 DS PO) Take 1 capsule by mouth Daily with lunch and dinner.     • tiotropium (SPIRIVA) 18 MCG per inhalation capsule Place 1 capsule into inhaler and inhale 1 (one) time daily. Patient takes the handi mist     • [DISCONTINUED] methylPREDNISolone (MEDROL) 4 MG tablet        No current facility-administered medications on file prior to visit.       Social History     Socioeconomic History   • Marital status:    Tobacco Use   • Smoking status: Never     Passive exposure: Yes   • Smokeless tobacco: Never   Vaping Use   • Vaping Use: Never used   Substance and Sexual Activity   • Alcohol use: No     Comment: CAFFEINE USE: Decaf only   • Drug use: No   • Sexual  "activity: Never           ROS    Procedures    ECG 12 Lead    Date/Time: 12/1/2022 12:54 PM  Performed by: Jennifer Brown MD  Authorized by: Jennifer Brown MD   Comparison: compared with previous ECG   Comparison to previous ECG: Now no pvcs  Rhythm: sinus rhythm  Q waves: V3 and V2    QRS axis: left    Clinical impression: abnormal EKG                Objective:      Vitals:    12/01/22 1210   BP: 142/60   BP Location: Right arm   Pulse: 70   Weight: 31.3 kg (69 lb)   Height: 160 cm (63\")     Body mass index is 12.22 kg/m².    Vitals reviewed.   Constitutional:       General: Not in acute distress.     Appearance: Cachectic. Not diaphoretic.   Eyes:      General: No scleral icterus.     Conjunctiva/sclera: Conjunctivae normal.   HENT:      Head: Normocephalic and atraumatic.   Neck:      Thyroid: No thyromegaly.      Vascular: No carotid bruit or JVD.      Lymphadenopathy: No cervical adenopathy.   Pulmonary:      Effort: Pulmonary effort is normal. No respiratory distress.      Breath sounds: Normal breath sounds. No wheezing. No rhonchi. No rales.   Chest:      Chest wall: Not tender to palpatation.   Cardiovascular:      Normal rate. Regular rhythm.      Murmurs: There is a grade 3/6 high frequency blowing holosystolic murmur at the apex.      No gallop.   Pulses:     Intact distal pulses.   Edema:     Peripheral edema present.     Pretibial: bilateral 2+ edema of the pretibial area.     Ankle: bilateral 2+ edema of the ankle.  Abdominal:      General: Bowel sounds are normal. There is no distension or abdominal bruit.      Palpations: Abdomen is soft. There is no abdominal mass.      Tenderness: There is no abdominal tenderness.   Musculoskeletal:         General: No deformity.      Extremities: No clubbing present.     Cervical back: Neck supple. Skin:     General: Skin is warm and dry. There is no cyanosis.      Coloration: Skin is not pale.      Findings: No rash.   Neurological:      Mental " Status: Alert and oriented to person, place, and time.      Cranial Nerves: No cranial nerve deficit.   Psychiatric:         Judgment: Judgment normal.         Lab Review:       Assessment:      Diagnosis Plan   1. Nonrheumatic mitral valve regurgitation        2. Essential hypertension        3. Hypercholesterolemia          1. Bronchiectasis with COPD and bronchospasm, follows with Dr. Hudson.  This is severe and worsening.  2. Adrenal insufficiency, on Medrol 4 mg daily for this, sees Dr. Szymanski.   3. Severe mitral insufficiency, likely  4. Chronic lower extremity lymphedema.  5. frequent PVCs in [place], related to her lung disease as well as adrenal insufficiency and thyroid abnormalities.   6. Severe lower extremity pitting edema, on Lasix.  7. Severe cachexia.       Plan:       Maintain current medications, advised her to increase her caloric intake, see back in April 2023.  Her overall prognosis is poor.

## 2023-01-01 ENCOUNTER — TRANSCRIBE ORDERS (OUTPATIENT)
Dept: ADMINISTRATIVE | Facility: HOSPITAL | Age: 88
End: 2023-01-01
Payer: MEDICARE

## 2023-01-01 ENCOUNTER — TELEPHONE (OUTPATIENT)
Dept: CARDIOLOGY | Facility: CLINIC | Age: 88
End: 2023-01-01
Payer: MEDICARE

## 2023-01-01 ENCOUNTER — HOSPITAL ENCOUNTER (EMERGENCY)
Facility: HOSPITAL | Age: 88
End: 2023-02-13
Attending: EMERGENCY MEDICINE | Admitting: EMERGENCY MEDICINE
Payer: MEDICARE

## 2023-01-01 VITALS — SYSTOLIC BLOOD PRESSURE: 103 MMHG | DIASTOLIC BLOOD PRESSURE: 57 MMHG | RESPIRATION RATE: 18 BRPM | HEART RATE: 73 BPM

## 2023-01-01 DIAGNOSIS — I46.9 CARDIAC ARREST: Primary | ICD-10-CM

## 2023-01-01 DIAGNOSIS — E43 SEVERE MALNUTRITION: ICD-10-CM

## 2023-01-01 DIAGNOSIS — R13.10 DYSPHAGIA, UNSPECIFIED TYPE: ICD-10-CM

## 2023-01-01 DIAGNOSIS — R05.9 COUGH, UNSPECIFIED TYPE: Primary | ICD-10-CM

## 2023-01-01 PROCEDURE — 99284 EMERGENCY DEPT VISIT MOD MDM: CPT

## 2023-01-01 PROCEDURE — 94799 UNLISTED PULMONARY SVC/PX: CPT

## 2023-01-01 PROCEDURE — 92950 HEART/LUNG RESUSCITATION CPR: CPT

## 2023-01-01 PROCEDURE — 25010000002 EPINEPHRINE 1 MG/10ML SOLUTION PREFILLED SYRINGE: Performed by: EMERGENCY MEDICINE

## 2023-01-01 RX ORDER — EPINEPHRINE 0.1 MG/ML
SYRINGE (ML) INJECTION
Status: COMPLETED | OUTPATIENT
Start: 2023-01-01 | End: 2023-01-01

## 2023-01-01 RX ADMIN — EPINEPHRINE 1 MG: 0.1 INJECTION INTRACARDIAC; INTRAVENOUS at 22:41

## 2023-01-01 RX ADMIN — EPINEPHRINE 1 MG: 0.1 INJECTION INTRACARDIAC; INTRAVENOUS at 22:20

## 2023-01-01 RX ADMIN — EPINEPHRINE 1 MG: 0.1 INJECTION INTRACARDIAC; INTRAVENOUS at 22:17

## 2023-01-01 RX ADMIN — EPINEPHRINE 1 MG: 0.1 INJECTION INTRACARDIAC; INTRAVENOUS at 22:38

## 2023-01-01 RX ADMIN — SODIUM BICARBONATE 50 MEQ: 84 INJECTION, SOLUTION INTRAVENOUS at 22:20

## 2023-01-30 NOTE — TELEPHONE ENCOUNTER
Caller: Sara Pearl    Relationship: Emergency Contact    Best call back number:[phone]    Requested Prescriptions:   Requested Prescriptions     Pending Prescriptions Disp Refills   • dilTIAZem (CARDIZEM) 30 MG tablet 90 tablet 0     Sig: Take 0.5 tablets by mouth 2 (Two) Times a Day.        [facility] where [facility] sent[facility]      Additional details provided by patient: PT SPILLED WATER IN MEDICATION AND NEEDS REFILL. SHE IS COMPLETELY OUT OF MEDICATION    Does the patient have less than a 3 day supply:  [x] Yes  [] No    Would you like a call back once the refill request has been completed: [] Yes [x] No    If the office needs to give you a call back, can they leave a voicemail: [x] Yes [] No    Carlton Aguilera Rep   01/30/23 09:52 EST

## 2023-02-13 NOTE — ED PROVIDER NOTES
EMERGENCY DEPARTMENT ENCOUNTER    Room Number:  17/17  Date seen:  2/12/2023  PCP: System, Provider Not In  Historian: Paramedics, family      HPI:  Chief Complaint: Cardiac arrest  A complete HPI/ROS/PMH/PSH/SH/FH are unobtainable due to: Patient's critical condition  Context: Gely Carlisle is a 87 y.o. female who presents to the ED via EMS from home after she was found down in cardiac arrest.  Family members report that patient had been complaining of some shortness of breath in recent days.  They had reportedly seen her about 5 minutes prior to the time she was found unresponsive.  Paramedics report that patient had approximately 10-minute period of downtime without CPR.  When they arrived, they found the patient in PEA.  They placed an i-gel in the airway and began bag-valve-mask ventilations along with chest compressions.  They administered multiple rounds of epinephrine.  They had brief return of spontaneous circulation in route but on arrival here, she had no pulses once again.  Paramedics tell me she did not have any shockable rhythms.  Remainder of history is quite limited because of her condition on arrival.      PAST MEDICAL HISTORY  Active Ambulatory Problems     Diagnosis Date Noted   • Billowing mitral valve 04/13/2016   • Hypokalemia 07/30/2016   • Bronchiectasis - on chronic steroid therapy 07/30/2016   • Chronic adrenal insufficiency (HCC) 07/30/2016   • COPD (chronic obstructive pulmonary disease) ([facility]) 07/30/2016   • Lymphedema 07/30/2016   • Osteoporosis 07/30/2016   • Recurrent UTI 08/10/2016   • Knee effusion, left 01/02/2017   • Pseudogout of left knee 01/03/2017   • Leukocytosis 01/03/2017   • Edema of lower extremity 10/07/2015   • Acquired hypothyroidism 08/16/2017   • Uncomplicated asthma 08/16/2017   • Hypercholesterolemia 08/04/2016   • Type 2 diabetes mellitus without complication, without long-term current use of insulin ([facility]) 06/29/2017   • Dyspnea 04/13/2016   • Mitral valve  regurgitation 04/13/2016   • PVC (premature ventricular contraction) 01/14/2021   • Essential hypertension 08/24/2021   • Vitamin D deficiency 08/24/2021   • Glucocorticoid deficiency (HCC) 08/24/2021   • Hyperlipidemia 08/24/2021     Resolved Ambulatory Problems     Diagnosis Date Noted   • MI (mitral incompetence) 04/13/2016   • Breath shortness 04/13/2016   • Cellulitis of right lower extremity with possible infected hematoma 07/30/2016   • Enterococcus UTI 07/30/2016   • Type 2 diabetes mellitus, uncontrolled 07/30/2016   • Asthma, currently active 08/04/2016   • Bronchiectasis (HCC) 06/24/2021     Past Medical History:   Diagnosis Date   • Asthma    • Cancer (HCC)    • Colitis    • Diabetes mellitus (HCC)    • Disease of thyroid gland    • History of transfusion    • Insufficiency, adrenal (HCC)    • MR (mitral regurgitation)    • MVP (mitral valve prolapse)    • Osteoarthritis    • Scoliosis    • Urinary tract infection          PAST SURGICAL HISTORY  Past Surgical History:   Procedure Laterality Date   • CATARACT EXTRACTION     • CHOLECYSTECTOMY      WITH COMMON BILE DUCT EXPLORATION REMOVAL OF STONE   • COLON SURGERY  1983    RUPTURED DIVERTICULI  DYLAN WORLEY MD   • HERNIA REPAIR  2012    ABDOMINAL LEFT UPPER   • HYSTERECTOMY     • HYSTERECTOMY     • JOINT REPLACEMENT Right     hip  2002 DR BROWN   • LUNG LOBECTOMY Left 1955    BRONCHIECTESIS LOWER LOBE   • TONSILLECTOMY      with adennoidectomy         FAMILY HISTORY  Family History   Problem Relation Age of Onset   • Heart disease Mother    • Heart attack Mother    • Cancer Sister         BREAST   • No Known Problems Father    • Heart attack Maternal Grandmother    • Heart disease Maternal Grandmother    • Stroke Maternal Grandfather    • No Known Problems Paternal Grandmother    • No Known Problems Paternal Grandfather          SOCIAL HISTORY  Social History     Socioeconomic History   • Marital status:    Tobacco Use   • Smoking status: Never      Passive exposure: Yes   • Smokeless tobacco: Never   Vaping Use   • Vaping Use: Never used   Substance and Sexual Activity   • Alcohol use: No     Comment: CAFFEINE USE: Decaf only   • Drug use: No   • Sexual activity: Never         ALLERGIES  Codeine, Demerol [meperidine], Morphine and related, Aminophylline, Avelox [moxifloxacin hcl in nacl], Sulfa antibiotics, Benadryl allergy [diphenhydramine hcl (sleep)], Biaxin [clarithromycin], Latex, Levaquin [levofloxacin in d5w], Macrobid [nitrofurantoin monohyd macro], and Penicillins        REVIEW OF SYSTEMS  Review of Systems   Unable to perform ROS: Patient unresponsive       PHYSICAL EXAM  ED Triage Vitals [02/12/23 2228]   Temp Heart Rate Resp BP SpO2   -- 73 18 103/57 --      Temp src Heart Rate Source Patient Position BP Location FiO2 (%)   -- -- -- -- --       Physical Exam      GENERAL: Elderly, emaciated/cachectic habitus, unresponsive  HENT: nares patent, normocephalic, dry mucous membranes  EYES: no scleral icterus, pupils fixed and dilated  CV: No palpable pulses, no cardiac sounds auscultated  RESPIRATORY: No spontaneous respirations, ventilated breath sounds are diminished bilaterally, no rhonchi  ABDOMEN: soft, herniation noted in the right lower quadrant  MUSCULOSKELETAL: no deformity, left upper extremity is bandaged, edema noted to bilateral lower extremities  NEURO: GCS 3  SKIN: warm, dry    Vital signs and nursing notes reviewed.          LAB RESULTS  No results found for this or any previous visit (from the past 24 hour(s)).    Ordered the above labs and reviewed the results.        RADIOLOGY  No Radiology Exams Resulted Within Past 24 Hours    Ordered the above noted radiological studies. Reviewed by me in PACS.            PROCEDURES  Procedures        MEDICATIONS GIVEN IN ER  Medications   EPINEPHrine (ADRENALIN) injection (1 mg Intravenous Given 2/12/23 2220)   sodium bicarbonate injection 8.4% (50 mEq Intravenous Given 2/12/23 2220)    EPINEPHrine (ADRENALIN) injection (1 mg Intravenous Given 2/12/23 2241)                   MEDICAL DECISION MAKING, PROGRESS, and CONSULTS    All labs have been independently reviewed by me.  All radiology studies have been reviewed by me and I have also reviewed the radiology report.   EKG's independently viewed and interpreted by me.  Discussion below represents my analysis of pertinent findings related to patient's condition, differential diagnosis, treatment plan and final disposition.      Additional sources:  - Discussed/ obtained information from independent historians: Discussed with paramedics and also with patient's family when they arrived to the ED to obtain further history.    - External (non-ED) record review: I reviewed most recent cardiology office progress note from December 1, 2022 when patient had follow-up care for hypertension, hyperlipidemia and nonrheumatic mitral valve regurgitation.  Comment is made of her bronchiectasis and COPD as well as frequent PVCs related to her lung disease.    - Chronic or social conditions impacting care: Advanced age, multiple medical problems, resides at home with family members    - Shared decision making: I spoke honestly with the patient's family in the room during our continued and prolonged efforts to resuscitate this patient and advised that we withdraw care because of her poor prognosis.  Ultimately they agreed with that plan and requested that we stop CPR.      Orders placed during this visit:  No orders of the defined types were placed in this encounter.      Differential diagnosis:    Acute MI, acute on chronic respiratory failure, sepsis, pulmonary plus      Independent interpretation of labs, radiology studies, and discussions with consultants:  ED Course as of 02/12/23 2314   Sun Feb 12, 2023   2310 When patient arrived, she had no pulses and no spontaneous respirations.  We continued CPR and ACLS algorithm.  We established a peripheral IV in the  "right upper extremity.  We give a dose of bicarb in addition to epinephrine.  At initial pulse check she still had no pulses.  I had actually decided that we were going to withdraw care and the team was agreeing with me on that.  However, then, at subsequent pulse check, she surprisingly did have a palpable pulse.  Therefore we continue to provide ventilatory support while I went to go talk with the family in the quiet room. [ALLIE]   2311 I explained to the patient's family that based on her advanced age and multiple comorbidities, I thought that her prognosis was quite dire and that I very much expected her to go back into cardiac arrest despite the fact that she had returned spontaneous circulation for the moment.  I explained that with the paramedics and our team had done everything we can do to keep her alive to this point.  I confirmed with the family that they still wanted to have everything done, specifically full code, and patient's daughter explained that that is what the preferred, explaining that the patient is \"a fighter\" and she asked us not to underestimate her strength.  I explained that we will continue to support her as much as we are able. [ALLIE]   9992 Then as I was walking back toward her room, unfortunately, within minutes she went back into cardiac arrest and had no palpable pulses.  Rhythm was PEA.  Therefore we gave another dose of epinephrine and continued chest compressions for several more minutes.  We brought the patient's family back into the room.  They were able to observe our collective efforts to continue resuscitation.  After observing our CPR and ventilatory efforts for several minutes, the patient's family did quietly ask us to withdraw care explaining that \"if this returns then she would not want that.\"  So, according to the family's wishes, our team did indeed stop resuscitation efforts at that time.  Our attention was turned to the family and their needs in this unexpected time of " grief.  Pastoral support is being offered now.   has been notified. [ALLIE]      ED Course User Index  [ALLIE] Aime Anna MD             Patient was placed in face mask during triage.  Patient was wearing face mask throughout encounter.  I wore personal protective equipment throughout the encounter.  Hand hygiene was performed before and after patient encounter.     DIAGNOSIS  Final diagnoses:   Cardiac arrest (HCC)         DISPOSITION  Patient             Latest Documented Vital Signs:  As of 23:14 EST  BP- 103/57 HR- 73 Temp-   O2 sat-                --    Please note that portions of this were completed with a voice recognition program.       Note Disclaimer: At [facility], we believe that sharing information builds trust and better relationships. You are receiving this note because you are receiving care at [facility] or recently visited. It is possible you will see health information before a provider has talked with you about it. This kind of information can be easy to misunderstand. To help you fully understand what it means for your health, we urge you to discuss this note with your provider.           Aime Anna MD  [phone]